# Patient Record
Sex: MALE | Race: WHITE | NOT HISPANIC OR LATINO
[De-identification: names, ages, dates, MRNs, and addresses within clinical notes are randomized per-mention and may not be internally consistent; named-entity substitution may affect disease eponyms.]

---

## 2022-05-28 ENCOUNTER — TRANSCRIPTION ENCOUNTER (OUTPATIENT)
Age: 57
End: 2022-05-28

## 2022-05-29 ENCOUNTER — INPATIENT (INPATIENT)
Facility: HOSPITAL | Age: 57
LOS: 9 days | Discharge: NURSING, REHAB OR LONG-TERM CARE | DRG: 493 | End: 2022-06-08
Attending: SPECIALIST | Admitting: SPECIALIST
Payer: COMMERCIAL

## 2022-05-29 ENCOUNTER — TRANSCRIPTION ENCOUNTER (OUTPATIENT)
Age: 57
End: 2022-05-29

## 2022-05-29 VITALS
DIASTOLIC BLOOD PRESSURE: 92 MMHG | SYSTOLIC BLOOD PRESSURE: 155 MMHG | OXYGEN SATURATION: 97 % | RESPIRATION RATE: 24 BRPM | HEART RATE: 85 BPM

## 2022-05-29 DIAGNOSIS — T14.8XXA OTHER INJURY OF UNSPECIFIED BODY REGION, INITIAL ENCOUNTER: ICD-10-CM

## 2022-05-29 DIAGNOSIS — N13.5 CROSSING VESSEL AND STRICTURE OF URETER WITHOUT HYDRONEPHROSIS: Chronic | ICD-10-CM

## 2022-05-29 DIAGNOSIS — Z98.89 OTHER SPECIFIED POSTPROCEDURAL STATES: Chronic | ICD-10-CM

## 2022-05-29 DIAGNOSIS — H57.8 OTHER SPECIFIED DISORDERS OF EYE AND ADNEXA: Chronic | ICD-10-CM

## 2022-05-29 LAB
ALBUMIN SERPL ELPH-MCNC: 4.2 G/DL — SIGNIFICANT CHANGE UP (ref 3.3–5)
ALP SERPL-CCNC: 44 U/L — SIGNIFICANT CHANGE UP (ref 40–120)
ALT FLD-CCNC: 19 U/L — SIGNIFICANT CHANGE UP (ref 10–45)
ANION GAP SERPL CALC-SCNC: 17 MMOL/L — SIGNIFICANT CHANGE UP (ref 5–17)
APTT BLD: 21.4 SEC — LOW (ref 27.5–35.5)
AST SERPL-CCNC: 26 U/L — SIGNIFICANT CHANGE UP (ref 10–40)
BASE EXCESS BLDV CALC-SCNC: -2 MMOL/L — SIGNIFICANT CHANGE UP (ref -2–2)
BASOPHILS # BLD AUTO: 0.07 K/UL — SIGNIFICANT CHANGE UP (ref 0–0.2)
BASOPHILS NFR BLD AUTO: 0.5 % — SIGNIFICANT CHANGE UP (ref 0–2)
BILIRUB SERPL-MCNC: 0.5 MG/DL — SIGNIFICANT CHANGE UP (ref 0.2–1.2)
BLD GP AB SCN SERPL QL: NEGATIVE — SIGNIFICANT CHANGE UP
BUN SERPL-MCNC: 19 MG/DL — SIGNIFICANT CHANGE UP (ref 7–23)
CA-I SERPL-SCNC: 1.2 MMOL/L — SIGNIFICANT CHANGE UP (ref 1.15–1.33)
CALCIUM SERPL-MCNC: 8.9 MG/DL — SIGNIFICANT CHANGE UP (ref 8.4–10.5)
CHLORIDE BLDV-SCNC: 99 MMOL/L — SIGNIFICANT CHANGE UP (ref 96–108)
CHLORIDE SERPL-SCNC: 101 MMOL/L — SIGNIFICANT CHANGE UP (ref 96–108)
CO2 BLDV-SCNC: 24 MMOL/L — SIGNIFICANT CHANGE UP (ref 22–26)
CO2 SERPL-SCNC: 20 MMOL/L — LOW (ref 22–31)
CREAT SERPL-MCNC: 1.01 MG/DL — SIGNIFICANT CHANGE UP (ref 0.5–1.3)
EGFR: 86 ML/MIN/1.73M2 — SIGNIFICANT CHANGE UP
EOSINOPHIL # BLD AUTO: 0.15 K/UL — SIGNIFICANT CHANGE UP (ref 0–0.5)
EOSINOPHIL NFR BLD AUTO: 1.1 % — SIGNIFICANT CHANGE UP (ref 0–6)
GAS PNL BLDV: 132 MMOL/L — LOW (ref 136–145)
GAS PNL BLDV: SIGNIFICANT CHANGE UP
GAS PNL BLDV: SIGNIFICANT CHANGE UP
GLUCOSE BLDV-MCNC: 166 MG/DL — HIGH (ref 70–99)
GLUCOSE SERPL-MCNC: 164 MG/DL — HIGH (ref 70–99)
HCO3 BLDV-SCNC: 23 MMOL/L — SIGNIFICANT CHANGE UP (ref 22–29)
HCT VFR BLD CALC: 42.8 % — SIGNIFICANT CHANGE UP (ref 39–50)
HCT VFR BLDA CALC: 46 % — SIGNIFICANT CHANGE UP (ref 39–51)
HGB BLD CALC-MCNC: 15.2 G/DL — SIGNIFICANT CHANGE UP (ref 12.6–17.4)
HGB BLD-MCNC: 15 G/DL — SIGNIFICANT CHANGE UP (ref 13–17)
IMM GRANULOCYTES NFR BLD AUTO: 0.4 % — SIGNIFICANT CHANGE UP (ref 0–1.5)
INR BLD: 1.05 RATIO — SIGNIFICANT CHANGE UP (ref 0.88–1.16)
LACTATE BLDV-MCNC: 2.3 MMOL/L — HIGH (ref 0.7–2)
LIDOCAIN IGE QN: 48 U/L — SIGNIFICANT CHANGE UP (ref 7–60)
LYMPHOCYTES # BLD AUTO: 2.96 K/UL — SIGNIFICANT CHANGE UP (ref 1–3.3)
LYMPHOCYTES # BLD AUTO: 21.7 % — SIGNIFICANT CHANGE UP (ref 13–44)
MCHC RBC-ENTMCNC: 32.2 PG — SIGNIFICANT CHANGE UP (ref 27–34)
MCHC RBC-ENTMCNC: 35 GM/DL — SIGNIFICANT CHANGE UP (ref 32–36)
MCV RBC AUTO: 91.8 FL — SIGNIFICANT CHANGE UP (ref 80–100)
MONOCYTES # BLD AUTO: 0.86 K/UL — SIGNIFICANT CHANGE UP (ref 0–0.9)
MONOCYTES NFR BLD AUTO: 6.3 % — SIGNIFICANT CHANGE UP (ref 2–14)
NEUTROPHILS # BLD AUTO: 9.52 K/UL — HIGH (ref 1.8–7.4)
NEUTROPHILS NFR BLD AUTO: 70 % — SIGNIFICANT CHANGE UP (ref 43–77)
NRBC # BLD: 0 /100 WBCS — SIGNIFICANT CHANGE UP (ref 0–0)
PCO2 BLDV: 40 MMHG — LOW (ref 42–55)
PH BLDV: 7.37 — SIGNIFICANT CHANGE UP (ref 7.32–7.43)
PLATELET # BLD AUTO: 294 K/UL — SIGNIFICANT CHANGE UP (ref 150–400)
PO2 BLDV: 51 MMHG — HIGH (ref 25–45)
POTASSIUM BLDV-SCNC: 4.3 MMOL/L — SIGNIFICANT CHANGE UP (ref 3.5–5.1)
POTASSIUM SERPL-MCNC: 4.3 MMOL/L — SIGNIFICANT CHANGE UP (ref 3.5–5.3)
POTASSIUM SERPL-SCNC: 4.3 MMOL/L — SIGNIFICANT CHANGE UP (ref 3.5–5.3)
PROT SERPL-MCNC: 6.6 G/DL — SIGNIFICANT CHANGE UP (ref 6–8.3)
PROTHROM AB SERPL-ACNC: 12.2 SEC — SIGNIFICANT CHANGE UP (ref 10.5–13.4)
RBC # BLD: 4.66 M/UL — SIGNIFICANT CHANGE UP (ref 4.2–5.8)
RBC # FLD: 12.7 % — SIGNIFICANT CHANGE UP (ref 10.3–14.5)
RH IG SCN BLD-IMP: POSITIVE — SIGNIFICANT CHANGE UP
SAO2 % BLDV: 82.4 % — SIGNIFICANT CHANGE UP (ref 67–88)
SARS-COV-2 RNA SPEC QL NAA+PROBE: SIGNIFICANT CHANGE UP
SODIUM SERPL-SCNC: 138 MMOL/L — SIGNIFICANT CHANGE UP (ref 135–145)
WBC # BLD: 13.61 K/UL — HIGH (ref 3.8–10.5)
WBC # FLD AUTO: 13.61 K/UL — HIGH (ref 3.8–10.5)

## 2022-05-29 PROCEDURE — 73590 X-RAY EXAM OF LOWER LEG: CPT | Mod: 26,LT

## 2022-05-29 PROCEDURE — 99285 EMERGENCY DEPT VISIT HI MDM: CPT

## 2022-05-29 PROCEDURE — 73590 X-RAY EXAM OF LOWER LEG: CPT | Mod: 26,LT,77

## 2022-05-29 PROCEDURE — 11012 DEB SKIN BONE AT FX SITE: CPT | Mod: LT

## 2022-05-29 PROCEDURE — 73700 CT LOWER EXTREMITY W/O DYE: CPT | Mod: 26,RT

## 2022-05-29 PROCEDURE — 72170 X-RAY EXAM OF PELVIS: CPT | Mod: 26

## 2022-05-29 PROCEDURE — 76377 3D RENDER W/INTRP POSTPROCES: CPT | Mod: 26

## 2022-05-29 PROCEDURE — 20692 APPL MLTPLN UNI EXT FIXJ SYS: CPT | Mod: LT

## 2022-05-29 PROCEDURE — 73552 X-RAY EXAM OF FEMUR 2/>: CPT | Mod: 26,RT,76

## 2022-05-29 PROCEDURE — 12035 INTMD RPR S/A/T/EXT 12.6-20: CPT | Mod: 59,RT

## 2022-05-29 DEVICE — IMPLANTABLE DEVICE: Type: IMPLANTABLE DEVICE | Site: LEFT | Status: FUNCTIONAL

## 2022-05-29 RX ORDER — TETANUS TOXOID, REDUCED DIPHTHERIA TOXOID AND ACELLULAR PERTUSSIS VACCINE, ADSORBED 5; 2.5; 8; 8; 2.5 [IU]/.5ML; [IU]/.5ML; UG/.5ML; UG/.5ML; UG/.5ML
0.5 SUSPENSION INTRAMUSCULAR ONCE
Refills: 0 | Status: COMPLETED | OUTPATIENT
Start: 2022-05-29 | End: 2022-05-29

## 2022-05-29 RX ORDER — CEFAZOLIN SODIUM 1 G
2000 VIAL (EA) INJECTION EVERY 8 HOURS
Refills: 0 | Status: COMPLETED | OUTPATIENT
Start: 2022-05-29 | End: 2022-05-31

## 2022-05-29 RX ORDER — FENTANYL CITRATE 50 UG/ML
50 INJECTION INTRAVENOUS ONCE
Refills: 0 | Status: DISCONTINUED | OUTPATIENT
Start: 2022-05-29 | End: 2022-05-29

## 2022-05-29 RX ORDER — FINASTERIDE 5 MG/1
5 TABLET, FILM COATED ORAL DAILY
Refills: 0 | Status: DISCONTINUED | OUTPATIENT
Start: 2022-05-29 | End: 2022-06-04

## 2022-05-29 RX ORDER — HYDROMORPHONE HYDROCHLORIDE 2 MG/ML
0.5 INJECTION INTRAMUSCULAR; INTRAVENOUS; SUBCUTANEOUS
Refills: 0 | Status: DISCONTINUED | OUTPATIENT
Start: 2022-05-29 | End: 2022-05-29

## 2022-05-29 RX ORDER — POLYETHYLENE GLYCOL 3350 17 G/17G
17 POWDER, FOR SOLUTION ORAL DAILY
Refills: 0 | Status: DISCONTINUED | OUTPATIENT
Start: 2022-05-29 | End: 2022-06-04

## 2022-05-29 RX ORDER — CEFAZOLIN SODIUM 1 G
2000 VIAL (EA) INJECTION ONCE
Refills: 0 | Status: COMPLETED | OUTPATIENT
Start: 2022-05-29 | End: 2022-05-29

## 2022-05-29 RX ORDER — ONDANSETRON 8 MG/1
4 TABLET, FILM COATED ORAL ONCE
Refills: 0 | Status: DISCONTINUED | OUTPATIENT
Start: 2022-05-29 | End: 2022-05-29

## 2022-05-29 RX ORDER — SODIUM CHLORIDE 9 MG/ML
1000 INJECTION INTRAMUSCULAR; INTRAVENOUS; SUBCUTANEOUS
Refills: 0 | Status: COMPLETED | OUTPATIENT
Start: 2022-05-29 | End: 2022-05-30

## 2022-05-29 RX ORDER — MORPHINE SULFATE 50 MG/1
2 CAPSULE, EXTENDED RELEASE ORAL EVERY 4 HOURS
Refills: 0 | Status: DISCONTINUED | OUTPATIENT
Start: 2022-05-29 | End: 2022-05-31

## 2022-05-29 RX ORDER — FERROUS SULFATE 325(65) MG
325 TABLET ORAL ONCE
Refills: 0 | Status: DISCONTINUED | OUTPATIENT
Start: 2022-05-29 | End: 2022-06-08

## 2022-05-29 RX ORDER — OXYCODONE HYDROCHLORIDE 5 MG/1
5 TABLET ORAL EVERY 4 HOURS
Refills: 0 | Status: DISCONTINUED | OUTPATIENT
Start: 2022-05-29 | End: 2022-06-04

## 2022-05-29 RX ORDER — ACETAMINOPHEN 500 MG
975 TABLET ORAL EVERY 8 HOURS
Refills: 0 | Status: DISCONTINUED | OUTPATIENT
Start: 2022-05-29 | End: 2022-06-04

## 2022-05-29 RX ORDER — ONDANSETRON 8 MG/1
4 TABLET, FILM COATED ORAL EVERY 6 HOURS
Refills: 0 | Status: DISCONTINUED | OUTPATIENT
Start: 2022-05-29 | End: 2022-06-04

## 2022-05-29 RX ORDER — SODIUM CHLORIDE 9 MG/ML
250 INJECTION INTRAMUSCULAR; INTRAVENOUS; SUBCUTANEOUS ONCE
Refills: 0 | Status: COMPLETED | OUTPATIENT
Start: 2022-05-29 | End: 2022-05-29

## 2022-05-29 RX ORDER — LANOLIN ALCOHOL/MO/W.PET/CERES
3 CREAM (GRAM) TOPICAL AT BEDTIME
Refills: 0 | Status: DISCONTINUED | OUTPATIENT
Start: 2022-05-29 | End: 2022-06-04

## 2022-05-29 RX ORDER — HEPARIN SODIUM 5000 [USP'U]/ML
5000 INJECTION INTRAVENOUS; SUBCUTANEOUS EVERY 8 HOURS
Refills: 0 | Status: COMPLETED | OUTPATIENT
Start: 2022-05-29 | End: 2022-05-29

## 2022-05-29 RX ORDER — OXYCODONE HYDROCHLORIDE 5 MG/1
10 TABLET ORAL EVERY 4 HOURS
Refills: 0 | Status: DISCONTINUED | OUTPATIENT
Start: 2022-05-29 | End: 2022-06-04

## 2022-05-29 RX ORDER — SENNA PLUS 8.6 MG/1
2 TABLET ORAL AT BEDTIME
Refills: 0 | Status: DISCONTINUED | OUTPATIENT
Start: 2022-05-29 | End: 2022-06-04

## 2022-05-29 RX ORDER — MAGNESIUM HYDROXIDE 400 MG/1
30 TABLET, CHEWABLE ORAL DAILY
Refills: 0 | Status: DISCONTINUED | OUTPATIENT
Start: 2022-05-29 | End: 2022-06-04

## 2022-05-29 RX ADMIN — FENTANYL CITRATE 50 MICROGRAM(S): 50 INJECTION INTRAVENOUS at 11:57

## 2022-05-29 RX ADMIN — HEPARIN SODIUM 5000 UNIT(S): 5000 INJECTION INTRAVENOUS; SUBCUTANEOUS at 21:22

## 2022-05-29 RX ADMIN — Medication 975 MILLIGRAM(S): at 22:15

## 2022-05-29 RX ADMIN — SODIUM CHLORIDE 100 MILLILITER(S): 9 INJECTION INTRAMUSCULAR; INTRAVENOUS; SUBCUTANEOUS at 15:04

## 2022-05-29 RX ADMIN — Medication 100 MILLIGRAM(S): at 20:57

## 2022-05-29 RX ADMIN — FENTANYL CITRATE 50 MICROGRAM(S): 50 INJECTION INTRAVENOUS at 12:15

## 2022-05-29 RX ADMIN — FENTANYL CITRATE 50 MICROGRAM(S): 50 INJECTION INTRAVENOUS at 11:38

## 2022-05-29 RX ADMIN — Medication 2000 MILLIGRAM(S): at 11:28

## 2022-05-29 RX ADMIN — SODIUM CHLORIDE 250 MILLILITER(S): 9 INJECTION INTRAMUSCULAR; INTRAVENOUS; SUBCUTANEOUS at 11:09

## 2022-05-29 RX ADMIN — SENNA PLUS 2 TABLET(S): 8.6 TABLET ORAL at 21:22

## 2022-05-29 RX ADMIN — OXYCODONE HYDROCHLORIDE 5 MILLIGRAM(S): 5 TABLET ORAL at 21:55

## 2022-05-29 RX ADMIN — TETANUS TOXOID, REDUCED DIPHTHERIA TOXOID AND ACELLULAR PERTUSSIS VACCINE, ADSORBED 0.5 MILLILITER(S): 5; 2.5; 8; 8; 2.5 SUSPENSION INTRAMUSCULAR at 11:39

## 2022-05-29 RX ADMIN — OXYCODONE HYDROCHLORIDE 5 MILLIGRAM(S): 5 TABLET ORAL at 21:22

## 2022-05-29 RX ADMIN — SODIUM CHLORIDE 250 MILLILITER(S): 9 INJECTION INTRAMUSCULAR; INTRAVENOUS; SUBCUTANEOUS at 11:28

## 2022-05-29 RX ADMIN — Medication 975 MILLIGRAM(S): at 21:22

## 2022-05-29 RX ADMIN — FENTANYL CITRATE 50 MICROGRAM(S): 50 INJECTION INTRAVENOUS at 11:06

## 2022-05-29 RX ADMIN — Medication 100 MILLIGRAM(S): at 11:10

## 2022-05-29 RX ADMIN — FENTANYL CITRATE 50 MICROGRAM(S): 50 INJECTION INTRAVENOUS at 11:11
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
## 2022-05-29 NOTE — ED PROVIDER NOTE - NSICDXPASTSURGICALHX_GEN_ALL_CORE_FT
PAST SURGICAL HISTORY:  Cyst of eye Excision    S/P laparoscopic procedure s/p pyeloplasty- left, suprapubic catheter insertion & left stent insertion march 2014    UPJ (ureteropelvic junction) obstruction s/p nephrostomy, feb 2014

## 2022-05-29 NOTE — H&P ADULT - NSHPPHYSICALEXAM_GEN_ALL_CORE
PE   Left LE:  wound over the anterior proximal tibia with associated soft tissue swelling. g  Compartments soft; + TTP about tibia. No TTP to hip/ankle/foot   ROM limited 2/2 pain   Motor intact GS/TA/FHL/EHL  SILT L2-S1  DP/PT pulses 2+  Well perfused    Right LE:   small wound over the anterior proximal tibia with associated soft tissue swelling.  Compartments soft; minimal TTP about tibia. No TTP to hip/ankle/foot   PROM of knee with mild pain, stable knee  Motor intact GS/TA/FHL/EHL  SILT L2-S1  DP/PT pulses 2+  Well perfused    Imaging:  XR demonstrating displaced left proximal 1/3 tibia fracture.  Remainder of imaging is pending at this time PE   Gen: Mild distress from pain, alert and oriented  Resp: No distress, breathing well on RA  Left LE:  wound over the anterior proximal tibia with associated soft tissue swelling  Compartments soft; + TTP about tibia. No TTP to hip/ankle/foot   ROM limited 2/2 pain   Motor intact GS/TA/FHL/EHL  SILT L2-S1  DP/PT pulses 2+  Well perfused    Right LE:   small 3 cm wound over the anterior proximal tibia with associated soft tissue swelling and appearance of some gross contamination near wound  Compartments soft; minimal TTP about tibia. No TTP to hip/ankle/foot   PROM of knee with mild pain, stable knee  Motor intact GS/TA/FHL/EHL  SILT L2-S1  DP/PT pulses 2+  Well perfused    Secondary: No bony tenderness or pain with ROM in BL LE. No bony tenderness of clavicle or chest wall. Pelvis nontender and stable. No midline tenderness in cervical, thoracic, or lumbar spine.
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
## 2022-05-29 NOTE — ED ADULT TRIAGE NOTE - SOURCE OF INFORMATION
Patient initially escalated due to no response, however patient entered vitals prior to phone call. Vital signs and symptoms did not trigger a second escalation; therefore, no follow up call is needed at this time.    Reason for Disposition   Health Information question, no triage required and triager able to answer question    Additional Information   Negative: [1] Caller is not with the adult (patient) AND [2] reporting urgent symptoms   Negative: Lab result questions   Negative: Medication questions   Negative: Caller can't be reached by phone   Negative: Caller has already spoken to PCP or another triager   Negative: RN needs further essential information from caller in order to complete triage   Negative: Requesting regular office appointment   Negative: [1] Caller requesting NON-URGENT health information AND [2] PCP's office is the best resource    Protocols used: INFORMATION ONLY CALL - NO TRIAGE-A-       EMS
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
## 2022-05-29 NOTE — ED PROVIDER NOTE - NSICDXPASTMEDICALHX_GEN_ALL_CORE_FT
PAST MEDICAL HISTORY:  BPH (benign prostatic hyperplasia)     UPJ (ureteropelvic junction) obstruction

## 2022-05-29 NOTE — ED PROVIDER NOTE - PROGRESS NOTE DETAILS
Abbey Currie MD Attending Physician- spoke with xray. prelim xrays done at bedside but I cannot see in PACS. xray tech will attempt to send to PACS to view. ortho wanted CT then patient to go to xray Abbey Currie MD Attending Physician- spoke with xray. prelim xrays done at bedside (viewed by ortho) but I cannot see in PACS. xray tech will attempt to send to PACS to view. ortho wanted CT then patient to go to xray Abbey Currie MD Attending Physician- ortho to take to OR

## 2022-05-29 NOTE — H&P ADULT - NSHPLABSRESULTS_GEN_ALL_CORE
Imaging:  XR demonstrating displaced left proximal 1/3 tibia fracture.    CT re-demonstrates fracture of L proximal tibia and fibula. Shows no intra-articular air in bilateral knees

## 2022-05-29 NOTE — ED PROVIDER NOTE - PHYSICAL EXAMINATION
General: Well appearing, awake, alert, oriented. Resting in bed.  HEENT: PERRLA EOMI. No trauma/bruising noted to head or face. No tenderness to palpation to bony structures of face. No facial bones step-off palpated. No lozada sign, no raccoon eyes noted. No hemotympanum noted.   CV: Regular rate and rhythm, S1/S2, no murmurs/rubs/gallops noted on exam. No tenderness to palpation to chest wall. No crepitus, no bony step-off noted on exam of chest wall.   Lungs: Clear to ascultation bilaterally, no wheezes/crackles/rales noted on exam. Equal chest wall excursion noted.   Abdomen: Soft, non tender, non distended, no guarding or rebound. No CVA tenderness to palpation.   MSK: Intact ROM of upper and lower extremities bilaterally. No tenderness to palpation to extremities. Intact ROM of neck. No gross deformities noted to extremities. No C-spine or spinal bony tenderness to palpation. Pelvis stable. Neck supple.   Neuro: Awake, A+O x4, moving all extremities spontaneously. CN 2-12 grossly intact. No nystagmus noted. Strength and sensation grossly intact to all extremities. Ambulatory w/o assist, normal gait. Speech fluent, no slurred speech. Negative romberg. No pronator drift. Finger to nose normal. Rectal tone grossly intact.   Extremities: No swelling or edema noted to extremities. No calf tenderness to palpation.   Skin: No rash or bruising noted on exam. General: Well appearing, awake, alert, oriented. Resting in bed.  HEENT: PERRLA EOMI. No trauma/bruising noted to head or face. No tenderness to palpation to bony structures of face. No facial bones step-off palpated. No lozada sign, no raccoon eyes noted. No hemotympanum noted.   CV: Regular rate and rhythm, S1/S2, no murmurs/rubs/gallops noted on exam. No tenderness to palpation to chest wall. No crepitus, no bony step-off noted on exam of chest wall.   Lungs: Clear to ascultation bilaterally, no wheezes/crackles/rales noted on exam. Equal chest wall excursion noted.   Abdomen: Soft, non tender, non distended, no guarding or rebound. No CVA tenderness to palpation.   MSK: Intact ROM of upper and lower extremities bilaterally.. Intact ROM of neck.. No C-spine or spinal bony tenderness to palpation. Pelvis stable. Neck supple.   Neuro: Awake, A+O x4, moving all extremities spontaneously. CN 2-12 grossly intact. No nystagmus noted. Strength and sensation grossly intact to all extremities. Ambulatory w/o assist, normal gait. Speech fluent, no slurred speech. Negative romberg. No pronator drift. Finger to nose normal. Rectal tone grossly intact.   Skin: No rash or bruising noted on exam. General: Well appearing, awake, alert, oriented. Resting in bed.  HEENT: PERRLA EOMI. No trauma/bruising noted to head or face. No tenderness to palpation to bony structures of face. No facial bones step-off palpated. No lozada sign, no raccoon eyes noted. No hemotympanum noted.   CV: Regular rate and rhythm, S1/S2, no murmurs/rubs/gallops noted on exam. No tenderness to palpation to chest wall. No crepitus, no bony step-off noted on exam of chest wall.   Lungs: Clear to ascultation bilaterally, no wheezes/crackles/rales noted on exam. Equal chest wall excursion noted.   Abdomen: Soft, non tender, non distended, no guarding or rebound. No CVA tenderness to palpation.   MSK: open fx b/l over tibial plateau, abrasions to R knee, pulses intact b/l at level of fem/pop/DP/PT  Neuro: Awake, A+O x4, moving all extremities spontaneously. CN 2-12 grossly intact. No nystagmus noted. Strength and sensation grossly intact to all extremities. Ambulatory w/o assist, normal gait. Speech fluent, no slurred speech. Negative romberg. No pronator drift. Finger to nose normal. Rectal tone grossly intact.

## 2022-05-29 NOTE — ED PROVIDER NOTE - NS ED ROS FT
CONST: no fevers, no chills, no lightheadedness  HEENT: no vision change, no sore throat  CV: no chest pain, no palpitations  RESP: no cough, no shortness of breath  ABD: no abdominal pain, no nausea/vomiting, no diarrhea  : no dysuria, no hematuria  ENDO: no frequent urination, no unusual thirst  MSK: + b/l LE fractures, pain, no neck pain   NEURO: no headache, no focal weakness, no loss of sensation  SKIN:  no rash

## 2022-05-29 NOTE — ED PROVIDER NOTE - CHIEF COMPLAINT
LAST REFILL CALL OFFICE TO SCHEDULE A FGASTING APPOINTMENT WITH  THIS MONTH  
The patient is a 57y Male complaining of

## 2022-05-30 DIAGNOSIS — R52 PAIN, UNSPECIFIED: ICD-10-CM

## 2022-05-30 DIAGNOSIS — N40.0 BENIGN PROSTATIC HYPERPLASIA WITHOUT LOWER URINARY TRACT SYMPTOMS: ICD-10-CM

## 2022-05-30 DIAGNOSIS — S82.202A UNSPECIFIED FRACTURE OF SHAFT OF LEFT TIBIA, INITIAL ENCOUNTER FOR CLOSED FRACTURE: ICD-10-CM

## 2022-05-30 LAB
ANION GAP SERPL CALC-SCNC: 8 MMOL/L — SIGNIFICANT CHANGE UP (ref 5–17)
APTT BLD: 26.2 SEC — LOW (ref 27.5–35.5)
BUN SERPL-MCNC: 18 MG/DL — SIGNIFICANT CHANGE UP (ref 7–23)
CALCIUM SERPL-MCNC: 8.3 MG/DL — LOW (ref 8.4–10.5)
CHLORIDE SERPL-SCNC: 105 MMOL/L — SIGNIFICANT CHANGE UP (ref 96–108)
CO2 SERPL-SCNC: 24 MMOL/L — SIGNIFICANT CHANGE UP (ref 22–31)
CREAT SERPL-MCNC: 0.94 MG/DL — SIGNIFICANT CHANGE UP (ref 0.5–1.3)
EGFR: 94 ML/MIN/1.73M2 — SIGNIFICANT CHANGE UP
GLUCOSE SERPL-MCNC: 122 MG/DL — HIGH (ref 70–99)
HCT VFR BLD CALC: 33.4 % — LOW (ref 39–50)
HGB BLD-MCNC: 11.4 G/DL — LOW (ref 13–17)
INR BLD: 1.11 RATIO — SIGNIFICANT CHANGE UP (ref 0.88–1.16)
MCHC RBC-ENTMCNC: 31.7 PG — SIGNIFICANT CHANGE UP (ref 27–34)
MCHC RBC-ENTMCNC: 34.1 GM/DL — SIGNIFICANT CHANGE UP (ref 32–36)
MCV RBC AUTO: 92.8 FL — SIGNIFICANT CHANGE UP (ref 80–100)
NRBC # BLD: 0 /100 WBCS — SIGNIFICANT CHANGE UP (ref 0–0)
PLATELET # BLD AUTO: 242 K/UL — SIGNIFICANT CHANGE UP (ref 150–400)
POTASSIUM SERPL-MCNC: 4.2 MMOL/L — SIGNIFICANT CHANGE UP (ref 3.5–5.3)
POTASSIUM SERPL-SCNC: 4.2 MMOL/L — SIGNIFICANT CHANGE UP (ref 3.5–5.3)
PROTHROM AB SERPL-ACNC: 12.9 SEC — SIGNIFICANT CHANGE UP (ref 10.5–13.4)
RBC # BLD: 3.6 M/UL — LOW (ref 4.2–5.8)
RBC # FLD: 13 % — SIGNIFICANT CHANGE UP (ref 10.3–14.5)
SODIUM SERPL-SCNC: 137 MMOL/L — SIGNIFICANT CHANGE UP (ref 135–145)
WBC # BLD: 8.34 K/UL — SIGNIFICANT CHANGE UP (ref 3.8–10.5)
WBC # FLD AUTO: 8.34 K/UL — SIGNIFICANT CHANGE UP (ref 3.8–10.5)

## 2022-05-30 PROCEDURE — 99232 SBSQ HOSP IP/OBS MODERATE 35: CPT

## 2022-05-30 PROCEDURE — 71045 X-RAY EXAM CHEST 1 VIEW: CPT | Mod: 26

## 2022-05-30 PROCEDURE — 93010 ELECTROCARDIOGRAM REPORT: CPT

## 2022-05-30 RX ORDER — ENOXAPARIN SODIUM 100 MG/ML
40 INJECTION SUBCUTANEOUS EVERY 24 HOURS
Refills: 0 | Status: DISCONTINUED | OUTPATIENT
Start: 2022-05-30 | End: 2022-06-03

## 2022-05-30 RX ORDER — SODIUM CHLORIDE 9 MG/ML
500 INJECTION, SOLUTION INTRAVENOUS ONCE
Refills: 0 | Status: COMPLETED | OUTPATIENT
Start: 2022-05-30 | End: 2022-05-30

## 2022-05-30 RX ADMIN — Medication 100 MILLIGRAM(S): at 21:45

## 2022-05-30 RX ADMIN — Medication 100 MILLIGRAM(S): at 04:38

## 2022-05-30 RX ADMIN — OXYCODONE HYDROCHLORIDE 10 MILLIGRAM(S): 5 TABLET ORAL at 21:33

## 2022-05-30 RX ADMIN — Medication 975 MILLIGRAM(S): at 21:33

## 2022-05-30 RX ADMIN — OXYCODONE HYDROCHLORIDE 10 MILLIGRAM(S): 5 TABLET ORAL at 13:25

## 2022-05-30 RX ADMIN — OXYCODONE HYDROCHLORIDE 10 MILLIGRAM(S): 5 TABLET ORAL at 01:45

## 2022-05-30 RX ADMIN — ENOXAPARIN SODIUM 40 MILLIGRAM(S): 100 INJECTION SUBCUTANEOUS at 15:53

## 2022-05-30 RX ADMIN — Medication 100 MILLIGRAM(S): at 11:08

## 2022-05-30 RX ADMIN — OXYCODONE HYDROCHLORIDE 10 MILLIGRAM(S): 5 TABLET ORAL at 22:05

## 2022-05-30 RX ADMIN — OXYCODONE HYDROCHLORIDE 10 MILLIGRAM(S): 5 TABLET ORAL at 08:11

## 2022-05-30 RX ADMIN — OXYCODONE HYDROCHLORIDE 10 MILLIGRAM(S): 5 TABLET ORAL at 01:14

## 2022-05-30 RX ADMIN — Medication 975 MILLIGRAM(S): at 13:54

## 2022-05-30 RX ADMIN — OXYCODONE HYDROCHLORIDE 10 MILLIGRAM(S): 5 TABLET ORAL at 18:16

## 2022-05-30 RX ADMIN — Medication 975 MILLIGRAM(S): at 22:05

## 2022-05-30 RX ADMIN — Medication 975 MILLIGRAM(S): at 05:35

## 2022-05-30 RX ADMIN — SODIUM CHLORIDE 100 MILLILITER(S): 9 INJECTION INTRAMUSCULAR; INTRAVENOUS; SUBCUTANEOUS at 04:37

## 2022-05-30 RX ADMIN — OXYCODONE HYDROCHLORIDE 10 MILLIGRAM(S): 5 TABLET ORAL at 12:27

## 2022-05-30 RX ADMIN — OXYCODONE HYDROCHLORIDE 10 MILLIGRAM(S): 5 TABLET ORAL at 17:25

## 2022-05-30 RX ADMIN — SODIUM CHLORIDE 500 MILLILITER(S): 9 INJECTION, SOLUTION INTRAVENOUS at 05:34

## 2022-05-30 RX ADMIN — FINASTERIDE 5 MILLIGRAM(S): 5 TABLET, FILM COATED ORAL at 11:08

## 2022-05-30 RX ADMIN — OXYCODONE HYDROCHLORIDE 10 MILLIGRAM(S): 5 TABLET ORAL at 08:56

## 2022-05-30 RX ADMIN — Medication 975 MILLIGRAM(S): at 14:46

## 2022-05-30 RX ADMIN — Medication 975 MILLIGRAM(S): at 06:30
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
## 2022-05-30 NOTE — PHYSICAL THERAPY INITIAL EVALUATION ADULT - ACTIVE RANGE OF MOTION EXAMINATION, REHAB EVAL
left LE in exfix/bilateral upper extremity Active ROM was WFL (within functional limits)/bilateral  lower extremity Active ROM was WFL (within functional limits)

## 2022-05-30 NOTE — CHART NOTE - NSCHARTNOTEFT_GEN_A_CORE
Case discussed with Dr. Low. Plan is to pursue definitive management with removal of ex fix and ORIF 6/4/22. Diet order resumed.
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
## 2022-05-30 NOTE — OCCUPATIONAL THERAPY INITIAL EVALUATION ADULT - TRANSFER TRAINING, PT EVAL
Non family member GOAL: Patient will be independent with functional transfers with use of rolling walker  in 4 weeks

## 2022-05-30 NOTE — CONSULT NOTE ADULT - PROBLEM SELECTOR RECOMMENDATION 9
Patient currently in exfix  scheduled for further surgery 6/2  PO as tolerated  continue wound care  DVT and GI prophylaxis
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
## 2022-05-30 NOTE — OCCUPATIONAL THERAPY INITIAL EVALUATION ADULT - DIAGNOSIS, OT EVAL
Pt presents with pain, decreased ROM, strength, endurance, balance, and coordination, all impacting ability to perform ADLs and functional mobility.

## 2022-05-30 NOTE — OCCUPATIONAL THERAPY INITIAL EVALUATION ADULT - RANGE OF MOTION EXAMINATION, LOWER EXTREMITY
except L ankle/knee- not tested in ex-fix/bilateral LE Active ROM was WFL  (within functional limits)
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
## 2022-05-31 LAB
ANION GAP SERPL CALC-SCNC: 11 MMOL/L — SIGNIFICANT CHANGE UP (ref 5–17)
BUN SERPL-MCNC: 16 MG/DL — SIGNIFICANT CHANGE UP (ref 7–23)
CALCIUM SERPL-MCNC: 8.5 MG/DL — SIGNIFICANT CHANGE UP (ref 8.4–10.5)
CHLORIDE SERPL-SCNC: 105 MMOL/L — SIGNIFICANT CHANGE UP (ref 96–108)
CO2 SERPL-SCNC: 24 MMOL/L — SIGNIFICANT CHANGE UP (ref 22–31)
CREAT SERPL-MCNC: 0.98 MG/DL — SIGNIFICANT CHANGE UP (ref 0.5–1.3)
EGFR: 89 ML/MIN/1.73M2 — SIGNIFICANT CHANGE UP
GLUCOSE SERPL-MCNC: 93 MG/DL — SIGNIFICANT CHANGE UP (ref 70–99)
HCT VFR BLD CALC: 32.4 % — LOW (ref 39–50)
HGB BLD-MCNC: 10.9 G/DL — LOW (ref 13–17)
MCHC RBC-ENTMCNC: 32.1 PG — SIGNIFICANT CHANGE UP (ref 27–34)
MCHC RBC-ENTMCNC: 33.6 GM/DL — SIGNIFICANT CHANGE UP (ref 32–36)
MCV RBC AUTO: 95.3 FL — SIGNIFICANT CHANGE UP (ref 80–100)
NRBC # BLD: 0 /100 WBCS — SIGNIFICANT CHANGE UP (ref 0–0)
PLATELET # BLD AUTO: 234 K/UL — SIGNIFICANT CHANGE UP (ref 150–400)
POTASSIUM SERPL-MCNC: 4.1 MMOL/L — SIGNIFICANT CHANGE UP (ref 3.5–5.3)
POTASSIUM SERPL-SCNC: 4.1 MMOL/L — SIGNIFICANT CHANGE UP (ref 3.5–5.3)
RBC # BLD: 3.4 M/UL — LOW (ref 4.2–5.8)
RBC # FLD: 13.3 % — SIGNIFICANT CHANGE UP (ref 10.3–14.5)
SODIUM SERPL-SCNC: 140 MMOL/L — SIGNIFICANT CHANGE UP (ref 135–145)
WBC # BLD: 7.64 K/UL — SIGNIFICANT CHANGE UP (ref 3.8–10.5)
WBC # FLD AUTO: 7.64 K/UL — SIGNIFICANT CHANGE UP (ref 3.8–10.5)

## 2022-05-31 RX ORDER — DIAZEPAM 5 MG
5 TABLET ORAL ONCE
Refills: 0 | Status: DISCONTINUED | OUTPATIENT
Start: 2022-05-31 | End: 2022-05-31

## 2022-05-31 RX ORDER — ZOLPIDEM TARTRATE 10 MG/1
5 TABLET ORAL ONCE
Refills: 0 | Status: DISCONTINUED | OUTPATIENT
Start: 2022-05-31 | End: 2022-05-31

## 2022-05-31 RX ORDER — MORPHINE SULFATE 50 MG/1
2 CAPSULE, EXTENDED RELEASE ORAL EVERY 4 HOURS
Refills: 0 | Status: DISCONTINUED | OUTPATIENT
Start: 2022-05-31 | End: 2022-06-04

## 2022-05-31 RX ADMIN — MORPHINE SULFATE 2 MILLIGRAM(S): 50 CAPSULE, EXTENDED RELEASE ORAL at 11:50

## 2022-05-31 RX ADMIN — OXYCODONE HYDROCHLORIDE 10 MILLIGRAM(S): 5 TABLET ORAL at 17:59

## 2022-05-31 RX ADMIN — FINASTERIDE 5 MILLIGRAM(S): 5 TABLET, FILM COATED ORAL at 14:27

## 2022-05-31 RX ADMIN — MORPHINE SULFATE 2 MILLIGRAM(S): 50 CAPSULE, EXTENDED RELEASE ORAL at 00:55

## 2022-05-31 RX ADMIN — ENOXAPARIN SODIUM 40 MILLIGRAM(S): 100 INJECTION SUBCUTANEOUS at 18:00

## 2022-05-31 RX ADMIN — Medication 5 MILLIGRAM(S): at 12:07

## 2022-05-31 RX ADMIN — Medication 975 MILLIGRAM(S): at 14:25

## 2022-05-31 RX ADMIN — OXYCODONE HYDROCHLORIDE 10 MILLIGRAM(S): 5 TABLET ORAL at 14:25

## 2022-05-31 RX ADMIN — OXYCODONE HYDROCHLORIDE 10 MILLIGRAM(S): 5 TABLET ORAL at 18:55

## 2022-05-31 RX ADMIN — OXYCODONE HYDROCHLORIDE 10 MILLIGRAM(S): 5 TABLET ORAL at 13:26

## 2022-05-31 RX ADMIN — Medication 975 MILLIGRAM(S): at 22:03

## 2022-05-31 RX ADMIN — OXYCODONE HYDROCHLORIDE 10 MILLIGRAM(S): 5 TABLET ORAL at 05:35

## 2022-05-31 RX ADMIN — Medication 975 MILLIGRAM(S): at 13:26

## 2022-05-31 RX ADMIN — POLYETHYLENE GLYCOL 3350 17 GRAM(S): 17 POWDER, FOR SOLUTION ORAL at 14:26

## 2022-05-31 RX ADMIN — Medication 100 MILLIGRAM(S): at 05:36

## 2022-05-31 RX ADMIN — Medication 975 MILLIGRAM(S): at 06:05

## 2022-05-31 RX ADMIN — OXYCODONE HYDROCHLORIDE 10 MILLIGRAM(S): 5 TABLET ORAL at 22:03

## 2022-05-31 RX ADMIN — Medication 975 MILLIGRAM(S): at 22:30

## 2022-05-31 RX ADMIN — MORPHINE SULFATE 2 MILLIGRAM(S): 50 CAPSULE, EXTENDED RELEASE ORAL at 00:40

## 2022-05-31 RX ADMIN — MORPHINE SULFATE 2 MILLIGRAM(S): 50 CAPSULE, EXTENDED RELEASE ORAL at 12:20

## 2022-05-31 RX ADMIN — Medication 975 MILLIGRAM(S): at 05:36

## 2022-05-31 RX ADMIN — OXYCODONE HYDROCHLORIDE 10 MILLIGRAM(S): 5 TABLET ORAL at 22:30

## 2022-05-31 RX ADMIN — OXYCODONE HYDROCHLORIDE 10 MILLIGRAM(S): 5 TABLET ORAL at 06:05

## 2022-06-01 RX ADMIN — OXYCODONE HYDROCHLORIDE 10 MILLIGRAM(S): 5 TABLET ORAL at 13:59

## 2022-06-01 RX ADMIN — Medication 975 MILLIGRAM(S): at 14:30

## 2022-06-01 RX ADMIN — Medication 975 MILLIGRAM(S): at 06:02

## 2022-06-01 RX ADMIN — Medication 975 MILLIGRAM(S): at 13:23

## 2022-06-01 RX ADMIN — Medication 975 MILLIGRAM(S): at 06:00

## 2022-06-01 RX ADMIN — OXYCODONE HYDROCHLORIDE 10 MILLIGRAM(S): 5 TABLET ORAL at 22:45

## 2022-06-01 RX ADMIN — Medication 975 MILLIGRAM(S): at 21:45

## 2022-06-01 RX ADMIN — Medication 975 MILLIGRAM(S): at 22:45

## 2022-06-01 RX ADMIN — ENOXAPARIN SODIUM 40 MILLIGRAM(S): 100 INJECTION SUBCUTANEOUS at 15:04

## 2022-06-01 RX ADMIN — OXYCODONE HYDROCHLORIDE 10 MILLIGRAM(S): 5 TABLET ORAL at 12:49

## 2022-06-01 RX ADMIN — OXYCODONE HYDROCHLORIDE 10 MILLIGRAM(S): 5 TABLET ORAL at 21:45

## 2022-06-02 RX ADMIN — Medication 975 MILLIGRAM(S): at 04:16

## 2022-06-02 RX ADMIN — OXYCODONE HYDROCHLORIDE 10 MILLIGRAM(S): 5 TABLET ORAL at 21:55

## 2022-06-02 RX ADMIN — OXYCODONE HYDROCHLORIDE 10 MILLIGRAM(S): 5 TABLET ORAL at 14:15

## 2022-06-02 RX ADMIN — Medication 975 MILLIGRAM(S): at 14:08

## 2022-06-02 RX ADMIN — SENNA PLUS 2 TABLET(S): 8.6 TABLET ORAL at 21:57

## 2022-06-02 RX ADMIN — OXYCODONE HYDROCHLORIDE 10 MILLIGRAM(S): 5 TABLET ORAL at 05:17

## 2022-06-02 RX ADMIN — Medication 975 MILLIGRAM(S): at 21:55

## 2022-06-02 RX ADMIN — FINASTERIDE 5 MILLIGRAM(S): 5 TABLET, FILM COATED ORAL at 13:35

## 2022-06-02 RX ADMIN — Medication 975 MILLIGRAM(S): at 22:30

## 2022-06-02 RX ADMIN — Medication 975 MILLIGRAM(S): at 05:17

## 2022-06-02 RX ADMIN — OXYCODONE HYDROCHLORIDE 10 MILLIGRAM(S): 5 TABLET ORAL at 09:42

## 2022-06-02 RX ADMIN — POLYETHYLENE GLYCOL 3350 17 GRAM(S): 17 POWDER, FOR SOLUTION ORAL at 13:35

## 2022-06-02 RX ADMIN — OXYCODONE HYDROCHLORIDE 10 MILLIGRAM(S): 5 TABLET ORAL at 09:13

## 2022-06-02 RX ADMIN — OXYCODONE HYDROCHLORIDE 10 MILLIGRAM(S): 5 TABLET ORAL at 22:30

## 2022-06-02 RX ADMIN — ENOXAPARIN SODIUM 40 MILLIGRAM(S): 100 INJECTION SUBCUTANEOUS at 17:14

## 2022-06-02 RX ADMIN — OXYCODONE HYDROCHLORIDE 10 MILLIGRAM(S): 5 TABLET ORAL at 13:34

## 2022-06-02 RX ADMIN — OXYCODONE HYDROCHLORIDE 10 MILLIGRAM(S): 5 TABLET ORAL at 18:12

## 2022-06-02 RX ADMIN — OXYCODONE HYDROCHLORIDE 10 MILLIGRAM(S): 5 TABLET ORAL at 04:17

## 2022-06-02 RX ADMIN — OXYCODONE HYDROCHLORIDE 10 MILLIGRAM(S): 5 TABLET ORAL at 17:35

## 2022-06-02 NOTE — PROGRESS NOTE ADULT - NS ATTEND AMEND GEN_ALL_CORE FT
The edema is improving slowly.  The left ankle and foot dorsiflexion is significantly weaker than the right ankle and foot.

## 2022-06-03 ENCOUNTER — TRANSCRIPTION ENCOUNTER (OUTPATIENT)
Age: 57
End: 2022-06-03

## 2022-06-03 LAB — SARS-COV-2 RNA SPEC QL NAA+PROBE: SIGNIFICANT CHANGE UP

## 2022-06-03 RX ADMIN — ENOXAPARIN SODIUM 40 MILLIGRAM(S): 100 INJECTION SUBCUTANEOUS at 15:07

## 2022-06-03 RX ADMIN — SENNA PLUS 2 TABLET(S): 8.6 TABLET ORAL at 21:48

## 2022-06-03 RX ADMIN — Medication 975 MILLIGRAM(S): at 22:16

## 2022-06-03 RX ADMIN — Medication 975 MILLIGRAM(S): at 15:06

## 2022-06-03 RX ADMIN — Medication 975 MILLIGRAM(S): at 05:56

## 2022-06-03 RX ADMIN — Medication 975 MILLIGRAM(S): at 21:48

## 2022-06-03 RX ADMIN — OXYCODONE HYDROCHLORIDE 10 MILLIGRAM(S): 5 TABLET ORAL at 19:45

## 2022-06-03 RX ADMIN — OXYCODONE HYDROCHLORIDE 10 MILLIGRAM(S): 5 TABLET ORAL at 15:30

## 2022-06-03 RX ADMIN — OXYCODONE HYDROCHLORIDE 10 MILLIGRAM(S): 5 TABLET ORAL at 16:00

## 2022-06-03 RX ADMIN — Medication 975 MILLIGRAM(S): at 15:35

## 2022-06-03 RX ADMIN — Medication 975 MILLIGRAM(S): at 06:31

## 2022-06-03 RX ADMIN — OXYCODONE HYDROCHLORIDE 10 MILLIGRAM(S): 5 TABLET ORAL at 12:00

## 2022-06-03 RX ADMIN — FINASTERIDE 5 MILLIGRAM(S): 5 TABLET, FILM COATED ORAL at 11:31

## 2022-06-03 RX ADMIN — OXYCODONE HYDROCHLORIDE 10 MILLIGRAM(S): 5 TABLET ORAL at 20:52

## 2022-06-03 RX ADMIN — OXYCODONE HYDROCHLORIDE 10 MILLIGRAM(S): 5 TABLET ORAL at 04:45

## 2022-06-03 RX ADMIN — OXYCODONE HYDROCHLORIDE 10 MILLIGRAM(S): 5 TABLET ORAL at 11:30

## 2022-06-03 RX ADMIN — OXYCODONE HYDROCHLORIDE 10 MILLIGRAM(S): 5 TABLET ORAL at 04:12

## 2022-06-03 NOTE — PROGRESS NOTE ADULT - ATTENDING COMMENTS
The peroneal nerve function is not normal. He is unable to dorsiflex his left ankle and toes.  Awaiting improvement in edema before proceeding with ORIF.
seen and examined 05-30-22 @ 1130    afeb  AVSS  left knee spanning ex-fix in place    open left tibial plateau fracture  -5/29/2022 - debridement and repair of wound / external fixation    right leg wound  -5/29/2022 - debridement and repair of wound      transfer to ortho for further care
The patient has weakness in the peroneal nerve distribution
The patient was examined on 6/3/2022 at 5pm.His family was at bedside.  Left leg edema improving .No blisters.No sign of infection.  Left ankle :No active dorsiflexion. EHL 2/5.Sensation present in first webspace.

## 2022-06-04 LAB
ANION GAP SERPL CALC-SCNC: 11 MMOL/L — SIGNIFICANT CHANGE UP (ref 5–17)
ANION GAP SERPL CALC-SCNC: 9 MMOL/L — SIGNIFICANT CHANGE UP (ref 5–17)
APTT BLD: 30.5 SEC — SIGNIFICANT CHANGE UP (ref 27.5–35.5)
BLD GP AB SCN SERPL QL: NEGATIVE — SIGNIFICANT CHANGE UP
BUN SERPL-MCNC: 22 MG/DL — SIGNIFICANT CHANGE UP (ref 7–23)
BUN SERPL-MCNC: 23 MG/DL — SIGNIFICANT CHANGE UP (ref 7–23)
CALCIUM SERPL-MCNC: 8.9 MG/DL — SIGNIFICANT CHANGE UP (ref 8.4–10.5)
CALCIUM SERPL-MCNC: 9.5 MG/DL — SIGNIFICANT CHANGE UP (ref 8.4–10.5)
CHLORIDE SERPL-SCNC: 100 MMOL/L — SIGNIFICANT CHANGE UP (ref 96–108)
CHLORIDE SERPL-SCNC: 99 MMOL/L — SIGNIFICANT CHANGE UP (ref 96–108)
CO2 SERPL-SCNC: 27 MMOL/L — SIGNIFICANT CHANGE UP (ref 22–31)
CO2 SERPL-SCNC: 29 MMOL/L — SIGNIFICANT CHANGE UP (ref 22–31)
CREAT SERPL-MCNC: 0.92 MG/DL — SIGNIFICANT CHANGE UP (ref 0.5–1.3)
CREAT SERPL-MCNC: 1.07 MG/DL — SIGNIFICANT CHANGE UP (ref 0.5–1.3)
EGFR: 80 ML/MIN/1.73M2 — SIGNIFICANT CHANGE UP
EGFR: 96 ML/MIN/1.73M2 — SIGNIFICANT CHANGE UP
GLUCOSE SERPL-MCNC: 101 MG/DL — HIGH (ref 70–99)
GLUCOSE SERPL-MCNC: 179 MG/DL — HIGH (ref 70–99)
HCT VFR BLD CALC: 29.1 % — LOW (ref 39–50)
HCT VFR BLD CALC: 31.3 % — LOW (ref 39–50)
HGB BLD-MCNC: 10.3 G/DL — LOW (ref 13–17)
HGB BLD-MCNC: 9.9 G/DL — LOW (ref 13–17)
INR BLD: 1.09 RATIO — SIGNIFICANT CHANGE UP (ref 0.88–1.16)
MCHC RBC-ENTMCNC: 31 PG — SIGNIFICANT CHANGE UP (ref 27–34)
MCHC RBC-ENTMCNC: 31.9 PG — SIGNIFICANT CHANGE UP (ref 27–34)
MCHC RBC-ENTMCNC: 32.9 GM/DL — SIGNIFICANT CHANGE UP (ref 32–36)
MCHC RBC-ENTMCNC: 34 GM/DL — SIGNIFICANT CHANGE UP (ref 32–36)
MCV RBC AUTO: 93.9 FL — SIGNIFICANT CHANGE UP (ref 80–100)
MCV RBC AUTO: 94.3 FL — SIGNIFICANT CHANGE UP (ref 80–100)
NRBC # BLD: 0 /100 WBCS — SIGNIFICANT CHANGE UP (ref 0–0)
NRBC # BLD: 0 /100 WBCS — SIGNIFICANT CHANGE UP (ref 0–0)
PLATELET # BLD AUTO: 327 K/UL — SIGNIFICANT CHANGE UP (ref 150–400)
PLATELET # BLD AUTO: 361 K/UL — SIGNIFICANT CHANGE UP (ref 150–400)
POTASSIUM SERPL-MCNC: 4.3 MMOL/L — SIGNIFICANT CHANGE UP (ref 3.5–5.3)
POTASSIUM SERPL-MCNC: 4.5 MMOL/L — SIGNIFICANT CHANGE UP (ref 3.5–5.3)
POTASSIUM SERPL-SCNC: 4.3 MMOL/L — SIGNIFICANT CHANGE UP (ref 3.5–5.3)
POTASSIUM SERPL-SCNC: 4.5 MMOL/L — SIGNIFICANT CHANGE UP (ref 3.5–5.3)
PROTHROM AB SERPL-ACNC: 12.7 SEC — SIGNIFICANT CHANGE UP (ref 10.5–13.4)
RBC # BLD: 3.1 M/UL — LOW (ref 4.2–5.8)
RBC # BLD: 3.32 M/UL — LOW (ref 4.2–5.8)
RBC # FLD: 12.6 % — SIGNIFICANT CHANGE UP (ref 10.3–14.5)
RBC # FLD: 12.8 % — SIGNIFICANT CHANGE UP (ref 10.3–14.5)
RH IG SCN BLD-IMP: POSITIVE — SIGNIFICANT CHANGE UP
SODIUM SERPL-SCNC: 137 MMOL/L — SIGNIFICANT CHANGE UP (ref 135–145)
SODIUM SERPL-SCNC: 138 MMOL/L — SIGNIFICANT CHANGE UP (ref 135–145)
WBC # BLD: 10.3 K/UL — SIGNIFICANT CHANGE UP (ref 3.8–10.5)
WBC # BLD: 6.19 K/UL — SIGNIFICANT CHANGE UP (ref 3.8–10.5)
WBC # FLD AUTO: 10.3 K/UL — SIGNIFICANT CHANGE UP (ref 3.8–10.5)
WBC # FLD AUTO: 6.19 K/UL — SIGNIFICANT CHANGE UP (ref 3.8–10.5)

## 2022-06-04 PROCEDURE — 27536 TREAT KNEE FRACTURE: CPT | Mod: 58,LT

## 2022-06-04 PROCEDURE — 20694 RMVL EXT FIXJ SYS UNDER ANES: CPT | Mod: 58,LT

## 2022-06-04 PROCEDURE — 73590 X-RAY EXAM OF LOWER LEG: CPT | Mod: 26,LT

## 2022-06-04 PROCEDURE — 11011 DEBRIDE SKIN MUSC AT FX SITE: CPT | Mod: 58,LT

## 2022-06-04 DEVICE — IMPLANTABLE DEVICE: Type: IMPLANTABLE DEVICE | Site: LEFT | Status: FUNCTIONAL

## 2022-06-04 DEVICE — SCREW CORT S-T 3.5X55MM: Type: IMPLANTABLE DEVICE | Site: LEFT | Status: FUNCTIONAL

## 2022-06-04 DEVICE — SCREW CORT S-T 4.5X32MM: Type: IMPLANTABLE DEVICE | Site: LEFT | Status: FUNCTIONAL

## 2022-06-04 DEVICE — SCR CORT S-T 4.5X30MM: Type: IMPLANTABLE DEVICE | Site: LEFT | Status: FUNCTIONAL

## 2022-06-04 DEVICE — SCREW CORT S-T 4.5X28MM: Type: IMPLANTABLE DEVICE | Site: LEFT | Status: FUNCTIONAL

## 2022-06-04 DEVICE — SCREW CORT S-T 3.5X28MM: Type: IMPLANTABLE DEVICE | Site: LEFT | Status: FUNCTIONAL

## 2022-06-04 DEVICE — GWIRE DRILL TIP 2.5X200MM: Type: IMPLANTABLE DEVICE | Site: LEFT | Status: FUNCTIONAL

## 2022-06-04 DEVICE — SCREW CORT S-T 3.5X60MM: Type: IMPLANTABLE DEVICE | Site: LEFT | Status: FUNCTIONAL

## 2022-06-04 DEVICE — SCREW CORT S-T 3.5X24MM: Type: IMPLANTABLE DEVICE | Site: LEFT | Status: FUNCTIONAL

## 2022-06-04 DEVICE — SCREW CORT S-T 3.5X50MM: Type: IMPLANTABLE DEVICE | Site: LEFT | Status: FUNCTIONAL

## 2022-06-04 DEVICE — SCREW CORT S-T 3.5X32MM: Type: IMPLANTABLE DEVICE | Site: LEFT | Status: FUNCTIONAL

## 2022-06-04 DEVICE — SCREW LOKG SLF-TPNG W/ STARDRIVE RECESS 3.5X24MM: Type: IMPLANTABLE DEVICE | Site: LEFT | Status: FUNCTIONAL

## 2022-06-04 RX ORDER — OXYCODONE HYDROCHLORIDE 5 MG/1
5 TABLET ORAL
Refills: 0 | Status: DISCONTINUED | OUTPATIENT
Start: 2022-06-04 | End: 2022-06-08

## 2022-06-04 RX ORDER — HYDROMORPHONE HYDROCHLORIDE 2 MG/ML
1 INJECTION INTRAMUSCULAR; INTRAVENOUS; SUBCUTANEOUS EVERY 4 HOURS
Refills: 0 | Status: DISCONTINUED | OUTPATIENT
Start: 2022-06-04 | End: 2022-06-07

## 2022-06-04 RX ORDER — CEFAZOLIN SODIUM 1 G
2000 VIAL (EA) INJECTION EVERY 8 HOURS
Refills: 0 | Status: COMPLETED | OUTPATIENT
Start: 2022-06-04 | End: 2022-06-04

## 2022-06-04 RX ORDER — ACETAMINOPHEN 500 MG
975 TABLET ORAL EVERY 8 HOURS
Refills: 0 | Status: DISCONTINUED | OUTPATIENT
Start: 2022-06-04 | End: 2022-06-08

## 2022-06-04 RX ORDER — APIXABAN 2.5 MG/1
2.5 TABLET, FILM COATED ORAL EVERY 12 HOURS
Refills: 0 | Status: DISCONTINUED | OUTPATIENT
Start: 2022-06-04 | End: 2022-06-05

## 2022-06-04 RX ORDER — TRAMADOL HYDROCHLORIDE 50 MG/1
50 TABLET ORAL EVERY 6 HOURS
Refills: 0 | Status: DISCONTINUED | OUTPATIENT
Start: 2022-06-04 | End: 2022-06-08

## 2022-06-04 RX ORDER — ACETAMINOPHEN 500 MG
1000 TABLET ORAL ONCE
Refills: 0 | Status: COMPLETED | OUTPATIENT
Start: 2022-06-04 | End: 2022-06-04

## 2022-06-04 RX ORDER — ONDANSETRON 8 MG/1
4 TABLET, FILM COATED ORAL ONCE
Refills: 0 | Status: COMPLETED | OUTPATIENT
Start: 2022-06-04 | End: 2022-06-04

## 2022-06-04 RX ORDER — HYDROMORPHONE HYDROCHLORIDE 2 MG/ML
0.5 INJECTION INTRAMUSCULAR; INTRAVENOUS; SUBCUTANEOUS
Refills: 0 | Status: DISCONTINUED | OUTPATIENT
Start: 2022-06-04 | End: 2022-06-04

## 2022-06-04 RX ORDER — SENNA PLUS 8.6 MG/1
2 TABLET ORAL AT BEDTIME
Refills: 0 | Status: DISCONTINUED | OUTPATIENT
Start: 2022-06-04 | End: 2022-06-08

## 2022-06-04 RX ORDER — MAGNESIUM HYDROXIDE 400 MG/1
30 TABLET, CHEWABLE ORAL DAILY
Refills: 0 | Status: DISCONTINUED | OUTPATIENT
Start: 2022-06-04 | End: 2022-06-08

## 2022-06-04 RX ORDER — OXYCODONE HYDROCHLORIDE 5 MG/1
10 TABLET ORAL
Refills: 0 | Status: DISCONTINUED | OUTPATIENT
Start: 2022-06-04 | End: 2022-06-08

## 2022-06-04 RX ORDER — KETOROLAC TROMETHAMINE 30 MG/ML
30 SYRINGE (ML) INJECTION EVERY 6 HOURS
Refills: 0 | Status: DISCONTINUED | OUTPATIENT
Start: 2022-06-04 | End: 2022-06-07

## 2022-06-04 RX ORDER — SODIUM CHLORIDE 9 MG/ML
1000 INJECTION INTRAMUSCULAR; INTRAVENOUS; SUBCUTANEOUS
Refills: 0 | Status: DISCONTINUED | OUTPATIENT
Start: 2022-06-04 | End: 2022-06-08

## 2022-06-04 RX ORDER — POLYETHYLENE GLYCOL 3350 17 G/17G
17 POWDER, FOR SOLUTION ORAL DAILY
Refills: 0 | Status: DISCONTINUED | OUTPATIENT
Start: 2022-06-04 | End: 2022-06-08

## 2022-06-04 RX ADMIN — OXYCODONE HYDROCHLORIDE 10 MILLIGRAM(S): 5 TABLET ORAL at 00:40

## 2022-06-04 RX ADMIN — Medication 30 MILLIGRAM(S): at 17:46

## 2022-06-04 RX ADMIN — SODIUM CHLORIDE 125 MILLILITER(S): 9 INJECTION INTRAMUSCULAR; INTRAVENOUS; SUBCUTANEOUS at 15:29

## 2022-06-04 RX ADMIN — HYDROMORPHONE HYDROCHLORIDE 0.5 MILLIGRAM(S): 2 INJECTION INTRAMUSCULAR; INTRAVENOUS; SUBCUTANEOUS at 12:45

## 2022-06-04 RX ADMIN — Medication 975 MILLIGRAM(S): at 06:50

## 2022-06-04 RX ADMIN — OXYCODONE HYDROCHLORIDE 10 MILLIGRAM(S): 5 TABLET ORAL at 18:16

## 2022-06-04 RX ADMIN — Medication 30 MILLIGRAM(S): at 23:49

## 2022-06-04 RX ADMIN — OXYCODONE HYDROCHLORIDE 10 MILLIGRAM(S): 5 TABLET ORAL at 04:40

## 2022-06-04 RX ADMIN — OXYCODONE HYDROCHLORIDE 10 MILLIGRAM(S): 5 TABLET ORAL at 04:12

## 2022-06-04 RX ADMIN — OXYCODONE HYDROCHLORIDE 10 MILLIGRAM(S): 5 TABLET ORAL at 00:06

## 2022-06-04 RX ADMIN — SENNA PLUS 2 TABLET(S): 8.6 TABLET ORAL at 21:32

## 2022-06-04 RX ADMIN — OXYCODONE HYDROCHLORIDE 10 MILLIGRAM(S): 5 TABLET ORAL at 13:15

## 2022-06-04 RX ADMIN — OXYCODONE HYDROCHLORIDE 10 MILLIGRAM(S): 5 TABLET ORAL at 22:31

## 2022-06-04 RX ADMIN — SODIUM CHLORIDE 100 MILLILITER(S): 9 INJECTION INTRAMUSCULAR; INTRAVENOUS; SUBCUTANEOUS at 12:16

## 2022-06-04 RX ADMIN — HYDROMORPHONE HYDROCHLORIDE 0.5 MILLIGRAM(S): 2 INJECTION INTRAMUSCULAR; INTRAVENOUS; SUBCUTANEOUS at 13:00

## 2022-06-04 RX ADMIN — HYDROMORPHONE HYDROCHLORIDE 0.5 MILLIGRAM(S): 2 INJECTION INTRAMUSCULAR; INTRAVENOUS; SUBCUTANEOUS at 12:30

## 2022-06-04 RX ADMIN — ONDANSETRON 4 MILLIGRAM(S): 8 TABLET, FILM COATED ORAL at 12:41

## 2022-06-04 RX ADMIN — POLYETHYLENE GLYCOL 3350 17 GRAM(S): 17 POWDER, FOR SOLUTION ORAL at 21:32

## 2022-06-04 RX ADMIN — HYDROMORPHONE HYDROCHLORIDE 1 MILLIGRAM(S): 2 INJECTION INTRAMUSCULAR; INTRAVENOUS; SUBCUTANEOUS at 16:27

## 2022-06-04 RX ADMIN — Medication 100 MILLIGRAM(S): at 23:49

## 2022-06-04 RX ADMIN — Medication 400 MILLIGRAM(S): at 14:00

## 2022-06-04 RX ADMIN — HYDROMORPHONE HYDROCHLORIDE 1 MILLIGRAM(S): 2 INJECTION INTRAMUSCULAR; INTRAVENOUS; SUBCUTANEOUS at 16:45

## 2022-06-04 RX ADMIN — Medication 100 MILLIGRAM(S): at 15:28

## 2022-06-04 RX ADMIN — Medication 975 MILLIGRAM(S): at 21:31

## 2022-06-04 RX ADMIN — Medication 975 MILLIGRAM(S): at 06:19

## 2022-06-04 RX ADMIN — HYDROMORPHONE HYDROCHLORIDE 0.5 MILLIGRAM(S): 2 INJECTION INTRAMUSCULAR; INTRAVENOUS; SUBCUTANEOUS at 12:15

## 2022-06-04 RX ADMIN — Medication 975 MILLIGRAM(S): at 22:31

## 2022-06-04 RX ADMIN — MORPHINE SULFATE 2 MILLIGRAM(S): 50 CAPSULE, EXTENDED RELEASE ORAL at 06:35

## 2022-06-04 RX ADMIN — MORPHINE SULFATE 2 MILLIGRAM(S): 50 CAPSULE, EXTENDED RELEASE ORAL at 06:20

## 2022-06-04 RX ADMIN — HYDROMORPHONE HYDROCHLORIDE 1 MILLIGRAM(S): 2 INJECTION INTRAMUSCULAR; INTRAVENOUS; SUBCUTANEOUS at 23:48

## 2022-06-04 RX ADMIN — OXYCODONE HYDROCHLORIDE 10 MILLIGRAM(S): 5 TABLET ORAL at 21:31

## 2022-06-04 RX ADMIN — HYDROMORPHONE HYDROCHLORIDE 0.5 MILLIGRAM(S): 2 INJECTION INTRAMUSCULAR; INTRAVENOUS; SUBCUTANEOUS at 13:15

## 2022-06-04 RX ADMIN — APIXABAN 2.5 MILLIGRAM(S): 2.5 TABLET, FILM COATED ORAL at 21:35

## 2022-06-04 RX ADMIN — Medication 30 MILLIGRAM(S): at 18:12

## 2022-06-04 NOTE — BRIEF OPERATIVE NOTE - OPERATION/FINDINGS
left tibia external fixation, I+D with primary closure  right tibia wound I+D with primary closure
left tibial plateau fracture

## 2022-06-04 NOTE — BRIEF OPERATIVE NOTE - NSICDXBRIEFPOSTOP_GEN_ALL_CORE_FT
POST-OP DIAGNOSIS:  Fracture, tibia 29-May-2022 14:36:58  Ezio Ford  
POST-OP DIAGNOSIS:  Tibial plateau fracture, left 04-Jun-2022 12:20:21  Alan Whitten

## 2022-06-04 NOTE — BRIEF OPERATIVE NOTE - NSICDXBRIEFPROCEDURE_GEN_ALL_CORE_FT
PROCEDURES:  Open treatment, fracture, tibia 04-Jun-2022 12:19:38  Alan Whitten  Remov ext immobilization 04-Jun-2022 12:20:04  Alan Whitten  
PROCEDURES:  External fixation of left tibial plateau 29-May-2022 14:36:31  Ezio Ford

## 2022-06-04 NOTE — CHART NOTE - NSCHARTNOTEFT_GEN_A_CORE
Patient seen in PACU with complaint of LLE pain.  No Chest Pain, SOB, N/V.    T(C): 36.6 (06-04-22 @ 12:00), Max: 37.4 (06-03-22 @ 23:37)  HR: 94 (06-04-22 @ 13:30) (57 - 101)  BP: 125/92 (06-04-22 @ 13:30) (125/92 - 164/88)  RR: 14 (06-04-22 @ 13:30) (14 - 18)  SpO2: 95% (06-04-22 @ 13:30) (94% - 98%)  Wt(kg): --    Exam:  Alert and Oriented, No Acute Distress  Laterality: LLE wrapped in ace, C/D/I in knee immobilizer  Calves soft, non-tender bilaterally  +PF/DF/EHL/FHL  SILT  + DP pulse    Xray:----                          9.9    10.30 )-----------( 361      ( 04 Jun 2022 13:10 )             29.1    06-04    137  |  99  |  23  ----------------------------<  179<H>  4.5   |  27  |  0.92    Ca    8.9      04 Jun 2022 13:10        A/P: 57yMale S/p L tibial plateau ORIF/ex fix removal . VSS. NAD  -PT/OT-WBAT LLE, OOB when tolerated  -IS encouraged  -DVT PPx with eliquis  -Followup AM labs  -Pain Control  -PACU to floor    Kassy Payton PA-C  Team Pager #[phone]

## 2022-06-04 NOTE — BRIEF OPERATIVE NOTE - NSICDXBRIEFPREOP_GEN_ALL_CORE_FT
PRE-OP DIAGNOSIS:  Tibial plateau fracture, left 04-Jun-2022 12:20:15  Alan Whitten  
PRE-OP DIAGNOSIS:  Fracture, tibia 29-May-2022 14:36:52  Eizo Ford

## 2022-06-05 DIAGNOSIS — Z29.9 ENCOUNTER FOR PROPHYLACTIC MEASURES, UNSPECIFIED: ICD-10-CM

## 2022-06-05 LAB
ANION GAP SERPL CALC-SCNC: 9 MMOL/L — SIGNIFICANT CHANGE UP (ref 5–17)
BUN SERPL-MCNC: 25 MG/DL — HIGH (ref 7–23)
CALCIUM SERPL-MCNC: 8.9 MG/DL — SIGNIFICANT CHANGE UP (ref 8.4–10.5)
CHLORIDE SERPL-SCNC: 102 MMOL/L — SIGNIFICANT CHANGE UP (ref 96–108)
CO2 SERPL-SCNC: 27 MMOL/L — SIGNIFICANT CHANGE UP (ref 22–31)
CREAT SERPL-MCNC: 0.97 MG/DL — SIGNIFICANT CHANGE UP (ref 0.5–1.3)
EGFR: 90 ML/MIN/1.73M2 — SIGNIFICANT CHANGE UP
GLUCOSE SERPL-MCNC: 120 MG/DL — HIGH (ref 70–99)
HCT VFR BLD CALC: 25.7 % — LOW (ref 39–50)
HCT VFR BLD CALC: 26.5 % — LOW (ref 39–50)
HGB BLD-MCNC: 8.5 G/DL — LOW (ref 13–17)
HGB BLD-MCNC: 8.5 G/DL — LOW (ref 13–17)
MCHC RBC-ENTMCNC: 31 PG — SIGNIFICANT CHANGE UP (ref 27–34)
MCHC RBC-ENTMCNC: 31.8 PG — SIGNIFICANT CHANGE UP (ref 27–34)
MCHC RBC-ENTMCNC: 32.1 GM/DL — SIGNIFICANT CHANGE UP (ref 32–36)
MCHC RBC-ENTMCNC: 33.1 GM/DL — SIGNIFICANT CHANGE UP (ref 32–36)
MCV RBC AUTO: 96.3 FL — SIGNIFICANT CHANGE UP (ref 80–100)
MCV RBC AUTO: 96.7 FL — SIGNIFICANT CHANGE UP (ref 80–100)
NRBC # BLD: 0 /100 WBCS — SIGNIFICANT CHANGE UP (ref 0–0)
NRBC # BLD: 0 /100 WBCS — SIGNIFICANT CHANGE UP (ref 0–0)
PLATELET # BLD AUTO: 346 K/UL — SIGNIFICANT CHANGE UP (ref 150–400)
PLATELET # BLD AUTO: 374 K/UL — SIGNIFICANT CHANGE UP (ref 150–400)
POTASSIUM SERPL-MCNC: 4.1 MMOL/L — SIGNIFICANT CHANGE UP (ref 3.5–5.3)
POTASSIUM SERPL-SCNC: 4.1 MMOL/L — SIGNIFICANT CHANGE UP (ref 3.5–5.3)
RBC # BLD: 2.67 M/UL — LOW (ref 4.2–5.8)
RBC # BLD: 2.74 M/UL — LOW (ref 4.2–5.8)
RBC # FLD: 12.9 % — SIGNIFICANT CHANGE UP (ref 10.3–14.5)
RBC # FLD: 12.9 % — SIGNIFICANT CHANGE UP (ref 10.3–14.5)
SODIUM SERPL-SCNC: 138 MMOL/L — SIGNIFICANT CHANGE UP (ref 135–145)
WBC # BLD: 9.04 K/UL — SIGNIFICANT CHANGE UP (ref 3.8–10.5)
WBC # BLD: 9.25 K/UL — SIGNIFICANT CHANGE UP (ref 3.8–10.5)
WBC # FLD AUTO: 9.04 K/UL — SIGNIFICANT CHANGE UP (ref 3.8–10.5)
WBC # FLD AUTO: 9.25 K/UL — SIGNIFICANT CHANGE UP (ref 3.8–10.5)

## 2022-06-05 PROCEDURE — 93971 EXTREMITY STUDY: CPT | Mod: 26,RT

## 2022-06-05 RX ORDER — APIXABAN 2.5 MG/1
10 TABLET, FILM COATED ORAL EVERY 12 HOURS
Refills: 0 | Status: DISCONTINUED | OUTPATIENT
Start: 2022-06-05 | End: 2022-06-06

## 2022-06-05 RX ADMIN — APIXABAN 2.5 MILLIGRAM(S): 2.5 TABLET, FILM COATED ORAL at 09:33

## 2022-06-05 RX ADMIN — SENNA PLUS 2 TABLET(S): 8.6 TABLET ORAL at 21:20

## 2022-06-05 RX ADMIN — Medication 30 MILLIGRAM(S): at 17:35

## 2022-06-05 RX ADMIN — OXYCODONE HYDROCHLORIDE 10 MILLIGRAM(S): 5 TABLET ORAL at 04:18

## 2022-06-05 RX ADMIN — Medication 30 MILLIGRAM(S): at 13:00

## 2022-06-05 RX ADMIN — OXYCODONE HYDROCHLORIDE 10 MILLIGRAM(S): 5 TABLET ORAL at 05:18

## 2022-06-05 RX ADMIN — OXYCODONE HYDROCHLORIDE 10 MILLIGRAM(S): 5 TABLET ORAL at 21:20

## 2022-06-05 RX ADMIN — HYDROMORPHONE HYDROCHLORIDE 1 MILLIGRAM(S): 2 INJECTION INTRAMUSCULAR; INTRAVENOUS; SUBCUTANEOUS at 11:30

## 2022-06-05 RX ADMIN — OXYCODONE HYDROCHLORIDE 10 MILLIGRAM(S): 5 TABLET ORAL at 13:51

## 2022-06-05 RX ADMIN — HYDROMORPHONE HYDROCHLORIDE 1 MILLIGRAM(S): 2 INJECTION INTRAMUSCULAR; INTRAVENOUS; SUBCUTANEOUS at 00:30

## 2022-06-05 RX ADMIN — Medication 30 MILLIGRAM(S): at 17:05

## 2022-06-05 RX ADMIN — OXYCODONE HYDROCHLORIDE 10 MILLIGRAM(S): 5 TABLET ORAL at 21:50

## 2022-06-05 RX ADMIN — Medication 975 MILLIGRAM(S): at 21:50

## 2022-06-05 RX ADMIN — Medication 975 MILLIGRAM(S): at 04:18

## 2022-06-05 RX ADMIN — OXYCODONE HYDROCHLORIDE 10 MILLIGRAM(S): 5 TABLET ORAL at 12:51

## 2022-06-05 RX ADMIN — Medication 30 MILLIGRAM(S): at 04:18

## 2022-06-05 RX ADMIN — Medication 975 MILLIGRAM(S): at 15:59

## 2022-06-05 RX ADMIN — HYDROMORPHONE HYDROCHLORIDE 1 MILLIGRAM(S): 2 INJECTION INTRAMUSCULAR; INTRAVENOUS; SUBCUTANEOUS at 11:05

## 2022-06-05 RX ADMIN — Medication 975 MILLIGRAM(S): at 21:20

## 2022-06-05 RX ADMIN — Medication 975 MILLIGRAM(S): at 14:59

## 2022-06-05 RX ADMIN — OXYCODONE HYDROCHLORIDE 10 MILLIGRAM(S): 5 TABLET ORAL at 09:33

## 2022-06-05 RX ADMIN — MAGNESIUM HYDROXIDE 30 MILLILITER(S): 400 TABLET, CHEWABLE ORAL at 21:20

## 2022-06-05 RX ADMIN — APIXABAN 10 MILLIGRAM(S): 2.5 TABLET, FILM COATED ORAL at 17:04

## 2022-06-05 RX ADMIN — Medication 30 MILLIGRAM(S): at 12:33

## 2022-06-05 RX ADMIN — Medication 30 MILLIGRAM(S): at 05:18

## 2022-06-05 RX ADMIN — Medication 30 MILLIGRAM(S): at 23:21

## 2022-06-05 RX ADMIN — Medication 975 MILLIGRAM(S): at 05:18

## 2022-06-05 RX ADMIN — POLYETHYLENE GLYCOL 3350 17 GRAM(S): 17 POWDER, FOR SOLUTION ORAL at 12:33

## 2022-06-05 RX ADMIN — Medication 30 MILLIGRAM(S): at 00:30

## 2022-06-05 RX ADMIN — OXYCODONE HYDROCHLORIDE 10 MILLIGRAM(S): 5 TABLET ORAL at 10:33

## 2022-06-05 NOTE — PHYSICAL THERAPY INITIAL EVALUATION ADULT - RANGE OF MOTION EXAMINATION, REHAB EVAL
WFL PROM except LLE knee/ankle limited 2/2 surgery/bilateral upper extremity ROM was WFL (within functional limits)/bilateral lower extremity ROM was WFL (within functional limits)
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
## 2022-06-05 NOTE — PHYSICAL THERAPY INITIAL EVALUATION ADULT - PLANNED THERAPY INTERVENTIONS, PT EVAL
bed mobility training/gait training/transfer training
balance training/bed mobility training/gait training/transfer training

## 2022-06-05 NOTE — CONSULT NOTE ADULT - REASON FOR ADMISSION
B/L LE open fractures
bilateral tibia injuries  Left open tibia  right shin wound
bilateral tibia injuries  Left open tibia  right shin wound

## 2022-06-05 NOTE — PHYSICAL THERAPY INITIAL EVALUATION ADULT - ADDITIONAL COMMENTS
pt lives in private house with family, no stairs to enter, flight of stairs to bedroom. pt independent with mobility and did not use assistive device
pt lives in private house with family, no stairs to enter, flight of stairs to bedroom. pt independent with mobility and did not use assistive device

## 2022-06-05 NOTE — PHYSICAL THERAPY INITIAL EVALUATION ADULT - IMPAIRED TRANSFERS: SIT/STAND, REHAB EVAL
impaired balance/decreased flexibility/pain/decreased strength
impaired balance/pain/decreased ROM/decreased strength

## 2022-06-05 NOTE — PHYSICAL THERAPY INITIAL EVALUATION ADULT - CRITERIA FOR SKILLED THERAPEUTIC INTERVENTIONS
impairments found
impairments found/therapy frequency/predicted duration of therapy intervention/anticipated equipment needs at discharge/anticipated discharge recommendation

## 2022-06-05 NOTE — PHYSICAL THERAPY INITIAL EVALUATION ADULT - DID THE PATIENT HAVE SURGERY?
s/p L LE tibia ex fix, R LE I&D with closure/yes
S/p removal external immobilization and open treatment tibia fracture/yes

## 2022-06-05 NOTE — PHYSICAL THERAPY INITIAL EVALUATION ADULT - GENERAL OBSERVATIONS, REHAB EVAL
pt recd supine in bed (+)IV (+)left Ex fix; family at bedside, resident at bedside
Received semisupine +IVL, +splint to LLE +knee immobilizer
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
## 2022-06-05 NOTE — PHYSICAL THERAPY INITIAL EVALUATION ADULT - TRANSFER TRAINING, PT EVAL
GOAL: Pt will perform all transfers independently with appropriate AD in 2 weeks.
GOAL: Pt will transfer sit-stand with appropriate assistive device independently by 2weeks
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
## 2022-06-05 NOTE — PHYSICAL THERAPY INITIAL EVALUATION ADULT - BED MOBILITY TRAINING, PT EVAL
GOAL: Pt will perform all bed mobility independently to prevent skin breakdown in 2 weeks.
GOAL: pt will perform bed mob independently by 2 weeks

## 2022-06-05 NOTE — PHYSICAL THERAPY INITIAL EVALUATION ADULT - GAIT TRAINING, PT EVAL
GOAL: pt will amb 200ft with appropriate assistive device independently by 2 weeks
GOAL: Pt will amb 150' independently with RW in 2 weeks.

## 2022-06-06 PROCEDURE — 99221 1ST HOSP IP/OBS SF/LOW 40: CPT

## 2022-06-06 RX ORDER — APIXABAN 2.5 MG/1
10 TABLET, FILM COATED ORAL EVERY 12 HOURS
Refills: 0 | Status: DISCONTINUED | OUTPATIENT
Start: 2022-06-05 | End: 2022-06-08

## 2022-06-06 RX ADMIN — Medication 30 MILLIGRAM(S): at 06:11

## 2022-06-06 RX ADMIN — Medication 30 MILLIGRAM(S): at 23:35

## 2022-06-06 RX ADMIN — OXYCODONE HYDROCHLORIDE 10 MILLIGRAM(S): 5 TABLET ORAL at 20:01

## 2022-06-06 RX ADMIN — OXYCODONE HYDROCHLORIDE 10 MILLIGRAM(S): 5 TABLET ORAL at 00:53

## 2022-06-06 RX ADMIN — Medication 975 MILLIGRAM(S): at 21:58

## 2022-06-06 RX ADMIN — Medication 30 MILLIGRAM(S): at 12:10

## 2022-06-06 RX ADMIN — OXYCODONE HYDROCHLORIDE 10 MILLIGRAM(S): 5 TABLET ORAL at 01:23

## 2022-06-06 RX ADMIN — Medication 30 MILLIGRAM(S): at 11:39

## 2022-06-06 RX ADMIN — APIXABAN 10 MILLIGRAM(S): 2.5 TABLET, FILM COATED ORAL at 17:36

## 2022-06-06 RX ADMIN — OXYCODONE HYDROCHLORIDE 10 MILLIGRAM(S): 5 TABLET ORAL at 04:10

## 2022-06-06 RX ADMIN — APIXABAN 10 MILLIGRAM(S): 2.5 TABLET, FILM COATED ORAL at 05:41

## 2022-06-06 RX ADMIN — SENNA PLUS 2 TABLET(S): 8.6 TABLET ORAL at 21:58

## 2022-06-06 RX ADMIN — OXYCODONE HYDROCHLORIDE 10 MILLIGRAM(S): 5 TABLET ORAL at 08:25

## 2022-06-06 RX ADMIN — Medication 975 MILLIGRAM(S): at 06:11

## 2022-06-06 RX ADMIN — OXYCODONE HYDROCHLORIDE 10 MILLIGRAM(S): 5 TABLET ORAL at 07:24

## 2022-06-06 RX ADMIN — POLYETHYLENE GLYCOL 3350 17 GRAM(S): 17 POWDER, FOR SOLUTION ORAL at 11:39

## 2022-06-06 RX ADMIN — OXYCODONE HYDROCHLORIDE 10 MILLIGRAM(S): 5 TABLET ORAL at 20:31

## 2022-06-06 RX ADMIN — OXYCODONE HYDROCHLORIDE 10 MILLIGRAM(S): 5 TABLET ORAL at 15:28

## 2022-06-06 RX ADMIN — Medication 30 MILLIGRAM(S): at 18:10

## 2022-06-06 RX ADMIN — Medication 975 MILLIGRAM(S): at 05:41

## 2022-06-06 RX ADMIN — OXYCODONE HYDROCHLORIDE 10 MILLIGRAM(S): 5 TABLET ORAL at 12:30

## 2022-06-06 RX ADMIN — OXYCODONE HYDROCHLORIDE 10 MILLIGRAM(S): 5 TABLET ORAL at 04:11

## 2022-06-06 RX ADMIN — Medication 975 MILLIGRAM(S): at 15:15

## 2022-06-06 RX ADMIN — Medication 975 MILLIGRAM(S): at 14:17

## 2022-06-06 RX ADMIN — Medication 30 MILLIGRAM(S): at 17:36

## 2022-06-06 RX ADMIN — Medication 30 MILLIGRAM(S): at 00:21

## 2022-06-06 RX ADMIN — OXYCODONE HYDROCHLORIDE 10 MILLIGRAM(S): 5 TABLET ORAL at 11:34

## 2022-06-06 RX ADMIN — OXYCODONE HYDROCHLORIDE 10 MILLIGRAM(S): 5 TABLET ORAL at 23:58

## 2022-06-06 RX ADMIN — Medication 30 MILLIGRAM(S): at 05:42

## 2022-06-07 ENCOUNTER — TRANSCRIPTION ENCOUNTER (OUTPATIENT)
Age: 57
End: 2022-06-07

## 2022-06-07 LAB
ANION GAP SERPL CALC-SCNC: 11 MMOL/L — SIGNIFICANT CHANGE UP (ref 5–17)
BUN SERPL-MCNC: 19 MG/DL — SIGNIFICANT CHANGE UP (ref 7–23)
CALCIUM SERPL-MCNC: 9.5 MG/DL — SIGNIFICANT CHANGE UP (ref 8.4–10.5)
CHLORIDE SERPL-SCNC: 99 MMOL/L — SIGNIFICANT CHANGE UP (ref 96–108)
CO2 SERPL-SCNC: 27 MMOL/L — SIGNIFICANT CHANGE UP (ref 22–31)
CREAT SERPL-MCNC: 0.96 MG/DL — SIGNIFICANT CHANGE UP (ref 0.5–1.3)
EGFR: 92 ML/MIN/1.73M2 — SIGNIFICANT CHANGE UP
GLUCOSE SERPL-MCNC: 178 MG/DL — HIGH (ref 70–99)
HCT VFR BLD CALC: 27.5 % — LOW (ref 39–50)
HGB BLD-MCNC: 9.1 G/DL — LOW (ref 13–17)
MCHC RBC-ENTMCNC: 31.6 PG — SIGNIFICANT CHANGE UP (ref 27–34)
MCHC RBC-ENTMCNC: 33.1 GM/DL — SIGNIFICANT CHANGE UP (ref 32–36)
MCV RBC AUTO: 95.5 FL — SIGNIFICANT CHANGE UP (ref 80–100)
NRBC # BLD: 0 /100 WBCS — SIGNIFICANT CHANGE UP (ref 0–0)
PLATELET # BLD AUTO: 463 K/UL — HIGH (ref 150–400)
POTASSIUM SERPL-MCNC: 4.2 MMOL/L — SIGNIFICANT CHANGE UP (ref 3.5–5.3)
POTASSIUM SERPL-SCNC: 4.2 MMOL/L — SIGNIFICANT CHANGE UP (ref 3.5–5.3)
RBC # BLD: 2.88 M/UL — LOW (ref 4.2–5.8)
RBC # FLD: 12.7 % — SIGNIFICANT CHANGE UP (ref 10.3–14.5)
SARS-COV-2 RNA SPEC QL NAA+PROBE: SIGNIFICANT CHANGE UP
SODIUM SERPL-SCNC: 137 MMOL/L — SIGNIFICANT CHANGE UP (ref 135–145)
WBC # BLD: 8.08 K/UL — SIGNIFICANT CHANGE UP (ref 3.8–10.5)
WBC # FLD AUTO: 8.08 K/UL — SIGNIFICANT CHANGE UP (ref 3.8–10.5)

## 2022-06-07 RX ORDER — ACETAMINOPHEN 500 MG
3 TABLET ORAL
Qty: 0 | Refills: 0 | DISCHARGE
Start: 2022-06-07

## 2022-06-07 RX ORDER — APIXABAN 2.5 MG/1
2 TABLET, FILM COATED ORAL
Qty: 0 | Refills: 0 | DISCHARGE
Start: 2022-06-07

## 2022-06-07 RX ORDER — OXYCODONE HYDROCHLORIDE 5 MG/1
1 TABLET ORAL
Qty: 0 | Refills: 0 | DISCHARGE
Start: 2022-06-07

## 2022-06-07 RX ORDER — TRAMADOL HYDROCHLORIDE 50 MG/1
1 TABLET ORAL
Qty: 0 | Refills: 0 | DISCHARGE
Start: 2022-06-07

## 2022-06-07 RX ORDER — POLYETHYLENE GLYCOL 3350 17 G/17G
17 POWDER, FOR SOLUTION ORAL
Qty: 0 | Refills: 0 | DISCHARGE
Start: 2022-06-07

## 2022-06-07 RX ORDER — SENNA PLUS 8.6 MG/1
2 TABLET ORAL
Qty: 0 | Refills: 0 | DISCHARGE
Start: 2022-06-07

## 2022-06-07 RX ADMIN — OXYCODONE HYDROCHLORIDE 10 MILLIGRAM(S): 5 TABLET ORAL at 21:35

## 2022-06-07 RX ADMIN — OXYCODONE HYDROCHLORIDE 10 MILLIGRAM(S): 5 TABLET ORAL at 13:08

## 2022-06-07 RX ADMIN — APIXABAN 10 MILLIGRAM(S): 2.5 TABLET, FILM COATED ORAL at 17:04

## 2022-06-07 RX ADMIN — HYDROMORPHONE HYDROCHLORIDE 1 MILLIGRAM(S): 2 INJECTION INTRAMUSCULAR; INTRAVENOUS; SUBCUTANEOUS at 14:30

## 2022-06-07 RX ADMIN — Medication 30 MILLIGRAM(S): at 05:43

## 2022-06-07 RX ADMIN — Medication 975 MILLIGRAM(S): at 21:00

## 2022-06-07 RX ADMIN — Medication 975 MILLIGRAM(S): at 16:31

## 2022-06-07 RX ADMIN — APIXABAN 10 MILLIGRAM(S): 2.5 TABLET, FILM COATED ORAL at 05:43

## 2022-06-07 RX ADMIN — OXYCODONE HYDROCHLORIDE 10 MILLIGRAM(S): 5 TABLET ORAL at 00:28

## 2022-06-07 RX ADMIN — SENNA PLUS 2 TABLET(S): 8.6 TABLET ORAL at 21:02

## 2022-06-07 RX ADMIN — Medication 975 MILLIGRAM(S): at 06:13

## 2022-06-07 RX ADMIN — OXYCODONE HYDROCHLORIDE 10 MILLIGRAM(S): 5 TABLET ORAL at 18:17

## 2022-06-07 RX ADMIN — HYDROMORPHONE HYDROCHLORIDE 1 MILLIGRAM(S): 2 INJECTION INTRAMUSCULAR; INTRAVENOUS; SUBCUTANEOUS at 15:08

## 2022-06-07 RX ADMIN — OXYCODONE HYDROCHLORIDE 10 MILLIGRAM(S): 5 TABLET ORAL at 04:11

## 2022-06-07 RX ADMIN — Medication 30 MILLIGRAM(S): at 06:13

## 2022-06-07 RX ADMIN — Medication 30 MILLIGRAM(S): at 00:28

## 2022-06-07 RX ADMIN — Medication 975 MILLIGRAM(S): at 20:29

## 2022-06-07 RX ADMIN — Medication 975 MILLIGRAM(S): at 15:06

## 2022-06-07 RX ADMIN — OXYCODONE HYDROCHLORIDE 10 MILLIGRAM(S): 5 TABLET ORAL at 03:41

## 2022-06-07 RX ADMIN — OXYCODONE HYDROCHLORIDE 10 MILLIGRAM(S): 5 TABLET ORAL at 21:01

## 2022-06-07 RX ADMIN — OXYCODONE HYDROCHLORIDE 10 MILLIGRAM(S): 5 TABLET ORAL at 12:17

## 2022-06-07 RX ADMIN — Medication 975 MILLIGRAM(S): at 05:43

## 2022-06-07 RX ADMIN — OXYCODONE HYDROCHLORIDE 10 MILLIGRAM(S): 5 TABLET ORAL at 19:06

## 2022-06-07 RX ADMIN — POLYETHYLENE GLYCOL 3350 17 GRAM(S): 17 POWDER, FOR SOLUTION ORAL at 12:18
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
## 2022-06-07 NOTE — DISCHARGE NOTE NURSING/CASE MANAGEMENT/SOCIAL WORK - NSDCPEFALRISK_GEN_ALL_CORE
For information on Fall & Injury Prevention, visit: [email or web address] OR  [email or web address] OR  https://www.cdc.gov/steadi/patient.html
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
## 2022-06-07 NOTE — DISCHARGE NOTE PROVIDER - NSDCMRMEDTOKEN_GEN_ALL_CORE_FT
AFO Brace: AFO Brace  To be worn at all times  ICD10: R26.2   Dx: Inability to ambulate  Cipro 500 mg oral tablet: 1 tab(s) orally every 12 hours  docusate sodium 100 mg oral capsule: 1 cap(s) orally 3 times a day  ferrous sulfate: 325 milligram(s) orally 3 times a day (with meals)  finasteride 5 mg oral tablet: 1 tab(s) orally once a day  folic acid 1 mg oral tablet: 1 tab(s) orally once a day  Percocet 5/325 oral tablet: 1 tab(s) orally every 4 hours, As needed, Mild Pain  tamsulosin 0.4 mg oral capsule: 1 cap(s) orally once a day   acetaminophen 325 mg oral tablet: Take 3 tabs oral every 8 hours x 5 days, then as needed for mild pain, temp &gt;100.4F   AFO Brace: AFO Brace  To be worn at all times  ICD10: R26.2   Dx: Inability to ambulate  apixaban 5 mg oral tablet: 2 tab(s) orally every 12 hours until 6/11/22, then on 6/12 take 1 tab PO q 12 hours.  docusate sodium 100 mg oral capsule: 1 cap(s) orally 3 times a day  ferrous sulfate: 325 milligram(s) orally 3 times a day (with meals)  finasteride 5 mg oral tablet: 1 tab(s) orally once a day  folic acid 1 mg oral tablet: 1 tab(s) orally once a day  oxyCODONE 10 mg oral tablet: 1 tab(s) orally every 3 hours, As needed, Severe Pain (7 - 10)  oxyCODONE 5 mg oral tablet: 1 tab(s) orally every 3 hours, As needed, Moderate Pain (4 - 6)  polyethylene glycol 3350 oral powder for reconstitution: 17 gram(s) orally once a day  senna oral tablet: 2 tab(s) orally once a day (at bedtime)  tamsulosin 0.4 mg oral capsule: 1 cap(s) orally once a day  traMADol 50 mg oral tablet: 1 tab(s) orally every 6 hours, As needed, Mild Pain (1 - 3)   acetaminophen 325 mg oral tablet: Take 3 tabs oral every 8 hours x 5 days, then as needed for mild pain, temp &gt;100.4F   AFO Brace: AFO Brace  To be worn at all times  ICD10: R26.2   Dx: Inability to ambulate  apixaban 5 mg oral tablet: 2 tab(s) orally every 12 hours until 6/11/22, then on 6/12 take 1 tab PO q 12 hours.  docusate sodium 100 mg oral capsule: 1 cap(s) orally 3 times a day  ferrous sulfate: 325 milligram(s) orally 3 times a day (with meals)  finasteride 5 mg oral tablet: 1 tab(s) orally once a day  folic acid 1 mg oral tablet: 1 tab(s) orally once a day  morphine 15 mg/8 to 12 hr oral tablet, extended release: 1 tab(s) orally every 8 hours  oxyCODONE 10 mg oral tablet: 1 tab(s) orally every 3 hours, As needed, Severe Pain (7 - 10)  oxyCODONE 5 mg oral tablet: 1 tab(s) orally every 3 hours, As needed, Moderate Pain (4 - 6)  pantoprazole 40 mg oral delayed release tablet: 1 tab(s) orally once a day (before a meal)  polyethylene glycol 3350 oral powder for reconstitution: 17 gram(s) orally once a day  senna oral tablet: 2 tab(s) orally once a day (at bedtime)  tamsulosin 0.4 mg oral capsule: 1 cap(s) orally once a day  traMADol 50 mg oral tablet: 1 tab(s) orally every 6 hours, As needed, Mild Pain (1 - 3)

## 2022-06-07 NOTE — DISCHARGE NOTE NURSING/CASE MANAGEMENT/SOCIAL WORK - PATIENT PORTAL LINK FT
You can access the FollowMyHealth Patient Portal offered by [facility] by registering at the following website: [email or web address]. By joining [employer]’s FollowMyHealth portal, you will also be able to view your health information using other applications (apps) compatible with our system.

## 2022-06-07 NOTE — DISCHARGE NOTE PROVIDER - NSDCCPTREATMENT_GEN_ALL_CORE_FT
PRINCIPAL PROCEDURE  Procedure: Open treatment, fracture, tibia  Findings and Treatment:       SECONDARY PROCEDURE  Procedure: Remov ext immobilization  Findings and Treatment:     Procedure: External fixation of left tibial plateau  Findings and Treatment:

## 2022-06-07 NOTE — DISCHARGE NOTE PROVIDER - DISCHARGE DATE
Pt c/o b/l dental pain, w jaw pain. X few months, off and on, worse last few days. Taking motrin/tyl, mild relief only. Worse at night, grinds teeth per pt, jaw pain also. No swelling. No fever. No diff swallowing. No fever, no nausea. Has dental appt in one month. No rash or other complaints. History reviewed. No pertinent past medical history. History reviewed. No pertinent surgical history. History reviewed. No pertinent family history. Social History     Socioeconomic History    Marital status: SINGLE     Spouse name: Not on file    Number of children: Not on file    Years of education: Not on file    Highest education level: Not on file   Occupational History    Not on file   Social Needs    Financial resource strain: Not on file    Food insecurity:     Worry: Not on file     Inability: Not on file    Transportation needs:     Medical: Not on file     Non-medical: Not on file   Tobacco Use    Smoking status: Never Smoker    Smokeless tobacco: Never Used   Substance and Sexual Activity    Alcohol use: Never     Frequency: Never    Drug use: Never    Sexual activity: Not on file   Lifestyle    Physical activity:     Days per week: Not on file     Minutes per session: Not on file    Stress: Not on file   Relationships    Social connections:     Talks on phone: Not on file     Gets together: Not on file     Attends Protestant service: Not on file     Active member of club or organization: Not on file     Attends meetings of clubs or organizations: Not on file     Relationship status: Not on file    Intimate partner violence:     Fear of current or ex partner: Not on file     Emotionally abused: Not on file     Physically abused: Not on file     Forced sexual activity: Not on file   Other Topics Concern    Not on file   Social History Narrative    Not on file         ALLERGIES: Iodinated contrast media    Review of Systems   Constitutional: Negative for fever. Respiratory: Negative for cough and shortness of breath. Gastrointestinal: Negative for nausea. Neurological: Negative for weakness. All other systems reviewed and are negative. Vitals:    01/29/20 0349   BP: 139/83   Pulse: 61   Resp: 17   Temp: 98.6 °F (37 °C)   SpO2: 99%   Weight: 89.8 kg (198 lb)   Height: 5' 10\" (1.778 m)            Physical Exam  Vitals signs and nursing note reviewed. Constitutional:       Appearance: He is well-developed. HENT:      Head: Normocephalic and atraumatic. Comments: + b/l tmj ttp, no swelling, from  + mult area of dental ttp, dolores around tooth 2,15,17.  w mild gingival swelling sup to tooth 15. No induration/fluctuance. Eyes:      Conjunctiva/sclera: Conjunctivae normal.   Neck:      Musculoskeletal: Normal range of motion. Pulmonary:      Effort: Pulmonary effort is normal.      Breath sounds: No wheezing. Musculoskeletal:         General: No tenderness. Skin:     General: Skin is warm and dry. Capillary Refill: Capillary refill takes less than 2 seconds. Findings: No rash. Neurological:      Mental Status: He is alert and oriented to person, place, and time. MDM       Procedures    Vitals:  Patient Vitals for the past 12 hrs:   Temp Pulse Resp BP SpO2   01/29/20 0349 98.6 °F (37 °C) 61 17 139/83 99 %         Medications ordered:   Medications   traMADol (ULTRAM) tablet 50 mg (50 mg Oral Given 1/29/20 0438)   penicillin v potassium (VEETID) tablet 500 mg (500 mg Oral Given 1/29/20 0438)         Lab findings:  No results found for this or any previous visit (from the past 12 hour(s)). X-Ray, CT or other radiology findings or impressions:  No orders to display       Progress notes, Consult notes or additional Procedure notes:   C/w mild dental carries w tmj syndrome. No emc. Stable for dc and close f/u. Det ret inst given. No s/o abscess/bacteremia/sepsis. Diagnosis:   1. Dental caries    2.  TMJ syndrome Disposition: home    Follow-up Information     Follow up With Specialties Details Why [address]  Schedule an appointment as soon as possible for a visit in 2 days  [phone]  [facility] Emergency Medicine Go to As needed, If symptoms worsen [address] [place] [id number]  [address] [place] [address] Schedule an appointment as soon as possible for a visit in 2 days  [address] [place] [address]  [phone]           Discharge Medication List as of 1/29/2020  4:28 AM      START taking these medications    Details   traMADol (ULTRAM) 50 mg tablet Take 1 Tab by mouth every six (6) hours as needed for Pain for up to 7 days. Max Daily Amount: 200 mg., Print, Disp-15 Tab, R-0      ibuprofen (MOTRIN) 600 mg tablet Take 1 Tab by mouth every six (6) hours as needed for Pain., Print, Disp-18 Tab, R-0      penicillin v potassium (VEETID) 500 mg tablet Take 1 Tab by mouth three (3) times daily for 7 days. , Print, Disp-21 Tab, R-0         CONTINUE these medications which have NOT CHANGED    Details   dextromethorphan-guaiFENesin (ROBITUSSIN-DM)  mg/5 mL syrup Take 10 mL by mouth every six (6) hours as needed for Cough. , Print, Disp-240 mL, R-0 07-Jun-2022 08-Jun-2022

## 2022-06-07 NOTE — H&P ADULT - NSHPLABSRESULTS_GEN_ALL_CORE
RECENT LABS                        9.1    8.08  )-----------( 463      ( 07 Jun 2022 10:43 )             27.5     06-07    137  |  99  |  19  ----------------------------<  178<H>  4.2   |  27  |  0.96    Ca    9.5      07 Jun 2022 10:43      IMAGING    < from: CT Tibia/Fibia No Cont, Left (05.29.22 @ 12:35) >    IMPRESSION:    LEFT TIBIA AND FIBULA:    Comminuted displaced fracture of the proximal tibial metaphysis and diaphysis. Numerous foci of air are seen within the surrounding soft tissues and within the tibial fracture consistent with an open component   to the fracture. Subjacent to the wound there are numerous radiopacities which may be related to pulverized bone fragments or be secondary to small foreign bodies. There is no evidence of articular extension to the tibial plateau or into the distal diaphysis. Fracture involves the tibial tubercle and the patellar tendon origin. No foci of intra-articular air are seen within the knee joint.    Additional displaced fracture of proximal fibular diaphysis. Ill-defined hematoma within the posterior medial subcutaneous fat extending from the knee joint line to the level the proximal tibia. Ill-defined hematoma is also seen over the medial lateral patellar retinaculum.    Small essentially nondisplaced cortical avulsion along the inferolateral margin of the patella.    RIGHT TIBIA AND FIBULA:    No evidence of acute fracture or dislocation. Well-corticated ossicle along the anteromedial aspect of the tibial plateau likely related to the sequela of remote injury. Prepatellar soft tissue edema and edema along   the medial retinaculum. Findings may be related to underlying soft tissue contusion and retinacular sprain.    Soft tissue wound along the anteromedial soft tissues at the level of the tibial metaphysis with foci of air tracking into the adjacent subcutaneous fat. Punctate foci of radiopacity are seen subjacent to the wound consistent with small foreign bodies. Additional scattered dermal   radiopacities are seen circumferentially about the lower leg likely related to debris.    Findings suggestive of ganglion cysts along the posterior margin of the popliteus tendon posterior lateral aspect of the knee and at the level of the extensor digitorum brevis muscle at the midfoot.    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    Again seen is severe left-sided hydronephrosis which is mildly improved but remains severe. There is hyperdense material within the dilated collecting system but overall the amount of hemorrhage has decreased when   compared with the previous study. The mid and distal left ureter is dilated. Again noted are 2 left ureteral stents.    There is a Zeng catheter with balloon noted within the prostatic urethra. Nurse Wilber Grande is aware.     The bladder wall is thickened. There is a small amount of air within the wall the bladder superiorly. This is may be related to previously placed suprapubic catheter. Please correlate.    < end of copied text >    < from: VA Duplex Lower Ext Vein Scan, Right (06.05.22 @ 10:33) >    IMPRESSION:  Acute deep venous thrombosis: below the knee at the level of the RIGHT posterior tibial vein.    < end of copied text >

## 2022-06-07 NOTE — DISCHARGE NOTE PROVIDER - CARE PROVIDER_API CALL
Thompson Low)  Orthopaedic Surgery  [address] [facility] [address]  [place] [address]  Phone: [phone]  Fax: [phone]  Follow Up Time:
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
## 2022-06-08 ENCOUNTER — INPATIENT (INPATIENT)
Facility: HOSPITAL | Age: 57
LOS: 9 days | Discharge: HOME HEALTH | DRG: 949 | End: 2022-06-18
Attending: INTERNAL MEDICINE | Admitting: INTERNAL MEDICINE
Payer: COMMERCIAL

## 2022-06-08 VITALS
OXYGEN SATURATION: 98 % | HEIGHT: 71 IN | SYSTOLIC BLOOD PRESSURE: 138 MMHG | DIASTOLIC BLOOD PRESSURE: 84 MMHG | TEMPERATURE: 98 F | HEART RATE: 95 BPM | RESPIRATION RATE: 16 BRPM | WEIGHT: 196.21 LBS

## 2022-06-08 VITALS
TEMPERATURE: 98 F | HEART RATE: 92 BPM | OXYGEN SATURATION: 95 % | SYSTOLIC BLOOD PRESSURE: 128 MMHG | DIASTOLIC BLOOD PRESSURE: 82 MMHG | RESPIRATION RATE: 18 BRPM

## 2022-06-08 DIAGNOSIS — S82.145A NONDISPLACED BICONDYLAR FRACTURE OF LEFT TIBIA, INITIAL ENCOUNTER FOR CLOSED FRACTURE: ICD-10-CM

## 2022-06-08 DIAGNOSIS — N13.5 CROSSING VESSEL AND STRICTURE OF URETER WITHOUT HYDRONEPHROSIS: Chronic | ICD-10-CM

## 2022-06-08 DIAGNOSIS — H57.8 OTHER SPECIFIED DISORDERS OF EYE AND ADNEXA: Chronic | ICD-10-CM

## 2022-06-08 DIAGNOSIS — Z98.89 OTHER SPECIFIED POSTPROCEDURAL STATES: Chronic | ICD-10-CM

## 2022-06-08 DIAGNOSIS — S82.141A DISPLACED BICONDYLAR FRACTURE OF RIGHT TIBIA, INITIAL ENCOUNTER FOR CLOSED FRACTURE: ICD-10-CM

## 2022-06-08 PROCEDURE — 99223 1ST HOSP IP/OBS HIGH 75: CPT

## 2022-06-08 RX ORDER — OXYCODONE HYDROCHLORIDE 5 MG/1
10 TABLET ORAL EVERY 6 HOURS
Refills: 0 | Status: DISCONTINUED | OUTPATIENT
Start: 2022-06-08 | End: 2022-06-14

## 2022-06-08 RX ORDER — INFLUENZA VIRUS VACCINE 15; 15; 15; 15 UG/.5ML; UG/.5ML; UG/.5ML; UG/.5ML
0.5 SUSPENSION INTRAMUSCULAR ONCE
Refills: 0 | Status: COMPLETED | OUTPATIENT
Start: 2022-06-08 | End: 2022-06-08

## 2022-06-08 RX ORDER — MORPHINE SULFATE 50 MG/1
15 CAPSULE, EXTENDED RELEASE ORAL EVERY 8 HOURS
Refills: 0 | Status: DISCONTINUED | OUTPATIENT
Start: 2022-06-08 | End: 2022-06-08

## 2022-06-08 RX ORDER — ACETAMINOPHEN 500 MG
975 TABLET ORAL EVERY 8 HOURS
Refills: 0 | Status: DISCONTINUED | OUTPATIENT
Start: 2022-06-08 | End: 2022-06-09

## 2022-06-08 RX ORDER — APIXABAN 2.5 MG/1
10 TABLET, FILM COATED ORAL EVERY 12 HOURS
Refills: 0 | Status: DISCONTINUED | OUTPATIENT
Start: 2022-06-08 | End: 2022-06-08

## 2022-06-08 RX ORDER — PANTOPRAZOLE SODIUM 20 MG/1
40 TABLET, DELAYED RELEASE ORAL
Refills: 0 | Status: DISCONTINUED | OUTPATIENT
Start: 2022-06-08 | End: 2022-06-18

## 2022-06-08 RX ORDER — PANTOPRAZOLE SODIUM 20 MG/1
40 TABLET, DELAYED RELEASE ORAL
Refills: 0 | Status: DISCONTINUED | OUTPATIENT
Start: 2022-06-08 | End: 2022-06-08

## 2022-06-08 RX ORDER — MORPHINE SULFATE 50 MG/1
15 CAPSULE, EXTENDED RELEASE ORAL EVERY 8 HOURS
Refills: 0 | Status: DISCONTINUED | OUTPATIENT
Start: 2022-06-08 | End: 2022-06-15

## 2022-06-08 RX ORDER — MORPHINE SULFATE 50 MG/1
15 CAPSULE, EXTENDED RELEASE ORAL ONCE
Refills: 0 | Status: DISCONTINUED | OUTPATIENT
Start: 2022-06-08 | End: 2022-06-08

## 2022-06-08 RX ORDER — OXYCODONE HYDROCHLORIDE 5 MG/1
5 TABLET ORAL EVERY 6 HOURS
Refills: 0 | Status: DISCONTINUED | OUTPATIENT
Start: 2022-06-08 | End: 2022-06-14

## 2022-06-08 RX ORDER — MORPHINE SULFATE 50 MG/1
1 CAPSULE, EXTENDED RELEASE ORAL
Qty: 0 | Refills: 0 | DISCHARGE
Start: 2022-06-08

## 2022-06-08 RX ORDER — SENNA PLUS 8.6 MG/1
2 TABLET ORAL AT BEDTIME
Refills: 0 | Status: DISCONTINUED | OUTPATIENT
Start: 2022-06-08 | End: 2022-06-18

## 2022-06-08 RX ORDER — PANTOPRAZOLE SODIUM 20 MG/1
1 TABLET, DELAYED RELEASE ORAL
Qty: 0 | Refills: 0 | DISCHARGE
Start: 2022-06-08

## 2022-06-08 RX ORDER — POLYETHYLENE GLYCOL 3350 17 G/17G
17 POWDER, FOR SOLUTION ORAL DAILY
Refills: 0 | Status: DISCONTINUED | OUTPATIENT
Start: 2022-06-08 | End: 2022-06-18

## 2022-06-08 RX ORDER — APIXABAN 2.5 MG/1
10 TABLET, FILM COATED ORAL EVERY 12 HOURS
Refills: 0 | Status: DISCONTINUED | OUTPATIENT
Start: 2022-06-08 | End: 2022-06-11

## 2022-06-08 RX ADMIN — Medication 975 MILLIGRAM(S): at 23:09

## 2022-06-08 RX ADMIN — OXYCODONE HYDROCHLORIDE 10 MILLIGRAM(S): 5 TABLET ORAL at 13:31

## 2022-06-08 RX ADMIN — OXYCODONE HYDROCHLORIDE 10 MILLIGRAM(S): 5 TABLET ORAL at 21:23

## 2022-06-08 RX ADMIN — OXYCODONE HYDROCHLORIDE 10 MILLIGRAM(S): 5 TABLET ORAL at 01:01

## 2022-06-08 RX ADMIN — OXYCODONE HYDROCHLORIDE 10 MILLIGRAM(S): 5 TABLET ORAL at 08:18

## 2022-06-08 RX ADMIN — OXYCODONE HYDROCHLORIDE 10 MILLIGRAM(S): 5 TABLET ORAL at 11:14

## 2022-06-08 RX ADMIN — OXYCODONE HYDROCHLORIDE 10 MILLIGRAM(S): 5 TABLET ORAL at 08:55

## 2022-06-08 RX ADMIN — Medication 975 MILLIGRAM(S): at 14:35

## 2022-06-08 RX ADMIN — Medication 975 MILLIGRAM(S): at 15:00

## 2022-06-08 RX ADMIN — SENNA PLUS 2 TABLET(S): 8.6 TABLET ORAL at 22:22

## 2022-06-08 RX ADMIN — Medication 975 MILLIGRAM(S): at 05:29

## 2022-06-08 RX ADMIN — MORPHINE SULFATE 15 MILLIGRAM(S): 50 CAPSULE, EXTENDED RELEASE ORAL at 23:10

## 2022-06-08 RX ADMIN — APIXABAN 10 MILLIGRAM(S): 2.5 TABLET, FILM COATED ORAL at 17:32

## 2022-06-08 RX ADMIN — MORPHINE SULFATE 15 MILLIGRAM(S): 50 CAPSULE, EXTENDED RELEASE ORAL at 22:22

## 2022-06-08 RX ADMIN — OXYCODONE HYDROCHLORIDE 10 MILLIGRAM(S): 5 TABLET ORAL at 03:22

## 2022-06-08 RX ADMIN — Medication 975 MILLIGRAM(S): at 22:22

## 2022-06-08 RX ADMIN — OXYCODONE HYDROCHLORIDE 10 MILLIGRAM(S): 5 TABLET ORAL at 00:31

## 2022-06-08 RX ADMIN — MORPHINE SULFATE 15 MILLIGRAM(S): 50 CAPSULE, EXTENDED RELEASE ORAL at 15:00

## 2022-06-08 RX ADMIN — OXYCODONE HYDROCHLORIDE 10 MILLIGRAM(S): 5 TABLET ORAL at 14:30

## 2022-06-08 RX ADMIN — MORPHINE SULFATE 15 MILLIGRAM(S): 50 CAPSULE, EXTENDED RELEASE ORAL at 10:23

## 2022-06-08 RX ADMIN — OXYCODONE HYDROCHLORIDE 10 MILLIGRAM(S): 5 TABLET ORAL at 22:23

## 2022-06-08 RX ADMIN — APIXABAN 10 MILLIGRAM(S): 2.5 TABLET, FILM COATED ORAL at 05:30

## 2022-06-08 RX ADMIN — MORPHINE SULFATE 15 MILLIGRAM(S): 50 CAPSULE, EXTENDED RELEASE ORAL at 14:57

## 2022-06-08 NOTE — PROGRESS NOTE ADULT - PROBLEM SELECTOR PLAN 3
continue to follow for signs of urinary retention  flomax as needed.

## 2022-06-08 NOTE — PROGRESS NOTE ADULT - REASON FOR ADMISSION
bilateral tibia injuries  Left open tibia  right shin wound
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
## 2022-06-08 NOTE — H&P ADULT - NSHPPHYSICALEXAM_GEN_ALL_CORE
Constitutional - NAD, Comfortable  HEENT - NCAT, EOMI  Neck - Supple, No limited ROM  Chest - good chest expansion, good respiratory effort, CTAB   Cardio - warm and well perfused, RRR, no murmur  Abdomen -  Soft, NTND  Extremities - No peripheral edema, No calf tenderness   Neurologic Exam:                    Cognitive -             Orientation: Awake, Alert, AAO to self, place, date, year, situation            Attention:  Days of week, recall 3 objects without cuing            Memory: Recent/ remote memory intact            Thought: process, content appropriate     Speech - Fluent, Comprehensible, No dysarthria, No aphasia      Cranial Nerves - No facial asymmetry, Tongue midline, EOMI, Shoulder shrug intact     Motor -                      LEFT    UE - ShAB 5/5, EF 5/5, EE 5/5, WE 5/5,  WNL                    RIGHT UE - ShAB 5/5, EF 5/5, EE 5/5, WE 5/5,  WNL                    LEFT    LE - HF 5/5, KE 5/5, DF 5/5, PF 5/5                    RIGHT LE - HF 5/5, KE 5/5, DF 5/5, PF 5/5        Sensory - Intact to LT bilateral     Reflexes - DTR _ / 4 , neg Peñaloza's b/l, neg Babinski's b/l     Coordination - FTN / HTS intact     OculoVestibular -  No nystagmus  Psychiatric - Mood stable, Affect WNL  Skin on admission:

## 2022-06-08 NOTE — PROGRESS NOTE ADULT - PROBLEM SELECTOR PLAN 4
Patient found to have a right tibial DVT  currently on AC post op
Patient found to have a right tibial DVT  currently on AC post op
Patient found to have a right tibial DVT  currently on AC post op  could continue to monitor with serial dopplers
Patient found to have a right tibial DVT  currently on AC post op

## 2022-06-08 NOTE — PROGRESS NOTE ADULT - PROBLEM SELECTOR PLAN 1
Patient tolerated surgery well  PO as tolerated  continue wound care  DVT and GI prophylaxis.
Patient tolerated surgery well  PO as tolerated  continue wound care  DVT and GI prophylaxis.
Dispensed AFO and surgical shoe.  Pt to use as directed by .  Please call [facility] with any questions, [phone].
Patient currently in exfix  Pin care as per ortho  scheduled for further surgery 6/2  No contraindication to scheduled procedure  PO as tolerated  continue wound care  DVT and GI prophylaxis.
Patient currently in exfix  Pin care as per ortho  scheduled for further surgery 6/4  No contraindication to scheduled procedure  PO as tolerated  continue wound care  DVT and GI prophylaxis.
Patient tolerated surgery well  PO as tolerated  continue wound care  DVT and GI prophylaxis.
Dispensed AFO and surgical shoe.  Pt to use as directed by .  Please call [facility] with any questions, [phone].
Patient currently in exfix  Pin care as per ortho  scheduled for further surgery 6/2  PO as tolerated  continue wound care  DVT and GI prophylaxis.
Patient tolerated surgery well  PO as tolerated  continue wound care  DVT and GI prophylaxis.
Patient tolerated surgery well  PO as tolerated  continue wound care  DVT and GI prophylaxis.
Patient currently in exfix  keep leg elevated and iced  scheduled for further surgery 6/4  No contraindication to scheduled procedure  NPO after MN  continue wound care  DVT and GI prophylaxis.

## 2022-06-08 NOTE — PROGRESS NOTE ADULT - PROVIDER SPECIALTY LIST ADULT
Orthopedics
Internal Medicine
Orthopedics
Orthopedics
Surgery
Internal Medicine
Orthopedics
Internal Medicine
Orthopedics
Internal Medicine
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
## 2022-06-08 NOTE — PROGRESS NOTE ADULT - PROBLEM SELECTOR PROBLEM 1
Left tibial fracture

## 2022-06-08 NOTE — PROGRESS NOTE ADULT - TIME BILLING
Discussed treatment plan with patient at bedside.
Discussed treatment plan with patient at bedside.
Scheduled for DC to rehab  continue current meds  PO as tolerated  activity as tolerated  follow up with ortho  Patient aware of plan
Discussed treatment plan with patient at bedside.
Scheduled for DC to rehab  continue current meds  PO as tolerated  activity as tolerated  follow up with ortho  Patient aware of plan
Discussed treatment plan with patient at bedside.
Discussed treatment plan with patient at bedside.
Discussed treatment plan with patient and wife at bedside.
Discussed treatment plan with patient at bedside.
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
## 2022-06-08 NOTE — PROGRESS NOTE ADULT - ASSESSMENT
A/P: 57y Male s/p L tibial plateau ORIF.  VSS. NAD.    PT/OT- NWB LLE, no knee flexion  DVT PPx with eliquis  Followup AM labs  Pain Control    Kassy Payton PA-C  Team Pager: [phone]
56 yo male presents with an open left tibial fracture and a wound to the RLE. Patient currently in exfix and is scheduled for further surgery 6/4
58 yo male presents with an open left tibial fracture and a wound to the RLE. Patient currently in exfix and is scheduled for further surgery later in the week 
A/p: 57y Male sp Open Tibial Plateau Fx External Fixation on 5/31.      PT/OT-NWB LLE  Continue to Ice and elevate per routine to decrease LLE swelling  F/U pre op labs  NPO/IVF a MN  IS  DVT PPx: Lovenox 40mg SQ Daily   Pain Control  Plan for external fixator removal and L Knee ORIF for 6/4/22 with Dr. Low.
A/p: 57y Male sp Open Tibial Plateau Fx External Fixation on 5/31.    PT/OT-NWB LLE  Continue to Ice and elevate per routine to decrease LLE swelling  F/U AM Labs   IS  DVT PPx: Lovenox 40mg SQ Daily   Pain Control  Plan for external fixator removal and L Knee ORIF for 6/4/22 with Dr. Low.    [place]opa[place]  [facility] [id number]  [facility]        [id number]  [facility]  [id number]/ [phone]  
Pt. requires AFO to LT
A/p: 57y Male POD#1 L Open Tibial Plateau Fx External Fixation.  VSS. NAD.    PT/OT-NWB LLE  Continue to Ice and elevate per routine to decrease LLE swelling  F/U AM Labs tomorrow  IS  DVT PPx: Lovenox 40mg SQ Daily   Pain Control  Continue Current Tx.  Plan for external fixator removal and L Knee ORIF for 6/4/22 with Dr. Low.    Kenneth Bacon PA-C  Orthopedic Surgery Team  Team Pager: [phone]
    A/P: 57y Male s/p L tibial plateau fx with ex fix placement/I&D    PT/OT- NWB LLE  Lovenox held  Pain Control  OR today with Dr. Low for definitive fixation    Kassy Payton PA-C  Team Pager: #[phone]  
58 yo male presents with an open left tibial fracture and a wound to the RLE. Patient is s/p Open reduction and internal fixation of left tibia
A/p: 57y Male sp Open Tibial Plateau Fx External Fixation on 5/31.    PT/OT-NWB LLE  Continue to Ice and elevate per routine to decrease LLE swelling  F/U AM Labs   IS  DVT PPx: Lovenox 40mg SQ Daily   Pain Control  Plan for external fixator removal and L Knee ORIF for 6/4/22 with Dr. Low.
    A/P: 57y Male s/p L tibial plateau ORIF.  VSS. NAD.    PT/OT- NWB LLE, no knee flexion  DVT PPx with eliquis  Followup AM labs  Pain Control      
56 yo male presents with an open left tibial fracture and a wound to the RLE. Patient is s/p Open reduction and internal fixation of left tibia
58 yo male presents with an open left tibial fracture and a wound to the RLE. Patient is s/p Open reduction and internal fixation of left tibia
A/P: 57y Male s/p L tibial plateau ORIF.  VSS. NAD.    PT/OT- NWB LLE, no knee flexion  Dopplers w/ R posterior tibial DVT on Therapeutic Eliquis  Pain Control  Dispo: [place]
58 yo male presents with an open left tibial fracture and a wound to the RLE. Patient currently in exfix and is scheduled for further surgery later in the week 
56 yo male presents with an open left tibial fracture and a wound to the RLE. Patient is s/p Open reduction and internal fixation of left tibia
56 yo male presents with an open left tibial fracture and a wound to the RLE. Patient currently in exfix and is scheduled for further surgery later in the week 
56 yo male presents with an open left tibial fracture and a wound to the RLE. Patient is s/p Open reduction and internal fixation of left tibia

## 2022-06-08 NOTE — H&P ADULT - NSHPSOCIALHISTORY_GEN_ALL_CORE
SOCIAL HISTORY  Smoking - Denied  EtOH - Denied   Drugs - Denied    FUNCTIONAL HISTORY  Pt lives in a house with his wife and daughter, no steps to enter, 1 flight of stairs, PTA     Works as     CURRENT FUNCTIONAL STATUS  - Bed Mobility: Derrick  - Transfers: Derrick  - Gait: Derrick 30' RW  - ADLs:
SOCIAL HISTORY  Smoking - Denied  EtOH - Denied   Drugs - Denied    FUNCTIONAL HISTORY  Pt lives in a house with his wife and daughter, no steps to enter, 1 flight of stairs, PTA     Works as     CURRENT FUNCTIONAL STATUS  - Bed Mobility: Derrick  - Transfers: Derrick  - Gait: Derrick 30' RW  - ADLs:
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
## 2022-06-08 NOTE — PROGRESS NOTE ADULT - PROBLEM SELECTOR PLAN 2
Pain meds as needed  follow for oversedation  GI regimen to prevent constipation.
Pain meds as needed  follow for oversedation  GI regimen to prevent constipation.
Pain meds as needed  follow for oversedation  GI regimen to prevent constipation.  Pain has been managed
Pain meds as needed  follow for oversedation  GI regimen to prevent constipation.
Pain meds as needed  follow for oversedation  GI regimen to prevent constipation.  Pain has been managed
Pain meds as needed  follow for oversedation  GI regimen to prevent constipation.  Pain has been managed
Pain meds as needed  follow for oversedation  GI regimen to prevent constipation.
Pain meds as needed  follow for oversedation  GI regimen to prevent constipation.  Pain has been managed
Pain meds as needed  follow for oversedation  May need longer acting pain relief   GI regimen to prevent constipation.

## 2022-06-08 NOTE — PATIENT PROFILE ADULT - FALL HARM RISK - HARM RISK INTERVENTIONS
Assistance with ambulation/Assistance OOB with selected safe patient handling equipment/Communicate Risk of Fall with Harm to all staff/Discuss with provider need for PT consult/Monitor gait and stability/Reinforce activity limits and safety measures with patient and family/Sit up slowly, dangle for a short time, stand at bedside before walking/Tailored Fall Risk Interventions/Use of alarms - bed, chair and/or voice tab/Visual Cue: Yellow wristband and red socks/Bed in lowest position, wheels locked, appropriate side rails in place/Call bell, personal items and telephone in reach/Instruct patient to call for assistance before getting out of bed or chair/Non-slip footwear when patient is out of bed/[place] to call system/Physically safe environment - no spills, clutter or unnecessary equipment/Purposeful Proactive Rounding/Room/bathroom lighting operational, light cord in reach

## 2022-06-08 NOTE — H&P ADULT - NSICDXPASTSURGICALHX_GEN_ALL_CORE_FT
PAST SURGICAL HISTORY:  Cyst of eye Excision    S/P laparoscopic procedure s/p pyeloplasty- left, suprapubic catheter insertion & left stent insertion march 2014    UPJ (ureteropelvic junction) obstruction s/p nephrostomy, feb 2014    
PAST SURGICAL HISTORY:  Cyst of eye Excision    S/P laparoscopic procedure s/p pyeloplasty- left, suprapubic catheter insertion & left stent insertion march 2014    UPJ (ureteropelvic junction) obstruction s/p nephrostomy, feb 2014
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
## 2022-06-08 NOTE — PROGRESS NOTE ADULT - PROBLEM SELECTOR PROBLEM 3
BPH without urinary obstruction

## 2022-06-08 NOTE — H&P ADULT - NSICDXPASTMEDICALHX_GEN_ALL_CORE_FT
PAST MEDICAL HISTORY:  BPH (benign prostatic hyperplasia)     UPJ (ureteropelvic junction) obstruction     
PAST MEDICAL HISTORY:  BPH (benign prostatic hyperplasia)     UPJ (ureteropelvic junction) obstruction

## 2022-06-09 LAB
ALBUMIN SERPL ELPH-MCNC: 2.5 G/DL — LOW (ref 3.3–5)
ALP SERPL-CCNC: 400 U/L — HIGH (ref 40–120)
ALT FLD-CCNC: 242 U/L — HIGH (ref 10–45)
ANION GAP SERPL CALC-SCNC: 9 MMOL/L — SIGNIFICANT CHANGE UP (ref 5–17)
AST SERPL-CCNC: 109 U/L — HIGH (ref 10–40)
BASOPHILS # BLD AUTO: 0.06 K/UL — SIGNIFICANT CHANGE UP (ref 0–0.2)
BASOPHILS NFR BLD AUTO: 0.7 % — SIGNIFICANT CHANGE UP (ref 0–2)
BILIRUB SERPL-MCNC: 0.6 MG/DL — SIGNIFICANT CHANGE UP (ref 0.2–1.2)
BUN SERPL-MCNC: 21 MG/DL — SIGNIFICANT CHANGE UP (ref 7–23)
CALCIUM SERPL-MCNC: 9.2 MG/DL — SIGNIFICANT CHANGE UP (ref 8.4–10.5)
CHLORIDE SERPL-SCNC: 101 MMOL/L — SIGNIFICANT CHANGE UP (ref 96–108)
CO2 SERPL-SCNC: 28 MMOL/L — SIGNIFICANT CHANGE UP (ref 22–31)
CREAT SERPL-MCNC: 0.93 MG/DL — SIGNIFICANT CHANGE UP (ref 0.5–1.3)
EGFR: 95 ML/MIN/1.73M2 — SIGNIFICANT CHANGE UP
EOSINOPHIL # BLD AUTO: 0.36 K/UL — SIGNIFICANT CHANGE UP (ref 0–0.5)
EOSINOPHIL NFR BLD AUTO: 3.9 % — SIGNIFICANT CHANGE UP (ref 0–6)
GLUCOSE SERPL-MCNC: 106 MG/DL — HIGH (ref 70–99)
HCT VFR BLD CALC: 26.3 % — LOW (ref 39–50)
HCV AB S/CO SERPL IA: 0.07 S/CO — SIGNIFICANT CHANGE UP (ref 0–0.99)
HCV AB SERPL-IMP: SIGNIFICANT CHANGE UP
HGB BLD-MCNC: 8.9 G/DL — LOW (ref 13–17)
IMM GRANULOCYTES NFR BLD AUTO: 1.4 % — SIGNIFICANT CHANGE UP (ref 0–1.5)
LYMPHOCYTES # BLD AUTO: 2.01 K/UL — SIGNIFICANT CHANGE UP (ref 1–3.3)
LYMPHOCYTES # BLD AUTO: 22 % — SIGNIFICANT CHANGE UP (ref 13–44)
MCHC RBC-ENTMCNC: 31.9 PG — SIGNIFICANT CHANGE UP (ref 27–34)
MCHC RBC-ENTMCNC: 33.8 GM/DL — SIGNIFICANT CHANGE UP (ref 32–36)
MCV RBC AUTO: 94.3 FL — SIGNIFICANT CHANGE UP (ref 80–100)
MONOCYTES # BLD AUTO: 1.06 K/UL — HIGH (ref 0–0.9)
MONOCYTES NFR BLD AUTO: 11.6 % — SIGNIFICANT CHANGE UP (ref 2–14)
NEUTROPHILS # BLD AUTO: 5.51 K/UL — SIGNIFICANT CHANGE UP (ref 1.8–7.4)
NEUTROPHILS NFR BLD AUTO: 60.4 % — SIGNIFICANT CHANGE UP (ref 43–77)
NRBC # BLD: 0 /100 WBCS — SIGNIFICANT CHANGE UP (ref 0–0)
PLATELET # BLD AUTO: 565 K/UL — HIGH (ref 150–400)
POTASSIUM SERPL-MCNC: 4.3 MMOL/L — SIGNIFICANT CHANGE UP (ref 3.5–5.3)
POTASSIUM SERPL-SCNC: 4.3 MMOL/L — SIGNIFICANT CHANGE UP (ref 3.5–5.3)
PROT SERPL-MCNC: 6.6 G/DL — SIGNIFICANT CHANGE UP (ref 6–8.3)
RBC # BLD: 2.79 M/UL — LOW (ref 4.2–5.8)
RBC # FLD: 12.8 % — SIGNIFICANT CHANGE UP (ref 10.3–14.5)
SARS-COV-2 RNA SPEC QL NAA+PROBE: SIGNIFICANT CHANGE UP
SODIUM SERPL-SCNC: 138 MMOL/L — SIGNIFICANT CHANGE UP (ref 135–145)
WBC # BLD: 9.13 K/UL — SIGNIFICANT CHANGE UP (ref 3.8–10.5)
WBC # FLD AUTO: 9.13 K/UL — SIGNIFICANT CHANGE UP (ref 3.8–10.5)

## 2022-06-09 PROCEDURE — 99255 IP/OBS CONSLTJ NEW/EST HI 80: CPT

## 2022-06-09 PROCEDURE — 99233 SBSQ HOSP IP/OBS HIGH 50: CPT

## 2022-06-09 RX ORDER — LACTULOSE 10 G/15ML
10 SOLUTION ORAL DAILY
Refills: 0 | Status: DISCONTINUED | OUTPATIENT
Start: 2022-06-09 | End: 2022-06-18

## 2022-06-09 RX ADMIN — Medication 975 MILLIGRAM(S): at 07:14

## 2022-06-09 RX ADMIN — OXYCODONE HYDROCHLORIDE 10 MILLIGRAM(S): 5 TABLET ORAL at 13:46

## 2022-06-09 RX ADMIN — MORPHINE SULFATE 15 MILLIGRAM(S): 50 CAPSULE, EXTENDED RELEASE ORAL at 13:50

## 2022-06-09 RX ADMIN — APIXABAN 10 MILLIGRAM(S): 2.5 TABLET, FILM COATED ORAL at 17:22

## 2022-06-09 RX ADMIN — OXYCODONE HYDROCHLORIDE 10 MILLIGRAM(S): 5 TABLET ORAL at 18:32

## 2022-06-09 RX ADMIN — MORPHINE SULFATE 15 MILLIGRAM(S): 50 CAPSULE, EXTENDED RELEASE ORAL at 13:53

## 2022-06-09 RX ADMIN — PANTOPRAZOLE SODIUM 40 MILLIGRAM(S): 20 TABLET, DELAYED RELEASE ORAL at 06:06

## 2022-06-09 RX ADMIN — MORPHINE SULFATE 15 MILLIGRAM(S): 50 CAPSULE, EXTENDED RELEASE ORAL at 07:14

## 2022-06-09 RX ADMIN — Medication 975 MILLIGRAM(S): at 06:07

## 2022-06-09 RX ADMIN — MORPHINE SULFATE 15 MILLIGRAM(S): 50 CAPSULE, EXTENDED RELEASE ORAL at 21:45

## 2022-06-09 RX ADMIN — SENNA PLUS 2 TABLET(S): 8.6 TABLET ORAL at 21:45

## 2022-06-09 RX ADMIN — MORPHINE SULFATE 15 MILLIGRAM(S): 50 CAPSULE, EXTENDED RELEASE ORAL at 06:06

## 2022-06-09 RX ADMIN — OXYCODONE HYDROCHLORIDE 10 MILLIGRAM(S): 5 TABLET ORAL at 12:21

## 2022-06-09 RX ADMIN — POLYETHYLENE GLYCOL 3350 17 GRAM(S): 17 POWDER, FOR SOLUTION ORAL at 11:44

## 2022-06-09 RX ADMIN — MORPHINE SULFATE 15 MILLIGRAM(S): 50 CAPSULE, EXTENDED RELEASE ORAL at 22:16

## 2022-06-09 RX ADMIN — APIXABAN 10 MILLIGRAM(S): 2.5 TABLET, FILM COATED ORAL at 06:06

## 2022-06-09 NOTE — CHART NOTE - NSCHARTNOTEFT_GEN_A_CORE
[facility] Inter[facility]plinary Plan of Care    REHABILITATION DIAGNOSIS:  Nondisplaced bicondylar fracture of left tibia, initial encounter for closed fracture    COMORBIDITIES/COMPLICATING CONDITIONS IMPACTING REHABILITATION:  HEALTH ISSUES - PROBLEM Dx:        PAST MEDICAL & SURGICAL HISTORY:  BPH (benign prostatic hyperplasia)      UPJ (ureteropelvic junction) obstruction      Cyst of eye  Excision      S/P laparoscopic procedure  s/p pyeloplasty- left, suprapubic catheter insertion &amp; left stent insertion march 2014      UPJ (ureteropelvic junction) obstruction  s/p nephrostomy, feb 2014          Based upon consideration of the patient's impairments, functional status, complicating conditions and any other contributing factors and after information garnered from the assessments of all therapy disciplines involved in treating the patient and other pertinent clinicians:    INTERDISCIPLINARY REHABILITATION INTERVENTIONS:    [ X  ] Transfer Training  [ X  ] Bed Mobility  [ X  ] Therapeutic Exercise  [ X ] Balance/Coordination Exercises  [ X ] Locomotion retraining  [ X  ] Stairs  [  X ] Functional Transfer Training  [   ] Bowel/Bladder program  [  x ] Pain Management  [   ] Skin/Wound Care  [   ] Visual/Perceptual Training  [   ] Therapeutic Recreation Activities  [   ] Neuromuscular Re-education  [ X  ] Activities of Daily Living  [   ] Speech Exercise  [   ] Swallowing Exercises  [   ] Vital Stim  [   ] Dietary Supplements  [   ] Calorie Count  [   ] Cognitive Exercises  [   ] Congnitive/Linguistic Treatment  [   ] Behavior Program  [   ] Neuropsych Therapy  [ X  ] Patient/Family Counseling  [ X ] Family Training  [ X  ] Community Re-entry  [   ] Orthotic Evaluation  [   ] Prosthetic Eval/Training    MEDICAL PROGNOSIS:  Good     REHAB POTENTIAL:  Good   EXPECTED DAILY THERAPY:         PT: 1.5 hr       OT: 1.5 hr       ST: n/a       P&O: Left knee immobilizer, cam boot    EXPECTED INTENSITY OF PROGRAM:  3 hrs / Day    EXPECTED FREQUENCY OF PROGRAM: 5 Days/ Week    ESTIMATED LOS:  [  ] 5-7 Days  [  ] 7-10 Days  [  ] 10- 14 Days  [ x ] 14- 18 Days  [  ] 18- 21 Days    ESTIMATED DISPOSITION:  [  ] Home   [ x] Home with Outpatient Therapies  [  ] Home with Home Therapies  [  ] Assisted Living  [  ] Nursing Home  [  ] Long Term Acute Care    INTERDISCIPLINARY FUNCTIONAL OUTCOMES/GOALS:         Gait/Mobility: mod assistance with gait device       Transfers: min a       ADLs: min a       Functional Transfers: min a       Medication Management: independent        Communication: independent       Cognitive: independent       Dysphagia: n/a        Bladder n/a        Bowel: n/a     Functional Independent Measures: 6  7 = Independent  6 = Modified Independent  5 = Supervision  4 = Minimal Assist/ Contact Guard  3 = Moderate Assistance  2 = Maximum Assistance  1 = Total Assistance  0 = Unable to assess

## 2022-06-09 NOTE — PROGRESS NOTE ADULT - SUBJECTIVE AND OBJECTIVE BOX
SUBJECTIVE:  Pt seen and examined at bedside.  No acute overnight events.    Labs- unremarkable    ROS: Denies headache, dizziness, chest pain, dyspnea, abdominal pain, dysuria, diarrhea  Last BM 6/7    Vital Signs Last 24 Hrs  T(C): 36.8 (09 Jun 2022 08:13), Max: 37.2 (08 Jun 2022 21:04)  T(F): 98.3 (09 Jun 2022 08:13), Max: 98.9 (08 Jun 2022 21:04)  HR: 90 (09 Jun 2022 08:13) (84 - 95)  BP: 138/84 (09 Jun 2022 08:13) (128/82 - 160/88)  RR: 14 (09 Jun 2022 08:13) (14 - 18)  SpO2: 95% (09 Jun 2022 08:13) (95% - 98%)    PHYSICAL EXAM:  Constitutional - NAD, Comfortable  HEENT - NCAT, EOMI  Neck - Supple, No limited ROM  Chest - good chest expansion, good respiratory effort, CTAB   Cardio - warm and well perfused, RRR, no murmur  Abdomen -  Soft, NTND  Ext-RLE--Suture on left upper shin, LLE ace wrap and knee immobilizer in situ    AAO x 3  MMT 5/5 except LLE limited by wt bearing status and pain  Able to wiggle toes  Sensation intact    RECENT LABS  06-09    138  |  101  |  21  ----------------------------<  106<H>  4.3   |  28  |  0.93    06-07    137  |  99  |  19  ----------------------------<  178<H>  4.2   |  27  |  0.96    Ca    9.2      09 Jun 2022 06:38  Ca    9.5      07 Jun 2022 10:43    TPro  6.6  /  Alb  2.5<L>  /  TBili  0.6  /  DBili  x   /  AST  109<H>  /  ALT  242<H>  /  AlkPhos  400<H>  06-09  =                        8.9    9.13  )-----------( 565      ( 09 Jun 2022 06:38 )             26.3                         9.1    8.08  )-----------( 463      ( 07 Jun 2022 10:43 )             27.5     CAPILLARY BLOOD GLUCOSE    RECENT IMAGING  VA Duplex Lower Ext Vein Scan, Right (06.05.22 @ 10:33) >  IMPRESSION:  Acute deep venous thrombosis: below the knee at the level of the RIGHT   posterior tibial vein.      MEDICATIONS  (STANDING):  acetaminophen     Tablet .. 975 milliGRAM(s) Oral every 8 hours  apixaban 10 milliGRAM(s) Oral every 12 hours  influenza   Vaccine 0.5 milliLiter(s) IntraMuscular once  morphine ER Tablet 15 milliGRAM(s) Oral every 8 hours  pantoprazole    Tablet 40 milliGRAM(s) Oral before breakfast  polyethylene glycol 3350 17 Gram(s) Oral daily  senna 2 Tablet(s) Oral at bedtime    MEDICATIONS  (PRN):  bisacodyl Suppository 10 milliGRAM(s) Rectal daily PRN If no bowel movement  oxyCODONE    IR 5 milliGRAM(s) Oral every 6 hours PRN Moderate Pain (4 - 6)  oxyCODONE    IR 10 milliGRAM(s) Oral every 6 hours PRN Severe Pain (7 - 10)
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
## 2022-06-09 NOTE — DIETITIAN INITIAL EVALUATION ADULT - PERTINENT LABORATORY DATA
06-09    138  |  101  |  21  ----------------------------<  106<H>  4.3   |  28  |  0.93    Ca    9.2      09 Jun 2022 06:38    TPro  6.6  /  Alb  2.5<L>  /  TBili  0.6  /  DBili  x   /  AST  109<H>  /  ALT  242<H>  /  AlkPhos  400<H>  06-09

## 2022-06-09 NOTE — DIETITIAN INITIAL EVALUATION ADULT - OTHER INFO
Initial Nutrition Assessment   57yr Old Male   Denies Food Allergy/Intolerance  Tolerates Diet Well - No Chewing/Swallowing Complications (Per Patient)  Surgical Incision on Right Shin & No Pressure Ulcers (as Per Nursing Flow Sheets)  No Edema Noted (as Per Nursing Flow Sheets)  No Recent Nausea/Vomiting/Diarrhea/Constipation (as Per Patient)
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
## 2022-06-09 NOTE — DIETITIAN INITIAL EVALUATION ADULT - PERTINENT MEDS FT
MEDICATIONS  (STANDING):  apixaban 10 milliGRAM(s) Oral every 12 hours  influenza   Vaccine 0.5 milliLiter(s) IntraMuscular once  lactulose Syrup 10 Gram(s) Oral daily  morphine ER Tablet 15 milliGRAM(s) Oral every 8 hours  pantoprazole    Tablet 40 milliGRAM(s) Oral before breakfast  polyethylene glycol 3350 17 Gram(s) Oral daily  senna 2 Tablet(s) Oral at bedtime    MEDICATIONS  (PRN):  bisacodyl Suppository 10 milliGRAM(s) Rectal daily PRN If no bowel movement  oxyCODONE    IR 5 milliGRAM(s) Oral every 6 hours PRN Moderate Pain (4 - 6)  oxyCODONE    IR 10 milliGRAM(s) Oral every 6 hours PRN Severe Pain (7 - 10)

## 2022-06-09 NOTE — DIETITIAN INITIAL EVALUATION ADULT - NSFNSNUTRCHEWSWALLOWFT_GEN_A_CORE
Tolerates Diet Consistency Well  Tolerates Fluid Consistency Well  No Chewing/Swallowing Difficulties  (Per Patient)
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
## 2022-06-09 NOTE — DIETITIAN INITIAL EVALUATION ADULT - NS FNS DIET ORDER
on Regular Diet (IDDSI Level 7) w/ Thin Liquids (IDDSI Level 0)   Education Provided on Proper Nutrition

## 2022-06-09 NOTE — DIETITIAN INITIAL EVALUATION ADULT - ORAL INTAKE PTA/DIET HISTORY
Patient Does Not Follow Diet @Home But Has Been Limiting Calorie/Carb Intake Recently  Consumes 3 Meals a Day   Usually Cooks For Self/Family Cooks  Does Take Vitamin/Supplements @Home (Multivitamin, Vitamin C, B Complex, Zinc)

## 2022-06-10 DIAGNOSIS — S82.252B DISPLACED COMMINUTED FRACTURE OF SHAFT OF LEFT TIBIA, INITIAL ENCOUNTER FOR OPEN FRACTURE TYPE I OR II: ICD-10-CM

## 2022-06-10 LAB
ALBUMIN SERPL ELPH-MCNC: 2.6 G/DL — LOW (ref 3.3–5)
ALP SERPL-CCNC: 466 U/L — HIGH (ref 40–120)
ALT FLD-CCNC: 207 U/L — HIGH (ref 10–45)
AST SERPL-CCNC: 96 U/L — HIGH (ref 10–40)
BILIRUB DIRECT SERPL-MCNC: 0.2 MG/DL — SIGNIFICANT CHANGE UP (ref 0–0.3)
BILIRUB INDIRECT FLD-MCNC: 0.4 MG/DL — SIGNIFICANT CHANGE UP (ref 0.2–1)
BILIRUB SERPL-MCNC: 0.6 MG/DL — SIGNIFICANT CHANGE UP (ref 0.2–1.2)
PROT SERPL-MCNC: 6.8 G/DL — SIGNIFICANT CHANGE UP (ref 6–8.3)

## 2022-06-10 PROCEDURE — 99232 SBSQ HOSP IP/OBS MODERATE 35: CPT

## 2022-06-10 RX ADMIN — MORPHINE SULFATE 15 MILLIGRAM(S): 50 CAPSULE, EXTENDED RELEASE ORAL at 22:01

## 2022-06-10 RX ADMIN — OXYCODONE HYDROCHLORIDE 10 MILLIGRAM(S): 5 TABLET ORAL at 01:50

## 2022-06-10 RX ADMIN — MORPHINE SULFATE 15 MILLIGRAM(S): 50 CAPSULE, EXTENDED RELEASE ORAL at 15:55

## 2022-06-10 RX ADMIN — MORPHINE SULFATE 15 MILLIGRAM(S): 50 CAPSULE, EXTENDED RELEASE ORAL at 21:27

## 2022-06-10 RX ADMIN — MORPHINE SULFATE 15 MILLIGRAM(S): 50 CAPSULE, EXTENDED RELEASE ORAL at 06:13

## 2022-06-10 RX ADMIN — APIXABAN 10 MILLIGRAM(S): 2.5 TABLET, FILM COATED ORAL at 17:28

## 2022-06-10 RX ADMIN — APIXABAN 10 MILLIGRAM(S): 2.5 TABLET, FILM COATED ORAL at 06:13

## 2022-06-10 RX ADMIN — OXYCODONE HYDROCHLORIDE 10 MILLIGRAM(S): 5 TABLET ORAL at 08:02

## 2022-06-10 RX ADMIN — POLYETHYLENE GLYCOL 3350 17 GRAM(S): 17 POWDER, FOR SOLUTION ORAL at 12:51

## 2022-06-10 RX ADMIN — OXYCODONE HYDROCHLORIDE 10 MILLIGRAM(S): 5 TABLET ORAL at 02:27

## 2022-06-10 RX ADMIN — SENNA PLUS 2 TABLET(S): 8.6 TABLET ORAL at 21:27

## 2022-06-10 RX ADMIN — OXYCODONE HYDROCHLORIDE 10 MILLIGRAM(S): 5 TABLET ORAL at 19:26

## 2022-06-10 RX ADMIN — PANTOPRAZOLE SODIUM 40 MILLIGRAM(S): 20 TABLET, DELAYED RELEASE ORAL at 06:13

## 2022-06-10 RX ADMIN — OXYCODONE HYDROCHLORIDE 10 MILLIGRAM(S): 5 TABLET ORAL at 09:00

## 2022-06-10 RX ADMIN — MORPHINE SULFATE 15 MILLIGRAM(S): 50 CAPSULE, EXTENDED RELEASE ORAL at 06:38

## 2022-06-10 RX ADMIN — MORPHINE SULFATE 15 MILLIGRAM(S): 50 CAPSULE, EXTENDED RELEASE ORAL at 14:55

## 2022-06-10 NOTE — CHART NOTE - NSCHARTNOTEFT_GEN_A_CORE
Reviewed chart. Approached Pt for his initial neuropsych eval. Introduced the role of neuropsych in the rehab team, explained the purpose and nature of the eval. Pt declined participating due to feeling very fatigue due to lack of sleep the previous night. Agreed to meet within the next business day.

## 2022-06-10 NOTE — PROGRESS NOTE ADULT - SUBJECTIVE AND OBJECTIVE BOX
Medicine Progress Note    Patient is a 57y old  Male who presents with a chief complaint of Nondisplaced bicondylar fracture of left tibia, initial encounter for closed fracture     (09 Jun 2022 11:16)      SUBJECTIVE / OVERNIGHT EVENTS:  seen and examined earlier robert m  Chart reviewed  No overnight events  Limb weakness improving with therapy  constipation  LLE pain. jut got med  No other complaints    ADDITIONAL REVIEW OF SYSTEMS:  no fever/chills/CP/sob/palpitation/dizziness/ abd pain/nausea/vomiting/diarrhea/headaches. all other ROS neg    MEDICATIONS  (STANDING):  apixaban 10 milliGRAM(s) Oral every 12 hours  lactulose Syrup 10 Gram(s) Oral daily  morphine ER Tablet 15 milliGRAM(s) Oral every 8 hours  pantoprazole    Tablet 40 milliGRAM(s) Oral before breakfast  polyethylene glycol 3350 17 Gram(s) Oral daily  senna 2 Tablet(s) Oral at bedtime    MEDICATIONS  (PRN):  bisacodyl Suppository 10 milliGRAM(s) Rectal daily PRN If no bowel movement  oxyCODONE    IR 5 milliGRAM(s) Oral every 6 hours PRN Moderate Pain (4 - 6)  oxyCODONE    IR 10 milliGRAM(s) Oral every 6 hours PRN Severe Pain (7 - 10)    CAPILLARY BLOOD GLUCOSE        I&O's Summary      PHYSICAL EXAM:  Vital Signs Last 24 Hrs  T(C): 37.1 (10 Boni 2022 08:06), Max: 37.1 (10 Boni 2022 08:06)  T(F): 98.7 (10 Boni 2022 08:06), Max: 98.7 (10 Boni 2022 08:06)  HR: 89 (10 Boni 2022 08:06) (89 - 99)  BP: 129/88 (10 Boni 2022 08:06) (120/80 - 129/88)  BP(mean): --  RR: 16 (10 Boni 2022 08:06) (14 - 16)  SpO2: 96% (10 Boni 2022 08:06) (96% - 97%)    GENERAL: Not in distress. Alert    HEENT: clear conjuctiva, MMM. no pallor or icterus  CARDIOVASCULAR: RRR S1, S2. No murmur/rubs/gallop  LUNGS: BLAE+, no rales, no wheezing, no rhonchi.    ABDOMEN: ND. Soft,  NT, no guarding / rebound / rigidity. BS normoactive  BACK: No spine tenderness.  EXTREMITIES: no edema. LLE in ace wrap. RLE suture intact  SKIN: warm and dry. no cellulitis on exposed skin  PSYCHIATRIC: Calm.  No agitation.    LABS:                        8.9    9.13  )-----------( 565      ( 09 Jun 2022 06:38 )             26.3     06-09    138  |  101  |  21  ----------------------------<  106<H>  4.3   |  28  |  0.93    Ca    9.2      09 Jun 2022 06:38    TPro  6.8  /  Alb  2.6<L>  /  TBili  0.6  /  DBili  0.2  /  AST  96<H>  /  ALT  207<H>  /  AlkPhos  466<H>  06-10      COVID-19 PCR: NotDetec (08 Jun 2022 21:00)  COVID-19 PCR: NotDetec (07 Jun 2022 10:35)  COVID-19 PCR: NotDetec (03 Jun 2022 07:32)  COVID-19 PCR: NotDetec (29 May 2022 10:41)      RADIOLOGY & ADDITIONAL TESTS:  Imaging from Last 24 Hours:    Electrocardiogram/QTc Interval:    COORDINATION OF CARE:  Care Discussed with Consultants/Other Providers:
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
## 2022-06-10 NOTE — PROGRESS NOTE ADULT - SUBJECTIVE AND OBJECTIVE BOX
SUBJECTIVE:  Pt seen and examined at bedside.  No acute overnight events.    Labs- unremarkable    ROS: Denies headache, dizziness, chest pain, dyspnea, abdominal pain, dysuria, diarrhea  Last BM 6/7    Vital Signs Last 24 Hrs  T(C): 36.8 (09 Jun 2022 08:13), Max: 37.2 (08 Jun 2022 21:04)  T(F): 98.3 (09 Jun 2022 08:13), Max: 98.9 (08 Jun 2022 21:04)  HR: 90 (09 Jun 2022 08:13) (84 - 95)  BP: 138/84 (09 Jun 2022 08:13) (128/82 - 160/88)  RR: 14 (09 Jun 2022 08:13) (14 - 18)  SpO2: 95% (09 Jun 2022 08:13) (95% - 98%)    PHYSICAL EXAM:  Constitutional - NAD, Comfortable  HEENT - NCAT, EOMI  Neck - Supple, No limited ROM  Chest - good chest expansion, good respiratory effort, CTAB   Cardio - warm and well perfused, RRR, no murmur  Abdomen -  Soft, NTND  Ext-RLE--Suture on left upper shin, LLE ace wrap and knee immobilizer in situ    AAO x 3  MMT 5/5 except LLE limited by wt bearing status and pain  Able to wiggle toes  Sensation intact    RECENT LABS  06-09    138  |  101  |  21  ----------------------------<  106<H>  4.3   |  28  |  0.93    06-07    137  |  99  |  19  ----------------------------<  178<H>  4.2   |  27  |  0.96    Ca    9.2      09 Jun 2022 06:38  Ca    9.5      07 Jun 2022 10:43    TPro  6.6  /  Alb  2.5<L>  /  TBili  0.6  /  DBili  x   /  AST  109<H>  /  ALT  242<H>  /  AlkPhos  400<H>  06-09  =                        8.9    9.13  )-----------( 565      ( 09 Jun 2022 06:38 )             26.3                         9.1    8.08  )-----------( 463      ( 07 Jun 2022 10:43 )             27.5     CAPILLARY BLOOD GLUCOSE    RECENT IMAGING  VA Duplex Lower Ext Vein Scan, Right (06.05.22 @ 10:33) >  IMPRESSION:  Acute deep venous thrombosis: below the knee at the level of the RIGHT   posterior tibial vein.      MEDICATIONS  (STANDING):  acetaminophen     Tablet .. 975 milliGRAM(s) Oral every 8 hours  apixaban 10 milliGRAM(s) Oral every 12 hours  influenza   Vaccine 0.5 milliLiter(s) IntraMuscular once  morphine ER Tablet 15 milliGRAM(s) Oral every 8 hours  pantoprazole    Tablet 40 milliGRAM(s) Oral before breakfast  polyethylene glycol 3350 17 Gram(s) Oral daily  senna 2 Tablet(s) Oral at bedtime    MEDICATIONS  (PRN):  bisacodyl Suppository 10 milliGRAM(s) Rectal daily PRN If no bowel movement  oxyCODONE    IR 5 milliGRAM(s) Oral every 6 hours PRN Moderate Pain (4 - 6)  oxyCODONE    IR 10 milliGRAM(s) Oral every 6 hours PRN Severe Pain (7 - 10)       SUBJECTIVE:  Pt seen and examined at bedside.  No interval med events  Last BM today  Pain controlled    ROS: Denies headache, dizziness, chest pain, dyspnea, abdominal pain, dysuria, diarrhea  Last BM 6/10    Engaging in therapy,    Vital Signs Last 24 Hrs  T(C): 37 (10 Boni 2022 20:20), Max: 37.1 (10 Boni 2022 08:06)  T(F): 98.6 (10 Boni 2022 20:20), Max: 98.7 (10 Boni 2022 08:06)  HR: 94 (10 Boni 2022 20:20) (89 - 94)  BP: 111/73 (10 Boni 2022 20:20) (111/73 - 129/88)  RR: 16 (10 Boni 2022 20:20) (16 - 16)  SpO2: 93% (10 Boni 2022 20:20) (93% - 96%)      PHYSICAL EXAM:  Constitutional - NAD, Comfortable  HEENT - NCAT, EOMI  Neck - Supple, No limited ROM  Chest - Clear  Cardio - warm and well perfused, RRR, no murmur  Abdomen -  Soft, non tender  Ext-RLE--Suture on left upper shin, LLE ace wrap and knee immobilizer in situ    AAO x 3  MMT 5/5 except LLE limited by wt bearing status and pain  Able to wiggle toes  Sensation intact    RECENT LABS  06-09    138  |  101  |  21  ----------------------------<  106<H>  4.3   |  28  |  0.93    06-07    137  |  99  |  19  ----------------------------<  178<H>  4.2   |  27  |  0.96    Ca    9.2      09 Jun 2022 06:38  Ca    9.5      07 Jun 2022 10:43    TPro  6.6  /  Alb  2.5<L>  /  TBili  0.6  /  DBili  x   /  AST  109<H>  /  ALT  242<H>  /  AlkPhos  400<H>  06-09  =                        8.9    9.13  )-----------( 565      ( 09 Jun 2022 06:38 )             26.3                         9.1    8.08  )-----------( 463      ( 07 Jun 2022 10:43 )             27.5     MEDICATIONS  (STANDING):  apixaban 10 milliGRAM(s) Oral every 12 hours  lactulose Syrup 10 Gram(s) Oral daily  morphine ER Tablet 15 milliGRAM(s) Oral every 8 hours  pantoprazole    Tablet 40 milliGRAM(s) Oral before breakfast  polyethylene glycol 3350 17 Gram(s) Oral daily  senna 2 Tablet(s) Oral at bedtime    MEDICATIONS  (PRN):  bisacodyl Suppository 10 milliGRAM(s) Rectal daily PRN If no bowel movement  oxyCODONE    IR 5 milliGRAM(s) Oral every 6 hours PRN Moderate Pain (4 - 6)  oxyCODONE    IR 10 milliGRAM(s) Oral every 6 hours PRN Severe Pain (7 - 10)
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
## 2022-06-11 DIAGNOSIS — N40.0 BENIGN PROSTATIC HYPERPLASIA WITHOUT LOWER URINARY TRACT SYMPTOMS: ICD-10-CM

## 2022-06-11 DIAGNOSIS — S82.202A UNSPECIFIED FRACTURE OF SHAFT OF LEFT TIBIA, INITIAL ENCOUNTER FOR CLOSED FRACTURE: ICD-10-CM

## 2022-06-11 DIAGNOSIS — I82.412 ACUTE EMBOLISM AND THROMBOSIS OF LEFT FEMORAL VEIN: ICD-10-CM

## 2022-06-11 DIAGNOSIS — R74.01 ELEVATION OF LEVELS OF LIVER TRANSAMINASE LEVELS: ICD-10-CM

## 2022-06-11 PROCEDURE — 99232 SBSQ HOSP IP/OBS MODERATE 35: CPT

## 2022-06-11 PROCEDURE — 76705 ECHO EXAM OF ABDOMEN: CPT | Mod: 26

## 2022-06-11 RX ORDER — APIXABAN 2.5 MG/1
10 TABLET, FILM COATED ORAL EVERY 12 HOURS
Refills: 0 | Status: COMPLETED | OUTPATIENT
Start: 2022-06-11 | End: 2022-06-11

## 2022-06-11 RX ORDER — APIXABAN 2.5 MG/1
5 TABLET, FILM COATED ORAL EVERY 12 HOURS
Refills: 0 | Status: DISCONTINUED | OUTPATIENT
Start: 2022-06-12 | End: 2022-06-18

## 2022-06-11 RX ADMIN — MORPHINE SULFATE 15 MILLIGRAM(S): 50 CAPSULE, EXTENDED RELEASE ORAL at 22:30

## 2022-06-11 RX ADMIN — OXYCODONE HYDROCHLORIDE 10 MILLIGRAM(S): 5 TABLET ORAL at 01:23

## 2022-06-11 RX ADMIN — MORPHINE SULFATE 15 MILLIGRAM(S): 50 CAPSULE, EXTENDED RELEASE ORAL at 21:58

## 2022-06-11 RX ADMIN — APIXABAN 10 MILLIGRAM(S): 2.5 TABLET, FILM COATED ORAL at 05:48

## 2022-06-11 RX ADMIN — OXYCODONE HYDROCHLORIDE 10 MILLIGRAM(S): 5 TABLET ORAL at 17:53

## 2022-06-11 RX ADMIN — OXYCODONE HYDROCHLORIDE 10 MILLIGRAM(S): 5 TABLET ORAL at 00:36

## 2022-06-11 RX ADMIN — OXYCODONE HYDROCHLORIDE 10 MILLIGRAM(S): 5 TABLET ORAL at 13:11

## 2022-06-11 RX ADMIN — OXYCODONE HYDROCHLORIDE 10 MILLIGRAM(S): 5 TABLET ORAL at 22:30

## 2022-06-11 RX ADMIN — MORPHINE SULFATE 15 MILLIGRAM(S): 50 CAPSULE, EXTENDED RELEASE ORAL at 16:19

## 2022-06-11 RX ADMIN — OXYCODONE HYDROCHLORIDE 10 MILLIGRAM(S): 5 TABLET ORAL at 07:23

## 2022-06-11 RX ADMIN — POLYETHYLENE GLYCOL 3350 17 GRAM(S): 17 POWDER, FOR SOLUTION ORAL at 12:13

## 2022-06-11 RX ADMIN — OXYCODONE HYDROCHLORIDE 10 MILLIGRAM(S): 5 TABLET ORAL at 06:51

## 2022-06-11 RX ADMIN — PANTOPRAZOLE SODIUM 40 MILLIGRAM(S): 20 TABLET, DELAYED RELEASE ORAL at 05:48

## 2022-06-11 RX ADMIN — APIXABAN 10 MILLIGRAM(S): 2.5 TABLET, FILM COATED ORAL at 18:08

## 2022-06-11 RX ADMIN — MORPHINE SULFATE 15 MILLIGRAM(S): 50 CAPSULE, EXTENDED RELEASE ORAL at 05:48

## 2022-06-11 RX ADMIN — OXYCODONE HYDROCHLORIDE 10 MILLIGRAM(S): 5 TABLET ORAL at 19:27

## 2022-06-11 RX ADMIN — MORPHINE SULFATE 15 MILLIGRAM(S): 50 CAPSULE, EXTENDED RELEASE ORAL at 06:54

## 2022-06-11 RX ADMIN — SENNA PLUS 2 TABLET(S): 8.6 TABLET ORAL at 21:58

## 2022-06-11 RX ADMIN — MORPHINE SULFATE 15 MILLIGRAM(S): 50 CAPSULE, EXTENDED RELEASE ORAL at 14:24

## 2022-06-11 NOTE — PROGRESS NOTE ADULT - SUBJECTIVE AND OBJECTIVE BOX
Patient is a 57y old  Male who presents with a chief complaint of Left tib/fib fracture (10 Boni 2022 18:36)      History, interim events and clinically pertinent issues reviewed; patient interviewed and examined.  His pain is controlled at present. Slept OK  +BM's voiding   REVIEW OF SYMPTOMS: patient denies HA's, CP, palpitations, shortness of breath or upper respiratory symptoms, nausea, vomiting, diarrhea, constipation, dysuria, bruising/bleeding and all other systems were reviewed as negative      ALLERGIES:  Allergy Status Unknown    MEDICATIONS  (STANDING):  apixaban 10 milliGRAM(s) Oral every 12 hours  lactulose Syrup 10 Gram(s) Oral daily  morphine ER Tablet 15 milliGRAM(s) Oral every 8 hours  pantoprazole    Tablet 40 milliGRAM(s) Oral before breakfast  polyethylene glycol 3350 17 Gram(s) Oral daily  senna 2 Tablet(s) Oral at bedtime    MEDICATIONS  (PRN):  bisacodyl Suppository 10 milliGRAM(s) Rectal daily PRN If no bowel movement  oxyCODONE    IR 5 milliGRAM(s) Oral every 6 hours PRN Moderate Pain (4 - 6)  oxyCODONE    IR 10 milliGRAM(s) Oral every 6 hours PRN Severe Pain (7 - 10)    Vital Signs Last 24 Hrs  T(F): 98.4 (11 Jun 2022 08:49), Max: 98.6 (10 Boni 2022 20:20)  HR: 87 (11 Jun 2022 08:49) (87 - 94)  BP: 11/74 (11 Jun 2022 08:49) (11/74 - 111/73)  RR: 17 (11 Jun 2022 08:49) (16 - 17)  SpO2: 97% (11 Jun 2022 08:49) (93% - 97%)  I&O's Summary    BMI (kg/m2): 27.4 (06-08-22 @ 12:30)          PHYSICAL EXAM:  General: NAD, A/O x 3  ENT: MMM  Neck: Supple, No JVD  Lungs: Clear to auscultation bilaterally  Cardio: RRR, S1/S2, No murmurs  Abdomen: Soft, Nontender, Nondistended; Bowel sounds present  Extremities: No calf tenderness, left LE knee immobilizer in place, toes sensate and mobile      LABS:                        8.9    9.13  )-----------( 565      ( 09 Jun 2022 06:38 )             26.3       06-09    138  |  101  |  21  ----------------------------<  106  4.3   |  28  |  0.93    Ca    9.2      09 Jun 2022 06:38    TPro  6.8  /  Alb  2.6  /  TBili  0.6  /  DBili  0.2  /  AST  96  /  ALT  207  /  AlkPhos  466  06-10     US Abdomen     ACC: [id number] EXAM:  US ABDOMEN LIMITED                          PROCEDURE DATE:  06/11/2022          INTERPRETATION:  CLINICAL INFORMATION: Abnormal liver function tests.    COMPARISON: Abdominal CT dated 05/27/2014 and renal ultrasound dated   05/26/2014.    TECHNIQUE: Sonography of the right upper quadrant.    FINDINGS:    Liver: Normal in size and echogenicity. No focal lesions are identified.  Bile ducts: Normal caliber. Common bile duct measures 5 mm.  Gallbladder: Within normal limits.  Pancreas: Visualized portions are within normal limits.  Right kidney: 12.6 cm. No hydronephrosis.  Ascites: None.  IVC: Visualized portions are within normal limits.    IMPRESSION:    Normal right upper quadrant abdominal ultrasound.        --- End of Report ---            TRAMAINE GUTIERREZ MD; Attending Radiologist  This document has been electronically signed. Jun 11 2022  9:16AM    < end of copied text >                             COVID-19 PCR: NotDetec (06-08-22 @ 21:00)  COVID-19 PCR: NotDetec (06-07-22 @ 10:35)  COVID-19 PCR: NotDetec (06-03-22 @ 07:32)  COVID-19 PCR: NotDetec (05-29-22 @ 10:41)
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
## 2022-06-12 PROCEDURE — 99232 SBSQ HOSP IP/OBS MODERATE 35: CPT

## 2022-06-12 RX ADMIN — POLYETHYLENE GLYCOL 3350 17 GRAM(S): 17 POWDER, FOR SOLUTION ORAL at 12:03

## 2022-06-12 RX ADMIN — OXYCODONE HYDROCHLORIDE 10 MILLIGRAM(S): 5 TABLET ORAL at 04:00

## 2022-06-12 RX ADMIN — OXYCODONE HYDROCHLORIDE 10 MILLIGRAM(S): 5 TABLET ORAL at 16:30

## 2022-06-12 RX ADMIN — PANTOPRAZOLE SODIUM 40 MILLIGRAM(S): 20 TABLET, DELAYED RELEASE ORAL at 06:08

## 2022-06-12 RX ADMIN — MORPHINE SULFATE 15 MILLIGRAM(S): 50 CAPSULE, EXTENDED RELEASE ORAL at 21:00

## 2022-06-12 RX ADMIN — OXYCODONE HYDROCHLORIDE 10 MILLIGRAM(S): 5 TABLET ORAL at 15:58

## 2022-06-12 RX ADMIN — OXYCODONE HYDROCHLORIDE 10 MILLIGRAM(S): 5 TABLET ORAL at 03:10

## 2022-06-12 RX ADMIN — APIXABAN 5 MILLIGRAM(S): 2.5 TABLET, FILM COATED ORAL at 06:09

## 2022-06-12 RX ADMIN — MORPHINE SULFATE 15 MILLIGRAM(S): 50 CAPSULE, EXTENDED RELEASE ORAL at 14:20

## 2022-06-12 RX ADMIN — APIXABAN 5 MILLIGRAM(S): 2.5 TABLET, FILM COATED ORAL at 17:22

## 2022-06-12 RX ADMIN — OXYCODONE HYDROCHLORIDE 10 MILLIGRAM(S): 5 TABLET ORAL at 14:01

## 2022-06-12 RX ADMIN — OXYCODONE HYDROCHLORIDE 10 MILLIGRAM(S): 5 TABLET ORAL at 23:35

## 2022-06-12 RX ADMIN — OXYCODONE HYDROCHLORIDE 10 MILLIGRAM(S): 5 TABLET ORAL at 22:51

## 2022-06-12 RX ADMIN — MORPHINE SULFATE 15 MILLIGRAM(S): 50 CAPSULE, EXTENDED RELEASE ORAL at 06:45

## 2022-06-12 RX ADMIN — MORPHINE SULFATE 15 MILLIGRAM(S): 50 CAPSULE, EXTENDED RELEASE ORAL at 13:08

## 2022-06-12 RX ADMIN — MORPHINE SULFATE 15 MILLIGRAM(S): 50 CAPSULE, EXTENDED RELEASE ORAL at 22:00

## 2022-06-12 RX ADMIN — OXYCODONE HYDROCHLORIDE 10 MILLIGRAM(S): 5 TABLET ORAL at 09:47

## 2022-06-12 RX ADMIN — MORPHINE SULFATE 15 MILLIGRAM(S): 50 CAPSULE, EXTENDED RELEASE ORAL at 06:09

## 2022-06-12 NOTE — PROGRESS NOTE ADULT - SUBJECTIVE AND OBJECTIVE BOX
Patient is a 57y old  Male who presents with a chief complaint of Left tib/fib fracture (11 Jun 2022 17:12)    Patient examined and interviewed. There were no acute medical events yesterday or overnight and patient is without complaints this morning.  Pain is controlled, did not sleep great but has no other new complaints    ALLERGIES:  Allergy Status Unknown    MEDICATIONS  (STANDING):  apixaban 5 milliGRAM(s) Oral every 12 hours  lactulose Syrup 10 Gram(s) Oral daily  morphine ER Tablet 15 milliGRAM(s) Oral every 8 hours  pantoprazole    Tablet 40 milliGRAM(s) Oral before breakfast  polyethylene glycol 3350 17 Gram(s) Oral daily  senna 2 Tablet(s) Oral at bedtime    MEDICATIONS  (PRN):  bisacodyl Suppository 10 milliGRAM(s) Rectal daily PRN If no bowel movement  oxyCODONE    IR 5 milliGRAM(s) Oral every 6 hours PRN Moderate Pain (4 - 6)  oxyCODONE    IR 10 milliGRAM(s) Oral every 6 hours PRN Severe Pain (7 - 10)    Vital Signs Last 24 Hrs  T(F): 98.5 (11 Jun 2022 19:29), Max: 98.5 (11 Jun 2022 19:29)  HR: 95 (11 Jun 2022 19:29) (95 - 95)  BP: 101/70 (11 Jun 2022 19:29) (101/70 - 101/70)  RR: 17 (11 Jun 2022 19:29) (17 - 17)  SpO2: 100% (11 Jun 2022 19:29) (100% - 100%)  I&O's Summary    11 Jun 2022 07:01  -  12 Jun 2022 07:00  --------------------------------------------------------  IN: 0 mL / OUT: 200 mL / NET: -200 mL      BMI (kg/m2): 27.4 (06-08-22 @ 12:30)      PHYSICAL EXAM:  General: NAD, A/O x 3  ENT: MMM  Neck: Supple, No JVD  Lungs: Clear to auscultation bilaterally  Cardio: RRR, S1/S2, No murmurs  Abdomen: Soft, Nontender, Nondistended; Bowel sounds present  Extremities: No calf tenderness, No pitting edema Left LE knee immobilizer; toes sensate and mobile      LABS: There are no new laboratory or radiologic studies resulted at the time this progress note was authored    TPro  6.8  /  Alb  2.6  /  TBili  0.6  /  DBili  0.2  /  AST  96  /  ALT  207  /  AlkPhos  466  06-10         COVID-19 PCR: NotDetec (06-08-22 @ 21:00)  COVID-19 PCR: NotDetec (06-07-22 @ 10:35)  COVID-19 PCR: NotDetec (06-03-22 @ 07:32)  COVID-19 PCR: NotDetec (05-29-22 @ 10:41)
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
## 2022-06-12 NOTE — PROGRESS NOTE ADULT - NSPROGADDITIONALINFOA_GEN_ALL_CORE
I have personally interviewed and examined this patient, reviewed pertinent clinical information, and performed the evaluation and management services provided at today's visit for inpatient medical follow up    I am available to discuss any issues related to the medical care of this patient on the unit, or by phone at [phone]
I have personally interviewed and examined this patient, reviewed pertinent clinical information, and performed the evaluation and management services provided at today's visit for inpatient medical follow up    I am available to discuss any issues related to the medical care of this patient on the unit, or by phone at [phone]

## 2022-06-13 LAB
ALBUMIN SERPL ELPH-MCNC: 2.7 G/DL — LOW (ref 3.3–5)
ALP SERPL-CCNC: 405 U/L — HIGH (ref 40–120)
ALT FLD-CCNC: 118 U/L — HIGH (ref 10–45)
ANION GAP SERPL CALC-SCNC: 7 MMOL/L — SIGNIFICANT CHANGE UP (ref 5–17)
AST SERPL-CCNC: 41 U/L — HIGH (ref 10–40)
BILIRUB SERPL-MCNC: 0.6 MG/DL — SIGNIFICANT CHANGE UP (ref 0.2–1.2)
BUN SERPL-MCNC: 33 MG/DL — HIGH (ref 7–23)
CALCIUM SERPL-MCNC: 9.4 MG/DL — SIGNIFICANT CHANGE UP (ref 8.4–10.5)
CHLORIDE SERPL-SCNC: 101 MMOL/L — SIGNIFICANT CHANGE UP (ref 96–108)
CO2 SERPL-SCNC: 29 MMOL/L — SIGNIFICANT CHANGE UP (ref 22–31)
CREAT SERPL-MCNC: 1.14 MG/DL — SIGNIFICANT CHANGE UP (ref 0.5–1.3)
EGFR: 75 ML/MIN/1.73M2 — SIGNIFICANT CHANGE UP
GLUCOSE SERPL-MCNC: 116 MG/DL — HIGH (ref 70–99)
HCT VFR BLD CALC: 26.9 % — LOW (ref 39–50)
HGB BLD-MCNC: 8.9 G/DL — LOW (ref 13–17)
MCHC RBC-ENTMCNC: 31.8 PG — SIGNIFICANT CHANGE UP (ref 27–34)
MCHC RBC-ENTMCNC: 33.1 GM/DL — SIGNIFICANT CHANGE UP (ref 32–36)
MCV RBC AUTO: 96.1 FL — SIGNIFICANT CHANGE UP (ref 80–100)
NRBC # BLD: 0 /100 WBCS — SIGNIFICANT CHANGE UP (ref 0–0)
PLATELET # BLD AUTO: 793 K/UL — HIGH (ref 150–400)
POTASSIUM SERPL-MCNC: 4.1 MMOL/L — SIGNIFICANT CHANGE UP (ref 3.5–5.3)
POTASSIUM SERPL-SCNC: 4.1 MMOL/L — SIGNIFICANT CHANGE UP (ref 3.5–5.3)
PROT SERPL-MCNC: 6.9 G/DL — SIGNIFICANT CHANGE UP (ref 6–8.3)
RBC # BLD: 2.8 M/UL — LOW (ref 4.2–5.8)
RBC # FLD: 12.8 % — SIGNIFICANT CHANGE UP (ref 10.3–14.5)
SODIUM SERPL-SCNC: 137 MMOL/L — SIGNIFICANT CHANGE UP (ref 135–145)
WBC # BLD: 8.21 K/UL — SIGNIFICANT CHANGE UP (ref 3.8–10.5)
WBC # FLD AUTO: 8.21 K/UL — SIGNIFICANT CHANGE UP (ref 3.8–10.5)

## 2022-06-13 PROCEDURE — 99233 SBSQ HOSP IP/OBS HIGH 50: CPT

## 2022-06-13 PROCEDURE — 99232 SBSQ HOSP IP/OBS MODERATE 35: CPT

## 2022-06-13 PROCEDURE — 96116 NUBHVL XM PHYS/QHP 1ST HR: CPT

## 2022-06-13 RX ADMIN — MORPHINE SULFATE 15 MILLIGRAM(S): 50 CAPSULE, EXTENDED RELEASE ORAL at 06:00

## 2022-06-13 RX ADMIN — OXYCODONE HYDROCHLORIDE 10 MILLIGRAM(S): 5 TABLET ORAL at 19:58

## 2022-06-13 RX ADMIN — MORPHINE SULFATE 15 MILLIGRAM(S): 50 CAPSULE, EXTENDED RELEASE ORAL at 22:00

## 2022-06-13 RX ADMIN — MORPHINE SULFATE 15 MILLIGRAM(S): 50 CAPSULE, EXTENDED RELEASE ORAL at 14:00

## 2022-06-13 RX ADMIN — MORPHINE SULFATE 15 MILLIGRAM(S): 50 CAPSULE, EXTENDED RELEASE ORAL at 23:00

## 2022-06-13 RX ADMIN — MORPHINE SULFATE 15 MILLIGRAM(S): 50 CAPSULE, EXTENDED RELEASE ORAL at 13:17

## 2022-06-13 RX ADMIN — OXYCODONE HYDROCHLORIDE 10 MILLIGRAM(S): 5 TABLET ORAL at 20:55

## 2022-06-13 RX ADMIN — MORPHINE SULFATE 15 MILLIGRAM(S): 50 CAPSULE, EXTENDED RELEASE ORAL at 05:01

## 2022-06-13 RX ADMIN — APIXABAN 5 MILLIGRAM(S): 2.5 TABLET, FILM COATED ORAL at 17:58

## 2022-06-13 RX ADMIN — PANTOPRAZOLE SODIUM 40 MILLIGRAM(S): 20 TABLET, DELAYED RELEASE ORAL at 05:01

## 2022-06-13 RX ADMIN — OXYCODONE HYDROCHLORIDE 10 MILLIGRAM(S): 5 TABLET ORAL at 00:00

## 2022-06-13 RX ADMIN — OXYCODONE HYDROCHLORIDE 10 MILLIGRAM(S): 5 TABLET ORAL at 08:26

## 2022-06-13 RX ADMIN — APIXABAN 5 MILLIGRAM(S): 2.5 TABLET, FILM COATED ORAL at 05:01
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
## 2022-06-13 NOTE — PROGRESS NOTE ADULT - SUBJECTIVE AND OBJECTIVE BOX
Medicine Progress Note    Patient is a 57y old  Male who presents with a chief complaint of Left tib/fib fracture (12 Jun 2022 13:02)      SUBJECTIVE / OVERNIGHT EVENTS:  seen and examined  Chart reviewed  No overnight events  Limb weakness improving with therapy  BM+  RLE pain controlled with meds    ADDITIONAL REVIEW OF SYSTEMS:  no fever/chills/CP/sob/palpitation/dizziness/ abd pain/nausea/vomiting/diarrhea/constipation/headaches. all other ROS neg    MEDICATIONS  (STANDING):  apixaban 5 milliGRAM(s) Oral every 12 hours  lactulose Syrup 10 Gram(s) Oral daily  morphine ER Tablet 15 milliGRAM(s) Oral every 8 hours  pantoprazole    Tablet 40 milliGRAM(s) Oral before breakfast  polyethylene glycol 3350 17 Gram(s) Oral daily  senna 2 Tablet(s) Oral at bedtime    MEDICATIONS  (PRN):  bisacodyl Suppository 10 milliGRAM(s) Rectal daily PRN If no bowel movement  oxyCODONE    IR 5 milliGRAM(s) Oral every 6 hours PRN Moderate Pain (4 - 6)  oxyCODONE    IR 10 milliGRAM(s) Oral every 6 hours PRN Severe Pain (7 - 10)    CAPILLARY BLOOD GLUCOSE        I&O's Summary    12 Jun 2022 07:01  -  13 Jun 2022 07:00  --------------------------------------------------------  IN: 0 mL / OUT: 1700 mL / NET: -1700 mL        PHYSICAL EXAM:  Vital Signs Last 24 Hrs  T(C): 37.2 (13 Jun 2022 08:19), Max: 37.2 (12 Jun 2022 20:55)  T(F): 99 (13 Jun 2022 08:19), Max: 99 (12 Jun 2022 20:55)  HR: 92 (13 Jun 2022 08:19) (92 - 95)  BP: 121/65 (13 Jun 2022 08:19) (112/73 - 121/65)  BP(mean): --  RR: 16 (13 Jun 2022 08:19) (16 - 16)  SpO2: 96% (13 Jun 2022 08:19) (95% - 96%)      GENERAL: Not in distress. Alert    HEENT: clear conjuctiva, MMM. no pallor or icterus  CARDIOVASCULAR: RRR S1, S2. No murmur/rubs/gallop  LUNGS: BLAE+, no rales, no wheezing, no rhonchi.    ABDOMEN: ND. Soft,  NT, no guarding / rebound / rigidity. BS normoactive  BACK: No spine tenderness.  EXTREMITIES: no edema. LLE in ace wrap and cast  SKIN: warm and dry. no cellulitis on exposed skin. healed scab RLE  PSYCHIATRIC: Calm.  No agitation.    LABS:                        8.9    8.21  )-----------( 793      ( 13 Jun 2022 06:00 )             26.9     06-13    137  |  101  |  33<H>  ----------------------------<  116<H>  4.1   |  29  |  1.14    Ca    9.4      13 Jun 2022 06:00    TPro  6.9  /  Alb  2.7<L>  /  TBili  0.6  /  DBili  x   /  AST  41<H>  /  ALT  118<H>  /  AlkPhos  405<H>  06-13              COVID-19 PCR: NotDetec (08 Jun 2022 21:00)  COVID-19 PCR: NotDetec (07 Jun 2022 10:35)  COVID-19 PCR: NotDetec (03 Jun 2022 07:32)  COVID-19 PCR: NotDetec (29 May 2022 10:41)      RADIOLOGY & ADDITIONAL TESTS:  Imaging from Last 24 Hours:    Electrocardiogram/QTc Interval:    COORDINATION OF CARE:  Care Discussed with Consultants/Other Providers:

## 2022-06-13 NOTE — PROGRESS NOTE ADULT - SUBJECTIVE AND OBJECTIVE BOX
SUBJECTIVE:  Pt seen and examined at bedside.  Rt leg suture removed, no issues  Some discomfort with Left knee immobilizer after a functional transfer yesterday  Heel boot on left heel peeling off  Last BM yesterday  Pain controlled on oxycodone IR only  Previously reported RUQ abd pain resolved, US unremarkable    ROS: Denies headache, dizziness, chest pain, dyspnea, abdominal pain, dysuria, diarrhea  Last BM today    Engaging in therapy, motivated  Labs--unremarkable    Liaison with other providers-consulted ortho  Await Ortho PA review of issue with immobilizer and heel boot    PHYSICAL EXAM:  Constitutional - NAD, Comfortable  HEENT - NAD  Neck - Supple, No limited ROM  Chest - Clear  Cardio - warm and well perfused, RRR, no murmur  Abdomen -  Soft, non tender  Ext-RLE--, LLE ace wrap and knee immobilizer in situ, heel peeling off from base    AAO x 3  MMT 5/5 except LLE limited by wt bearing status and pain  Able to wiggle toes  Sensation intact    RECENT LABS/IMAGING                        8.9    8.21  )-----------( 793      ( 13 Jun 2022 06:00 )             26.9     06-13    137  |  101  |  33<H>  ----------------------------<  116<H>  4.1   |  29  |  1.14    Ca    9.4      13 Jun 2022 06:00    6/11/22--US ABD for RUQ PAIN-  Normal right upper quadrant abdominal ultrasound.    TPro  6.9  /  Alb  2.7<L>  /  TBili  0.6  /  DBili  x   /  AST  41<H>  /  ALT  118<H>  /  AlkPhos  405<H>  06-13  MEDICATIONS  (STANDING):  apixaban 5 milliGRAM(s) Oral every 12 hours  lactulose Syrup 10 Gram(s) Oral daily  morphine ER Tablet 15 milliGRAM(s) Oral every 8 hours  pantoprazole    Tablet 40 milliGRAM(s) Oral before breakfast  polyethylene glycol 3350 17 Gram(s) Oral daily  senna 2 Tablet(s) Oral at bedtime    MEDICATIONS  (PRN):  bisacodyl Suppository 10 milliGRAM(s) Rectal daily PRN If no bowel movement  oxyCODONE    IR 5 milliGRAM(s) Oral every 6 hours PRN Moderate Pain (4 - 6)  oxyCODONE    IR 10 milliGRAM(s) Oral every 6 hours PRN Severe Pain (7 - 10)

## 2022-06-14 DIAGNOSIS — F51.05 INSOMNIA DUE TO OTHER MENTAL DISORDER: ICD-10-CM

## 2022-06-14 PROCEDURE — 99232 SBSQ HOSP IP/OBS MODERATE 35: CPT

## 2022-06-14 PROCEDURE — 99233 SBSQ HOSP IP/OBS HIGH 50: CPT

## 2022-06-14 PROCEDURE — 90792 PSYCH DIAG EVAL W/MED SRVCS: CPT

## 2022-06-14 RX ORDER — OXYCODONE HYDROCHLORIDE 5 MG/1
10 TABLET ORAL EVERY 6 HOURS
Refills: 0 | Status: DISCONTINUED | OUTPATIENT
Start: 2022-06-14 | End: 2022-06-18

## 2022-06-14 RX ORDER — OXYCODONE HYDROCHLORIDE 5 MG/1
5 TABLET ORAL EVERY 6 HOURS
Refills: 0 | Status: DISCONTINUED | OUTPATIENT
Start: 2022-06-14 | End: 2022-06-18

## 2022-06-14 RX ADMIN — APIXABAN 5 MILLIGRAM(S): 2.5 TABLET, FILM COATED ORAL at 17:21

## 2022-06-14 RX ADMIN — OXYCODONE HYDROCHLORIDE 10 MILLIGRAM(S): 5 TABLET ORAL at 04:49

## 2022-06-14 RX ADMIN — OXYCODONE HYDROCHLORIDE 10 MILLIGRAM(S): 5 TABLET ORAL at 10:22

## 2022-06-14 RX ADMIN — OXYCODONE HYDROCHLORIDE 10 MILLIGRAM(S): 5 TABLET ORAL at 23:06

## 2022-06-14 RX ADMIN — OXYCODONE HYDROCHLORIDE 10 MILLIGRAM(S): 5 TABLET ORAL at 17:21

## 2022-06-14 RX ADMIN — MORPHINE SULFATE 15 MILLIGRAM(S): 50 CAPSULE, EXTENDED RELEASE ORAL at 14:00

## 2022-06-14 RX ADMIN — SENNA PLUS 2 TABLET(S): 8.6 TABLET ORAL at 22:02

## 2022-06-14 RX ADMIN — MORPHINE SULFATE 15 MILLIGRAM(S): 50 CAPSULE, EXTENDED RELEASE ORAL at 06:09

## 2022-06-14 RX ADMIN — MORPHINE SULFATE 15 MILLIGRAM(S): 50 CAPSULE, EXTENDED RELEASE ORAL at 06:55

## 2022-06-14 RX ADMIN — MORPHINE SULFATE 15 MILLIGRAM(S): 50 CAPSULE, EXTENDED RELEASE ORAL at 13:00

## 2022-06-14 RX ADMIN — APIXABAN 5 MILLIGRAM(S): 2.5 TABLET, FILM COATED ORAL at 06:09

## 2022-06-14 RX ADMIN — POLYETHYLENE GLYCOL 3350 17 GRAM(S): 17 POWDER, FOR SOLUTION ORAL at 12:22

## 2022-06-14 RX ADMIN — OXYCODONE HYDROCHLORIDE 10 MILLIGRAM(S): 5 TABLET ORAL at 11:30

## 2022-06-14 RX ADMIN — MORPHINE SULFATE 15 MILLIGRAM(S): 50 CAPSULE, EXTENDED RELEASE ORAL at 22:45

## 2022-06-14 RX ADMIN — PANTOPRAZOLE SODIUM 40 MILLIGRAM(S): 20 TABLET, DELAYED RELEASE ORAL at 06:09

## 2022-06-14 RX ADMIN — OXYCODONE HYDROCHLORIDE 10 MILLIGRAM(S): 5 TABLET ORAL at 04:03

## 2022-06-14 RX ADMIN — MORPHINE SULFATE 15 MILLIGRAM(S): 50 CAPSULE, EXTENDED RELEASE ORAL at 22:00
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
## 2022-06-14 NOTE — BH CONSULTATION LIAISON ASSESSMENT NOTE - CURRENT MEDICATION
MEDICATIONS  (STANDING):  apixaban 5 milliGRAM(s) Oral every 12 hours  lactulose Syrup 10 Gram(s) Oral daily  morphine ER Tablet 15 milliGRAM(s) Oral every 8 hours  pantoprazole    Tablet 40 milliGRAM(s) Oral before breakfast  polyethylene glycol 3350 17 Gram(s) Oral daily  senna 2 Tablet(s) Oral at bedtime    MEDICATIONS  (PRN):  bisacodyl Suppository 10 milliGRAM(s) Rectal daily PRN If no bowel movement  oxyCODONE    IR 5 milliGRAM(s) Oral every 6 hours PRN Moderate Pain (4 - 6)  oxyCODONE    IR 10 milliGRAM(s) Oral every 6 hours PRN Severe Pain (7 - 10)

## 2022-06-14 NOTE — PROGRESS NOTE ADULT - ATTENDING COMMENTS
Seen with Dr Melo, rehab resident    Left Tib/Fibula fracture s/p ORIF  NWB LLE    Pain controlled  Engaging well in therapy  Awaiting response from ortho regarding the Left leg orthotic device     Continue PT/OT  Acute rehab still needed to address gait. mobility, balance and transfers

## 2022-06-14 NOTE — BH CONSULTATION LIAISON ASSESSMENT NOTE - GENERAL APPEARANCE
At time of interview, patient is sitting in wheelchair in street clothes. Pt appears stated age & is well nourished./No deformities present

## 2022-06-14 NOTE — BH CONSULTATION LIAISON ASSESSMENT NOTE - NSBHCONSULTRECOMMENDOTHER_PSY_A_CORE FT
- Clonopin PRN at bedtime for sleep aid  - Consider initiating Zoloft to manage acute stress reaction  - Consider initiation of xanax for discharge to alleviate stress regarding drive home for rehab center  Consider initiating Zoloft to manage acute stress reaction which can very well turn into PTSD, can start Zoloft 25mg PO daily and titrate upward as tolerated.     - Consider PRN of xanax 0.25mg PO x1 prior to discharge to alleviate anxiety regarding drive home for rehab center

## 2022-06-14 NOTE — BH CONSULTATION LIAISON ASSESSMENT NOTE - OTHER PAST PSYCHIATRIC HISTORY (INCLUDE DETAILS REGARDING ONSET, COURSE OF ILLNESS, INPATIENT/OUTPATIENT TREATMENT)
Pt previously treated by outpatient psychiatrist for Anxiety  Pt previously treated by outpatient psychiatrist for Anxiety, was on Lamictal "years ago".  Denied previous inpatient stays.

## 2022-06-14 NOTE — BH CONSULTATION LIAISON ASSESSMENT NOTE - NSBHCHARTREVIEWVS_PSY_A_CORE FT
Vital Signs Last 24 Hrs  T(C): 36.8 (14 Jun 2022 08:00), Max: 37 (13 Jun 2022 19:25)  T(F): 98.2 (14 Jun 2022 08:00), Max: 98.6 (13 Jun 2022 19:25)  HR: 84 (14 Jun 2022 08:00) (84 - 95)  BP: 107/75 (14 Jun 2022 08:00) (107/75 - 114/73)  BP(mean): --  RR: 16 (14 Jun 2022 08:00) (16 - 16)  SpO2: 98% (14 Jun 2022 08:00) (96% - 98%)

## 2022-06-14 NOTE — BH CONSULTATION LIAISON ASSESSMENT NOTE - NSBHCHARTREVIEWINVESTIGATE_PSY_A_CORE FT
< from: 12 Lead ECG (05.30.22 @ 08:23) >  Ventricular Rate 66 BPM  Atrial Rate 66 BPM  P-R Interval 130 ms  QRS Duration 88 ms  Q-T Interval 384 ms  QTC Calculation(Bazett) 402 ms  P Axis 36 degrees  R Axis 37 degrees  T Axis 21 degrees  Diagnosis Line NORMAL SINUS RHYTHMWITH SINUS ARRHYTHMIA  NORMAL ECG  WHEN COMPARED WITH ECG OF 25-MAY-2014  NO SIGNIFICANT CHANGE WAS FOUND  Confirmed by JACLYN PHOENIX MD ([id number]) on 5/30/2022 1:12:43 PM  < end of copied text

## 2022-06-14 NOTE — BH CONSULTATION LIAISON ASSESSMENT NOTE - NSBHCHARTREVIEWLAB_PSY_A_CORE FT
8.9    8.21  )-----------( 793      ( 13 Jun 2022 06:00 )             26.9   06-13    137  |  101  |  33<H>  ----------------------------<  116<H>  4.1   |  29  |  1.14    Ca    9.4      13 Jun 2022 06:00    TPro  6.9  /  Alb  2.7<L>  /  TBili  0.6  /  DBili  x   /  AST  41<H>  /  ALT  118<H>  /  AlkPhos  405<H>  06-13
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
## 2022-06-14 NOTE — BH CONSULTATION LIAISON ASSESSMENT NOTE - SELF INJURIOUS BEHAVIOR WITHOUT SUICIDAL INTENT:
Patient returned phone call. Patient is scheduled for a medicare wellness exam on 2/7/18   None known

## 2022-06-14 NOTE — BH CONSULTATION LIAISON ASSESSMENT NOTE - SUMMARY
Pt is a 57 year old male who presents to [facility] for acute rehab services. Pt is s/p irrigation and debridement and ORIF on 6/4.   Psychiatry consulted to evaluate for anxiety, depression, nightmares, angry behavior, flashbacks, worrisome thoughts and feeling fatigued.   See HPI for details regarding the initial interview.  See recommendations below.

## 2022-06-14 NOTE — BH CONSULTATION LIAISON ASSESSMENT NOTE - NSSUICPROTFACT_PSY_ALL_CORE
Responsibility to children, family, or others/Identifies reasons for living/Supportive social network of family or friends/Cultural, spiritual and/or moral attitudes against suicide/Engaged in work or school/Ability to cope with stress

## 2022-06-14 NOTE — BH CONSULTATION LIAISON ASSESSMENT NOTE - NSICDXBHSECONDARYDX_PSY_ALL_CORE
Fracture of left tibia and fibula   S82.202A  Deep vein thrombosis (DVT) of femoral vein of left lower extremity, unspecified chronicity   I82.412  Insomnia due to anxiety and fear   F51.05

## 2022-06-14 NOTE — PROGRESS NOTE ADULT - SUBJECTIVE AND OBJECTIVE BOX
SUBJECTIVE:  Pt seen and examined at bedside.  No additional reports of RUQ pain.  Await Ortho PA review of issue with immobilizer and heel boot    Labs 6/13- Cr elevated, monitor. ALP elevated- RUQ US normal    ROS: No reports of headache, dizziness, chest pain, dyspnea, abdominal pain, dysuria, diarrhea  Last BM ____    Vital Signs Last 24 Hrs  T(C): 37 (13 Jun 2022 19:25), Max: 37 (13 Jun 2022 19:25)  T(F): 98.6 (13 Jun 2022 19:25), Max: 98.6 (13 Jun 2022 19:25)  HR: 95 (13 Jun 2022 19:25) (95 - 95)  BP: 114/73 (13 Jun 2022 19:25) (114/73 - 114/73)  RR: 16 (13 Jun 2022 19:25) (16 - 16)  SpO2: 96% (13 Jun 2022 19:25) (96% - 96%)    PHYSICAL EXAM:  Constitutional - NAD, Comfortable  HEENT - NAD  Neck - Supple, No limited ROM  Chest - Clear  Cardio - warm and well perfused, RRR, no murmur  Abdomen -  Soft, non tender  Ext-RLE--, LLE ace wrap and knee immobilizer in situ, heel peeling off from base    AAO x 3  MMT 5/5 except LLE limited by wt bearing status and pain  Able to wiggle toes  Sensation intact    RECENT LABS/IMAGING    06-13    137  |  101  |  33<H>  ----------------------------<  116<H>  4.1   |  29  |  1.14    Ca    9.4      13 Jun 2022 06:00    TPro  6.9  /  Alb  2.7<L>  /  TBili  0.6  /  DBili  x   /  AST  41<H>  /  ALT  118<H>  /  AlkPhos  405<H>  06-13                            8.9    8.21  )-----------( 793      ( 13 Jun 2022 06:00 )             26.9     TPro  6.9  /  Alb  2.7<L>  /  TBili  0.6  /  DBili  x   /  AST  41<H>  /  ALT  118<H>  /  AlkPhos  405<H>  06-13    CAPILLARY BLOOD GLUCOSE    6/11/22--US ABD for RUQ PAIN-  Normal right upper quadrant abdominal ultrasound.    MEDICATIONS  (STANDING):  apixaban 5 milliGRAM(s) Oral every 12 hours  lactulose Syrup 10 Gram(s) Oral daily  morphine ER Tablet 15 milliGRAM(s) Oral every 8 hours  pantoprazole    Tablet 40 milliGRAM(s) Oral before breakfast  polyethylene glycol 3350 17 Gram(s) Oral daily  senna 2 Tablet(s) Oral at bedtime    MEDICATIONS  (PRN):  bisacodyl Suppository 10 milliGRAM(s) Rectal daily PRN If no bowel movement  oxyCODONE    IR 5 milliGRAM(s) Oral every 6 hours PRN Moderate Pain (4 - 6)  oxyCODONE    IR 10 milliGRAM(s) Oral every 6 hours PRN Severe Pain (7 - 10)         SUBJECTIVE:  Pt seen and examined at bedside.  No additional reports of RUQ pain.  Await Ortho PA review of issue with immobilizer and heel boot  Pain controlled on current regimen    Labs 6/13- Cr elevated, monitor. ALP elevated- RUQ US normal    ROS: No reports of headache, dizziness, chest pain, dyspnea, abdominal pain, dysuria, diarrhea  Last BM 6/13    Vital Signs Last 24 Hrs  T(C): 37 (13 Jun 2022 19:25), Max: 37 (13 Jun 2022 19:25)  T(F): 98.6 (13 Jun 2022 19:25), Max: 98.6 (13 Jun 2022 19:25)  HR: 95 (13 Jun 2022 19:25) (95 - 95)  BP: 114/73 (13 Jun 2022 19:25) (114/73 - 114/73)  RR: 16 (13 Jun 2022 19:25) (16 - 16)  SpO2: 96% (13 Jun 2022 19:25) (96% - 96%)    PHYSICAL EXAM:  Constitutional - NAD, Comfortable  HEENT - NAD  Neck - Supple, No limited ROM  Chest - Clear  Cardio - warm and well perfused, RRR, no murmur  Abdomen -  Soft, non tender  Ext-RLE--, LLE ace wrap and knee immobilizer in situ, heel peeling off from base    AAO x 3  MMT 5/5 except LLE limited by wt bearing status and pain  Able to wiggle toes  Sensation intact    RECENT LABS/IMAGING    06-13    137  |  101  |  33<H>  ----------------------------<  116<H>  4.1   |  29  |  1.14    Ca    9.4      13 Jun 2022 06:00    TPro  6.9  /  Alb  2.7<L>  /  TBili  0.6  /  DBili  x   /  AST  41<H>  /  ALT  118<H>  /  AlkPhos  405<H>  06-13                            8.9    8.21  )-----------( 793      ( 13 Jun 2022 06:00 )             26.9     TPro  6.9  /  Alb  2.7<L>  /  TBili  0.6  /  DBili  x   /  AST  41<H>  /  ALT  118<H>  /  AlkPhos  405<H>  06-13    CAPILLARY BLOOD GLUCOSE    6/11/22--US ABD for RUQ PAIN-  Normal right upper quadrant abdominal ultrasound.    MEDICATIONS  (STANDING):  apixaban 5 milliGRAM(s) Oral every 12 hours  lactulose Syrup 10 Gram(s) Oral daily  morphine ER Tablet 15 milliGRAM(s) Oral every 8 hours  pantoprazole    Tablet 40 milliGRAM(s) Oral before breakfast  polyethylene glycol 3350 17 Gram(s) Oral daily  senna 2 Tablet(s) Oral at bedtime    MEDICATIONS  (PRN):  bisacodyl Suppository 10 milliGRAM(s) Rectal daily PRN If no bowel movement  oxyCODONE    IR 5 milliGRAM(s) Oral every 6 hours PRN Moderate Pain (4 - 6)  oxyCODONE    IR 10 milliGRAM(s) Oral every 6 hours PRN Severe Pain (7 - 10)         SUBJECTIVE:  Pt seen and examined at bedside.  No additional reports of RUQ pain.  Await Ortho PA review of issue with immobilizer and heel boot  Pain controlled on current regimen    Labs 6/13- Cr elevated, monitor. ALP elevated- RUQ US normal    ROS: No reports of headache, dizziness, chest pain, dyspnea, abdominal pain, dysuria, diarrhea  Last BM 6/14    Vital Signs Last 24 Hrs  T(C): 37 (13 Jun 2022 19:25), Max: 37 (13 Jun 2022 19:25)  T(F): 98.6 (13 Jun 2022 19:25), Max: 98.6 (13 Jun 2022 19:25)  HR: 95 (13 Jun 2022 19:25) (95 - 95)  BP: 114/73 (13 Jun 2022 19:25) (114/73 - 114/73)  RR: 16 (13 Jun 2022 19:25) (16 - 16)  SpO2: 96% (13 Jun 2022 19:25) (96% - 96%)    PHYSICAL EXAM:  Constitutional - NAD, Comfortable  HEENT - NAD  Neck - Supple, No limited ROM  Chest - Clear  Cardio - warm and well perfused, RRR, no murmur  Abdomen -  Soft, non tender  Ext-RLE--, LLE ace wrap and knee immobilizer in situ, heel peeling off from base    AAO x 3  MMT 5/5 except LLE limited by wt bearing status and pain  Able to wiggle toes  Sensation intact    RECENT LABS/IMAGING    06-13    137  |  101  |  33<H>  ----------------------------<  116<H>  4.1   |  29  |  1.14    Ca    9.4      13 Jun 2022 06:00    TPro  6.9  /  Alb  2.7<L>  /  TBili  0.6  /  DBili  x   /  AST  41<H>  /  ALT  118<H>  /  AlkPhos  405<H>  06-13                            8.9    8.21  )-----------( 793      ( 13 Jun 2022 06:00 )             26.9     TPro  6.9  /  Alb  2.7<L>  /  TBili  0.6  /  DBili  x   /  AST  41<H>  /  ALT  118<H>  /  AlkPhos  405<H>  06-13    CAPILLARY BLOOD GLUCOSE    6/11/22--US ABD for RUQ PAIN-  Normal right upper quadrant abdominal ultrasound.    MEDICATIONS  (STANDING):  apixaban 5 milliGRAM(s) Oral every 12 hours  lactulose Syrup 10 Gram(s) Oral daily  morphine ER Tablet 15 milliGRAM(s) Oral every 8 hours  pantoprazole    Tablet 40 milliGRAM(s) Oral before breakfast  polyethylene glycol 3350 17 Gram(s) Oral daily  senna 2 Tablet(s) Oral at bedtime    MEDICATIONS  (PRN):  bisacodyl Suppository 10 milliGRAM(s) Rectal daily PRN If no bowel movement  oxyCODONE    IR 5 milliGRAM(s) Oral every 6 hours PRN Moderate Pain (4 - 6)  oxyCODONE    IR 10 milliGRAM(s) Oral every 6 hours PRN Severe Pain (7 - 10)

## 2022-06-14 NOTE — BH CONSULTATION LIAISON ASSESSMENT NOTE - NSCOMMENTSUICRISKFACT_PSY_ALL_CORE
Pt states he was treated in the past for anxiety with Lamictal but has "not taken that for a few years"

## 2022-06-14 NOTE — CHART NOTE - NSCHARTNOTEFT_GEN_A_CORE
Nutrition Follow Up Note  Hospital Course   (Per Electronic Medical Record)    Source:  Patient [X]  Medical Record [X]      Diet:   Regular Diet (IDDSI Level 7) w/ Thin Liquids (IDDSI Level 0)  Tolerates Diet Consistency Well  No Chewing/Swallowing Difficulties  No Recent Nausea, Vomiting, Diarrhea or Constipation (as Per Patient)  Consumes % of Meals (as Per Documentation) - States Good PO Intake/Appetite     Enteral/Parenteral Nutrition: Not Applicable    Current Weight: 198.3lb on 6/11  Weights Currently Stable @This Time  Obtain Weights Weekly     Pertinent Medications: MEDICATIONS  (STANDING):  apixaban 5 milliGRAM(s) Oral every 12 hours  lactulose Syrup 10 Gram(s) Oral daily  morphine ER Tablet 15 milliGRAM(s) Oral every 8 hours  pantoprazole    Tablet 40 milliGRAM(s) Oral before breakfast  polyethylene glycol 3350 17 Gram(s) Oral daily  senna 2 Tablet(s) Oral at bedtime    MEDICATIONS  (PRN):  bisacodyl Suppository 10 milliGRAM(s) Rectal daily PRN If no bowel movement  oxyCODONE    IR 5 milliGRAM(s) Oral every 6 hours PRN Moderate Pain (4 - 6)  oxyCODONE    IR 10 milliGRAM(s) Oral every 6 hours PRN Severe Pain (7 - 10)    Pertinent Labs:  06-13 Na137 mmol/L Glu 116 mg/dL<H> K+ 4.1 mmol/L Cr  1.14 mg/dL BUN 33 mg/dL<H> 06-13 Alb 2.7 g/dL<L>    Skin: No Pressure Ulcers  Multiple Surgical Incisions  (as Per Nursing Flow Sheet)     Edema: None Noted (as Per Documentation)     Last Bowel Movement: on 6/12    Estimated Needs:   [X] No Change Since Previous Assessment    Previous Nutrition Diagnosis:   No Active Nutrition Dx @ This Present Time    Nutrition Diagnosis is [X] Not Applicable     New Nutrition Diagnosis: [X] Not Applicable    Interventions:   1. Recommend Continue Nutrition Plan of Care     Monitoring & Evaluation:   [X] Weights   [X] PO Intake   [X] Skin Integrity   [X] Follow Up (Per Protocol)  [X] Tolerance to Diet Prescription   [X] Other: Labs     Registered Dietitian/Nutritionist Remains Available.  Zac Almanza RDN    Pager #[phone]  Phone# [phone]
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
## 2022-06-14 NOTE — PROGRESS NOTE ADULT - SUBJECTIVE AND OBJECTIVE BOX
Medicine Progress Note    Patient is a 57y old  Male who presents with a chief complaint of Left tib/fib fracture (12 Jun 2022 13:02)      SUBJECTIVE / OVERNIGHT EVENTS:  seen and examined  Chart reviewed  No overnight events  Limb weakness improving with therapy  BM+  LLE pain controlled with meds.     ADDITIONAL REVIEW OF SYSTEMS:  no fever/chills/CP/sob/palpitation/dizziness/ abd pain/nausea/vomiting/diarrhea/constipation/headaches. all other ROS neg    MEDICATIONS  (STANDING):  apixaban 5 milliGRAM(s) Oral every 12 hours  lactulose Syrup 10 Gram(s) Oral daily  morphine ER Tablet 15 milliGRAM(s) Oral every 8 hours  pantoprazole    Tablet 40 milliGRAM(s) Oral before breakfast  polyethylene glycol 3350 17 Gram(s) Oral daily  senna 2 Tablet(s) Oral at bedtime    MEDICATIONS  (PRN):  bisacodyl Suppository 10 milliGRAM(s) Rectal daily PRN If no bowel movement  oxyCODONE    IR 5 milliGRAM(s) Oral every 6 hours PRN Moderate Pain (4 - 6)  oxyCODONE    IR 10 milliGRAM(s) Oral every 6 hours PRN Severe Pain (7 - 10)      PHYSICAL EXAM:    Vital Signs Last 24 Hrs  T(C): 36.8 (14 Jun 2022 08:00), Max: 37 (13 Jun 2022 19:25)  T(F): 98.2 (14 Jun 2022 08:00), Max: 98.6 (13 Jun 2022 19:25)  HR: 84 (14 Jun 2022 08:00) (84 - 95)  BP: 107/75 (14 Jun 2022 08:00) (107/75 - 114/73)  BP(mean): --  RR: 16 (14 Jun 2022 08:00) (16 - 16)  SpO2: 98% (14 Jun 2022 08:00) (96% - 98%)      GENERAL: Not in distress. Alert    HEENT: clear conjuctiva, MMM. no pallor or icterus  CARDIOVASCULAR: RRR S1, S2. No murmur/rubs/gallop  LUNGS: BLAE+, no rales, no wheezing, no rhonchi.    ABDOMEN: ND. Soft,  NT, no guarding / rebound / rigidity. BS normoactive  BACK: No spine tenderness.  EXTREMITIES: no edema. LLE in ace wrap and cast and denice boot which is displaced. PP+. no TP on RLE  SKIN: warm and dry. no cellulitis on exposed skin. healed scab RLE  PSYCHIATRIC: Calm.  No agitation.    LABS:                        8.9    8.21  )-----------( 793      ( 13 Jun 2022 06:00 )             26.9     06-13    137  |  101  |  33<H>  ----------------------------<  116<H>  4.1   |  29  |  1.14    Ca    9.4      13 Jun 2022 06:00    TPro  6.9  /  Alb  2.7<L>  /  TBili  0.6  /  DBili  x   /  AST  41<H>  /  ALT  118<H>  /  AlkPhos  405<H>  06-13              COVID-19 PCR: NotDetec (08 Jun 2022 21:00)  COVID-19 PCR: NotDetec (07 Jun 2022 10:35)  COVID-19 PCR: NotDetec (03 Jun 2022 07:32)  COVID-19 PCR: NotDetec (29 May 2022 10:41)      RADIOLOGY & ADDITIONAL TESTS:  Imaging from Last 24 Hours:    Electrocardiogram/QTc Interval:    COORDINATION OF CARE:  Care Discussed with Consultants/Other Providers:
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
## 2022-06-14 NOTE — BH CONSULTATION LIAISON ASSESSMENT NOTE - DESCRIPTION
57 year old male currently residing in [place], [place] with wife. 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 Pt states he has a blended family with current wife -  2 sons from a previous marriage & 1 step-daughter. Pt denies history of tobacco use but reports he will occasionally have a cigar. Reports no history of illicit drug use. Drinks 1 glass of wine or beer a night.

## 2022-06-14 NOTE — BH CONSULTATION LIAISON ASSESSMENT NOTE - NSBHCONSULTFOLLOWAFTERCARE_PSY_A_CORE FT
- continue to see outpatient psychologist   - follow-up with outpatient psychiatry for medication management of ASD
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
## 2022-06-15 ENCOUNTER — TRANSCRIPTION ENCOUNTER (OUTPATIENT)
Age: 57
End: 2022-06-15

## 2022-06-15 LAB — SARS-COV-2 RNA SPEC QL NAA+PROBE: SIGNIFICANT CHANGE UP

## 2022-06-15 PROCEDURE — 99232 SBSQ HOSP IP/OBS MODERATE 35: CPT

## 2022-06-15 PROCEDURE — 99233 SBSQ HOSP IP/OBS HIGH 50: CPT

## 2022-06-15 RX ORDER — FINASTERIDE 5 MG/1
1 TABLET, FILM COATED ORAL
Qty: 30 | Refills: 0
Start: 2022-06-15 | End: 2022-07-14

## 2022-06-15 RX ORDER — SENNA PLUS 8.6 MG/1
2 TABLET ORAL
Qty: 0 | Refills: 0 | DISCHARGE
Start: 2022-06-15

## 2022-06-15 RX ORDER — APIXABAN 2.5 MG/1
1 TABLET, FILM COATED ORAL
Qty: 0 | Refills: 0 | DISCHARGE
Start: 2022-06-15

## 2022-06-15 RX ORDER — SERTRALINE 25 MG/1
1 TABLET, FILM COATED ORAL
Qty: 14 | Refills: 0
Start: 2022-06-15 | End: 2022-07-31

## 2022-06-15 RX ORDER — TAMSULOSIN HYDROCHLORIDE 0.4 MG/1
1 CAPSULE ORAL
Qty: 30 | Refills: 0
Start: 2022-06-15 | End: 2022-07-14

## 2022-06-15 RX ORDER — SERTRALINE 25 MG/1
1 TABLET, FILM COATED ORAL
Qty: 30 | Refills: 0
Start: 2022-06-15 | End: 2022-07-14

## 2022-06-15 RX ORDER — LACTULOSE 10 G/15ML
15 SOLUTION ORAL
Qty: 0 | Refills: 0 | DISCHARGE
Start: 2022-06-15

## 2022-06-15 RX ORDER — APIXABAN 2.5 MG/1
1 TABLET, FILM COATED ORAL
Qty: 60 | Refills: 0
Start: 2022-06-15 | End: 2022-07-14

## 2022-06-15 RX ORDER — SERTRALINE 25 MG/1
1 TABLET, FILM COATED ORAL
Qty: 0 | Refills: 0 | DISCHARGE
Start: 2022-06-15

## 2022-06-15 RX ORDER — FOLIC ACID 0.8 MG
1 TABLET ORAL
Qty: 30 | Refills: 0
Start: 2022-06-15 | End: 2022-07-14

## 2022-06-15 RX ORDER — PANTOPRAZOLE SODIUM 20 MG/1
1 TABLET, DELAYED RELEASE ORAL
Qty: 0 | Refills: 0 | DISCHARGE
Start: 2022-06-15

## 2022-06-15 RX ORDER — FOLIC ACID 0.8 MG
1 TABLET ORAL
Qty: 14 | Refills: 0
Start: 2022-06-15 | End: 2022-07-31

## 2022-06-15 RX ORDER — SERTRALINE 25 MG/1
25 TABLET, FILM COATED ORAL
Refills: 0 | Status: DISCONTINUED | OUTPATIENT
Start: 2022-06-15 | End: 2022-06-18

## 2022-06-15 RX ORDER — PANTOPRAZOLE SODIUM 20 MG/1
1 TABLET, DELAYED RELEASE ORAL
Qty: 30 | Refills: 0
Start: 2022-06-15 | End: 2022-07-14

## 2022-06-15 RX ORDER — LACTULOSE 10 G/15ML
15 SOLUTION ORAL
Qty: 450 | Refills: 0
Start: 2022-06-15 | End: 2022-07-14

## 2022-06-15 RX ORDER — OXYCODONE HYDROCHLORIDE 5 MG/1
1 TABLET ORAL
Qty: 0 | Refills: 0 | DISCHARGE
Start: 2022-06-15

## 2022-06-15 RX ORDER — POLYETHYLENE GLYCOL 3350 17 G/17G
17 POWDER, FOR SOLUTION ORAL
Qty: 0 | Refills: 0 | DISCHARGE
Start: 2022-06-15

## 2022-06-15 RX ADMIN — MORPHINE SULFATE 15 MILLIGRAM(S): 50 CAPSULE, EXTENDED RELEASE ORAL at 07:05

## 2022-06-15 RX ADMIN — APIXABAN 5 MILLIGRAM(S): 2.5 TABLET, FILM COATED ORAL at 06:13

## 2022-06-15 RX ADMIN — MORPHINE SULFATE 15 MILLIGRAM(S): 50 CAPSULE, EXTENDED RELEASE ORAL at 13:06

## 2022-06-15 RX ADMIN — PANTOPRAZOLE SODIUM 40 MILLIGRAM(S): 20 TABLET, DELAYED RELEASE ORAL at 06:14

## 2022-06-15 RX ADMIN — MORPHINE SULFATE 15 MILLIGRAM(S): 50 CAPSULE, EXTENDED RELEASE ORAL at 22:00

## 2022-06-15 RX ADMIN — OXYCODONE HYDROCHLORIDE 10 MILLIGRAM(S): 5 TABLET ORAL at 00:00

## 2022-06-15 RX ADMIN — MORPHINE SULFATE 15 MILLIGRAM(S): 50 CAPSULE, EXTENDED RELEASE ORAL at 21:10

## 2022-06-15 RX ADMIN — MORPHINE SULFATE 15 MILLIGRAM(S): 50 CAPSULE, EXTENDED RELEASE ORAL at 06:14

## 2022-06-15 RX ADMIN — APIXABAN 5 MILLIGRAM(S): 2.5 TABLET, FILM COATED ORAL at 18:12

## 2022-06-15 RX ADMIN — OXYCODONE HYDROCHLORIDE 10 MILLIGRAM(S): 5 TABLET ORAL at 19:01

## 2022-06-15 RX ADMIN — MORPHINE SULFATE 15 MILLIGRAM(S): 50 CAPSULE, EXTENDED RELEASE ORAL at 14:00

## 2022-06-15 RX ADMIN — OXYCODONE HYDROCHLORIDE 10 MILLIGRAM(S): 5 TABLET ORAL at 18:12

## 2022-06-15 RX ADMIN — POLYETHYLENE GLYCOL 3350 17 GRAM(S): 17 POWDER, FOR SOLUTION ORAL at 11:55

## 2022-06-15 RX ADMIN — SERTRALINE 25 MILLIGRAM(S): 25 TABLET, FILM COATED ORAL at 18:12

## 2022-06-15 RX ADMIN — OXYCODONE HYDROCHLORIDE 10 MILLIGRAM(S): 5 TABLET ORAL at 09:50

## 2022-06-15 RX ADMIN — OXYCODONE HYDROCHLORIDE 10 MILLIGRAM(S): 5 TABLET ORAL at 08:51

## 2022-06-15 NOTE — BH CONSULTATION LIAISON PROGRESS NOTE - NSBHCONSULTRECOMMENDOTHER_PSY_A_CORE FT
Consider initiating Zoloft to manage acute stress reaction which can very well turn into PTSD, can start Zoloft 25mg PO daily and titrate upward as tolerated.     - Consider PRN of xanax 0.25mg PO x1 prior to discharge to alleviate anxiety regarding drive home for rehab center

## 2022-06-15 NOTE — PROGRESS NOTE ADULT - SUBJECTIVE AND OBJECTIVE BOX
SUBJECTIVE:  Pt seen and examined at bedside.  No acute med complaint, previous discomfort with LLE knee immobilizer is minimal  LLE --appears to have carbon fiber with the boot  Await Ortho f/u    Pain controlled on current regimen    Labs 6/13- Cr elevated, monitor. ALP elevated- RUQ US normal    ROS: No reports of headache, dizziness, chest pain, dyspnea, abdominal pain, dysuria, diarrhea  Last BM 6/15    Vital Signs Last 24 Hrs  T(C): 36.9 (15 Boni 2022 08:32), Max: 37.2 (14 Jun 2022 21:54)  T(F): 98.5 (15 Boni 2022 08:32), Max: 98.9 (14 Jun 2022 21:54)  HR: 100 (15 Boni 2022 08:32) (80 - 100)  BP: 106/67 (15 Boni 2022 08:32) (106/67 - 115/78)  RR: 16 (15 Boni 2022 08:32) (14 - 16)  SpO2: 99% (15 Boni 2022 08:32) (98% - 99%)    PHYSICAL EXAM:  Constitutional - NAD, Comfortable  HEENT - NAD  Neck - Supple, No limited ROM  Chest - Clear  Cardio - warm and well perfused, RRR, no murmur  Abdomen -  Soft, non tender  Ext-RLE--, LLE ace wrap and knee immobilizer in situ, immobilizer upper end opened, whole left leg examined, ace wrap, dry, no bleeding, no acute pain  Left carbon fiber on left foot    AAO x 3  MMT 5/5 except LLE limited by wt bearing status and pain  Able to wiggle toes  Sensation intact    RECENT LABS/IMAGING    06-13    137  |  101  |  33<H>  ----------------------------<  116<H>  4.1   |  29  |  1.14    Ca    9.4      13 Jun 2022 06:00    TPro  6.9  /  Alb  2.7<L>  /  TBili  0.6  /  DBili  x   /  AST  41<H>  /  ALT  118<H>  /  AlkPhos  405<H>  06-13                            8.9    8.21  )-----------( 793      ( 13 Jun 2022 06:00 )             26.9     TPro  6.9  /  Alb  2.7<L>  /  TBili  0.6  /  DBili  x   /  AST  41<H>  /  ALT  118<H>  /  AlkPhos  405<H>  06-13    CAPILLARY BLOOD GLUCOSE    6/11/22--US ABD for RUQ PAIN-  Normal right upper quadrant abdominal ultrasound.  MEDICATIONS  (STANDING):  apixaban 5 milliGRAM(s) Oral every 12 hours  lactulose Syrup 10 Gram(s) Oral daily  morphine ER Tablet 15 milliGRAM(s) Oral every 8 hours  pantoprazole    Tablet 40 milliGRAM(s) Oral before breakfast  polyethylene glycol 3350 17 Gram(s) Oral daily  senna 2 Tablet(s) Oral at bedtime  sertraline 25 milliGRAM(s) Oral <User Schedule>    MEDICATIONS  (PRN):  bisacodyl Suppository 10 milliGRAM(s) Rectal daily PRN If no bowel movement  oxyCODONE    IR 5 milliGRAM(s) Oral every 6 hours PRN Moderate Pain (4 - 6)  oxyCODONE    IR 10 milliGRAM(s) Oral every 6 hours PRN Severe Pain (7 - 10)

## 2022-06-15 NOTE — BH CONSULTATION LIAISON PROGRESS NOTE - NSBHCONSULTFOLLOWAFTERCARE_PSY_A_CORE FT
- continue to see outpatient psychologist   - follow-up with outpatient psychiatry for medication management of ASD  - pt can be referred to [facility] outpatient clinic [phone]
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
## 2022-06-15 NOTE — BH CONSULTATION LIAISON PROGRESS NOTE - NSBHCHARTREVIEWLAB_PSY_A_CORE FT
Complete Blood Count (06.13.22 @ 06:00)    Nucleated RBC: 0 /100 WBCs    WBC Count: 8.21 K/uL    RBC Count: 2.80 M/uL    Hemoglobin: 8.9 g/dL    Hematocrit: 26.9 %    Mean Cell Volume: 96.1 fl    Mean Cell Hemoglobin: 31.8 pg    Mean Cell Hemoglobin Conc: 33.1 gm/dL    Red Cell Distrib Width: 12.8 %    Platelet Count - Automated: 793 K/uL  Comprehensive Metabolic Panel (06.13.22 @ 06:00)    Sodium, Serum: 137 mmol/L    Potassium, Serum: 4.1 mmol/L    Chloride, Serum: 101 mmol/L    Carbon Dioxide, Serum: 29 mmol/L    Anion Gap, Serum: 7 mmol/L    Blood Urea Nitrogen, Serum: 33 mg/dL    Creatinine, Serum: 1.14 mg/dL    Glucose, Serum: 116 mg/dL    Calcium, Total Serum: 9.4 mg/dL    Protein Total, Serum: 6.9 g/dL    Albumin, Serum: 2.7 g/dL    Bilirubin Total, Serum: 0.6 mg/dL    Alkaline Phosphatase, Serum: 405 U/L    Aspartate Aminotransferase (AST/SGOT): 41 U/L    Alanine Aminotransferase (ALT/SGPT): 118 U/L    eGFR: 75: The estimated glomerular filtration rate (eGFR) is calculated using the  2021 CKD-EPI creatinine equation, which does not have a coefficient for  race and is validated in individuals 18 years of age and older (N Engl J  Med 2021; 385:2348-5238). Creatinine-based eGFR may be inaccurate in  various situations including but not limited to extremes of muscle mass,  altered dietary protein intake, or medications that affect renal tubular  creatinine secretion. mL/min/1.73m2

## 2022-06-15 NOTE — BH CONSULTATION LIAISON PROGRESS NOTE - NSBHCHARTREVIEWINVESTIGATE_PSY_A_CORE FT
< from: 12 Lead ECG (05.30.22 @ 08:23) >  Ventricular Rate 66 BPM  Atrial Rate 66 BPM  P-R Interval 130 ms  QRS Duration 88 ms  Q-T Interval 384 ms  QTC Calculation(Bazett) 402 ms  P Axis 36 degrees  R Axis 37 degrees  T Axis 21 degrees  Diagnosis Line NORMAL SINUS RHYTHMWITH SINUS ARRHYTHMIA  NORMAL ECG  WHEN COMPARED WITH ECG OF 25-MAY-2014  NO SIGNIFICANT CHANGE WAS FOUND  Confirmed by JACLYN PHOENIX MD ([id number]) on 5/30/2022 1:12:43 PM  < end of copied text >
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
## 2022-06-15 NOTE — DISCHARGE NOTE PROVIDER - NSDCFUSCHEDAPPT_GEN_ALL_CORE_FT
Tulio Ivory  [facility]  GC PreAdmits  Scheduled Appointment: 06/08/2022     Thompson Low  [facility]  ORTHOSURG [address] [facility] [facility]  Scheduled Appointment: 06/20/2022

## 2022-06-15 NOTE — BH CONSULTATION LIAISON PROGRESS NOTE - NSBHCHARTREVIEWVS_PSY_A_CORE FT
Vital Signs Last 24 Hrs  T(C): 36.9 (15 Boni 2022 08:32), Max: 37.2 (14 Jun 2022 21:54)  T(F): 98.5 (15 Boni 2022 08:32), Max: 98.9 (14 Jun 2022 21:54)  HR: 100 (15 Boni 2022 08:32) (80 - 100)  BP: 106/67 (15 Boni 2022 08:32) (106/67 - 115/78)  BP(mean): --  RR: 16 (15 Boni 2022 08:32) (14 - 16)  SpO2: 99% (15 Boni 2022 08:32) (98% - 99%)

## 2022-06-15 NOTE — PROGRESS NOTE ADULT - SUBJECTIVE AND OBJECTIVE BOX
Patient is a 57y old  Male who presents with a chief complaint of Left tib/fib fracture (15 Boni 2022 11:18)      24 HOUR EVENTS:  No overnight events reported.     SUBJECTIVE:  Patient seen and examined at bedside.   He is feeling good today - happy to be out of bed early which makes him feel back in his own routine. He is an early riser at home.    ALLERGIES:  Allergy Status Unknown    MEDICATIONS  (STANDING):  apixaban 5 milliGRAM(s) Oral every 12 hours  lactulose Syrup 10 Gram(s) Oral daily  morphine ER Tablet 15 milliGRAM(s) Oral every 8 hours  pantoprazole    Tablet 40 milliGRAM(s) Oral before breakfast  polyethylene glycol 3350 17 Gram(s) Oral daily  senna 2 Tablet(s) Oral at bedtime  sertraline 25 milliGRAM(s) Oral <User Schedule>    MEDICATIONS  (PRN):  bisacodyl Suppository 10 milliGRAM(s) Rectal daily PRN If no bowel movement  oxyCODONE    IR 5 milliGRAM(s) Oral every 6 hours PRN Moderate Pain (4 - 6)  oxyCODONE    IR 10 milliGRAM(s) Oral every 6 hours PRN Severe Pain (7 - 10)    Vital Signs Last 24 Hrs  T(F): 98.5 (15 Boni 2022 08:32), Max: 98.9 (14 Jun 2022 21:54)  HR: 100 (15 Boni 2022 08:32) (80 - 100)  BP: 106/67 (15 Boni 2022 08:32) (106/67 - 115/78)  RR: 16 (15 Boni 2022 08:32) (14 - 16)  SpO2: 99% (15 Boni 2022 08:32) (98% - 99%)  I&O's Summary    14 Jun 2022 07:01  -  15 Boni 2022 07:00  --------------------------------------------------------  IN: 180 mL / OUT: 1050 mL / NET: -870 mL      PHYSICAL EXAM:  General: NAD, A/O x 3  ENT: Moist mucous membranes, no thrush  Neck: Supple, No JVD  Lungs: Clear to auscultation bilaterally, good air entry, non-labored breathing  Cardio: RRR, S1/S2, No murmur  Abdomen: Soft, Nontender, Nondistended; Bowel sounds present  Extremities: No calf tenderness, No pitting edema    LABS:                        8.9    8.21  )-----------( 793      ( 13 Jun 2022 06:00 )             26.9     06-13    137  |  101  |  33  ----------------------------<  116  4.1   |  29  |  1.14    Ca    9.4      13 Jun 2022 06:00    TPro  6.9  /  Alb  2.7  /  TBili  0.6  /  DBili  x   /  AST  41  /  ALT  118  /  AlkPhos  405  06-13    COVID-19 PCR: NotDetec (06-08-22 @ 21:00)  COVID-19 PCR: NotDetec (06-07-22 @ 10:35)  COVID-19 PCR: NotDetec (06-03-22 @ 07:32)  COVID-19 PCR: NotDetec (05-29-22 @ 10:41)    RADIOLOGY & ADDITIONAL TESTS:    < from: US Abdomen Limited (06.11.22 @ 09:11) >    IMPRESSION:    Normal right upper quadrant abdominal ultrasound.    < end of copied text >  < from: VA Duplex Lower Ext Vein Scan, Right (06.05.22 @ 10:33) >  IMPRESSION:  Acute deep venous thrombosis: below the knee at the level of the RIGHT   posterior tibial vein.    < end of copied text >      Care Discussed with Consultants/Other Providers:

## 2022-06-15 NOTE — DISCHARGE NOTE PROVIDER - NSDCFUADDINST_GEN_ALL_CORE_FT
Please follow up with your Doctor after your discharge from Rehab (2 weeks, call for appointment).  Do not weight bear on your left leg and keep it in knee immobilizer.  AFO brace for foot drop.

## 2022-06-15 NOTE — DISCHARGE NOTE PROVIDER - CARE PROVIDER_API CALL
Thompson Low)  Orthopaedic Surgery  [address], [address]  [place] [address]  Phone: [phone]  Fax: [phone]  Follow Up Time:     Tulio Ivory; MPH)  Surgery  Phys Medicine  [facility]  [address] [place] [address]  Phone: [phone]  Fax: [phone]  Follow Up Time: 1 month
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
## 2022-06-15 NOTE — BH CONSULTATION LIAISON PROGRESS NOTE - GENERAL APPEARANCE
At time of interview, patient is sitting in wheelchair in street clothes. Pt appears stated age & is well nourished./Other

## 2022-06-15 NOTE — DISCHARGE NOTE PROVIDER - NSDCFUADDAPPT_GEN_ALL_CORE_FT
Please schedule a post hospitalization appointment with your PCP within the next 1-2 weeks.    Call Dr. Low's office for a follow up appointment within 1-2 weeks.    For psychiatry appointments, call [facility] outpatient clinic [phone].

## 2022-06-16 ENCOUNTER — TRANSCRIPTION ENCOUNTER (OUTPATIENT)
Age: 57
End: 2022-06-16

## 2022-06-16 LAB
ALBUMIN SERPL ELPH-MCNC: 2.9 G/DL — LOW (ref 3.3–5)
ALP SERPL-CCNC: 316 U/L — HIGH (ref 40–120)
ALT FLD-CCNC: 74 U/L — HIGH (ref 10–45)
ANION GAP SERPL CALC-SCNC: 5 MMOL/L — SIGNIFICANT CHANGE UP (ref 5–17)
AST SERPL-CCNC: 30 U/L — SIGNIFICANT CHANGE UP (ref 10–40)
BILIRUB SERPL-MCNC: 0.4 MG/DL — SIGNIFICANT CHANGE UP (ref 0.2–1.2)
BUN SERPL-MCNC: 24 MG/DL — HIGH (ref 7–23)
CALCIUM SERPL-MCNC: 9.7 MG/DL — SIGNIFICANT CHANGE UP (ref 8.4–10.5)
CHLORIDE SERPL-SCNC: 101 MMOL/L — SIGNIFICANT CHANGE UP (ref 96–108)
CO2 SERPL-SCNC: 33 MMOL/L — HIGH (ref 22–31)
CREAT SERPL-MCNC: 1.15 MG/DL — SIGNIFICANT CHANGE UP (ref 0.5–1.3)
EGFR: 74 ML/MIN/1.73M2 — SIGNIFICANT CHANGE UP
GLUCOSE SERPL-MCNC: 107 MG/DL — HIGH (ref 70–99)
HCT VFR BLD CALC: 28.5 % — LOW (ref 39–50)
HGB BLD-MCNC: 9.3 G/DL — LOW (ref 13–17)
MCHC RBC-ENTMCNC: 30.7 PG — SIGNIFICANT CHANGE UP (ref 27–34)
MCHC RBC-ENTMCNC: 32.6 GM/DL — SIGNIFICANT CHANGE UP (ref 32–36)
MCV RBC AUTO: 94.1 FL — SIGNIFICANT CHANGE UP (ref 80–100)
NRBC # BLD: 0 /100 WBCS — SIGNIFICANT CHANGE UP (ref 0–0)
PLATELET # BLD AUTO: 751 K/UL — HIGH (ref 150–400)
POTASSIUM SERPL-MCNC: 4.4 MMOL/L — SIGNIFICANT CHANGE UP (ref 3.5–5.3)
POTASSIUM SERPL-SCNC: 4.4 MMOL/L — SIGNIFICANT CHANGE UP (ref 3.5–5.3)
PROT SERPL-MCNC: 7.1 G/DL — SIGNIFICANT CHANGE UP (ref 6–8.3)
RBC # BLD: 3.03 M/UL — LOW (ref 4.2–5.8)
RBC # FLD: 13 % — SIGNIFICANT CHANGE UP (ref 10.3–14.5)
SODIUM SERPL-SCNC: 139 MMOL/L — SIGNIFICANT CHANGE UP (ref 135–145)
WBC # BLD: 7.99 K/UL — SIGNIFICANT CHANGE UP (ref 3.8–10.5)
WBC # FLD AUTO: 7.99 K/UL — SIGNIFICANT CHANGE UP (ref 3.8–10.5)

## 2022-06-16 PROCEDURE — 99232 SBSQ HOSP IP/OBS MODERATE 35: CPT

## 2022-06-16 PROCEDURE — 99223 1ST HOSP IP/OBS HIGH 75: CPT

## 2022-06-16 RX ORDER — ALPRAZOLAM 0.25 MG
0.25 TABLET ORAL ONCE
Refills: 0 | Status: DISCONTINUED | OUTPATIENT
Start: 2022-06-18 | End: 2022-06-18

## 2022-06-16 RX ORDER — MORPHINE SULFATE 50 MG/1
15 CAPSULE, EXTENDED RELEASE ORAL EVERY 8 HOURS
Refills: 0 | Status: DISCONTINUED | OUTPATIENT
Start: 2022-06-16 | End: 2022-06-16

## 2022-06-16 RX ORDER — OXYCODONE HYDROCHLORIDE 5 MG/1
10 TABLET ORAL ONCE
Refills: 0 | Status: DISCONTINUED | OUTPATIENT
Start: 2022-06-16 | End: 2022-06-16

## 2022-06-16 RX ADMIN — OXYCODONE HYDROCHLORIDE 10 MILLIGRAM(S): 5 TABLET ORAL at 18:46

## 2022-06-16 RX ADMIN — OXYCODONE HYDROCHLORIDE 10 MILLIGRAM(S): 5 TABLET ORAL at 09:03

## 2022-06-16 RX ADMIN — APIXABAN 5 MILLIGRAM(S): 2.5 TABLET, FILM COATED ORAL at 06:34

## 2022-06-16 RX ADMIN — APIXABAN 5 MILLIGRAM(S): 2.5 TABLET, FILM COATED ORAL at 18:46

## 2022-06-16 RX ADMIN — OXYCODONE HYDROCHLORIDE 10 MILLIGRAM(S): 5 TABLET ORAL at 15:00

## 2022-06-16 RX ADMIN — PANTOPRAZOLE SODIUM 40 MILLIGRAM(S): 20 TABLET, DELAYED RELEASE ORAL at 06:34

## 2022-06-16 RX ADMIN — OXYCODONE HYDROCHLORIDE 10 MILLIGRAM(S): 5 TABLET ORAL at 14:04

## 2022-06-16 RX ADMIN — SERTRALINE 25 MILLIGRAM(S): 25 TABLET, FILM COATED ORAL at 18:45

## 2022-06-16 RX ADMIN — OXYCODONE HYDROCHLORIDE 10 MILLIGRAM(S): 5 TABLET ORAL at 08:04

## 2022-06-16 RX ADMIN — MORPHINE SULFATE 15 MILLIGRAM(S): 50 CAPSULE, EXTENDED RELEASE ORAL at 06:35

## 2022-06-16 RX ADMIN — OXYCODONE HYDROCHLORIDE 10 MILLIGRAM(S): 5 TABLET ORAL at 19:45

## 2022-06-16 RX ADMIN — OXYCODONE HYDROCHLORIDE 10 MILLIGRAM(S): 5 TABLET ORAL at 00:10

## 2022-06-16 RX ADMIN — MORPHINE SULFATE 15 MILLIGRAM(S): 50 CAPSULE, EXTENDED RELEASE ORAL at 07:14

## 2022-06-16 RX ADMIN — POLYETHYLENE GLYCOL 3350 17 GRAM(S): 17 POWDER, FOR SOLUTION ORAL at 11:44

## 2022-06-16 RX ADMIN — OXYCODONE HYDROCHLORIDE 10 MILLIGRAM(S): 5 TABLET ORAL at 01:00

## 2022-06-16 NOTE — DISCHARGE NOTE NURSING/CASE MANAGEMENT/SOCIAL WORK - PATIENT PORTAL LINK FT
You can access the FollowMyHealth Patient Portal offered by [facility] by registering at the following website: [email or web address]. By joining [facility]’s FollowMyHealth portal, you will also be able to view your health information using other applications (apps) compatible with our system.
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
## 2022-06-16 NOTE — PROGRESS NOTE ADULT - SUBJECTIVE AND OBJECTIVE BOX
Patient is a 57y old  Male who presents with a chief complaint of bilateral tibia injuries  Left open tibia fracture  right shin wound (15 Boin 2022 17:42)      24 HOUR EVENTS:  No overnight events reported.     SUBJECTIVE:  Patient seen and examined at bedside.   Pt does not offer complaints.  reports never having elevated platelets before.    ALLERGIES:  Allergy Status Unknown    MEDICATIONS  (STANDING):  apixaban 5 milliGRAM(s) Oral every 12 hours  lactulose Syrup 10 Gram(s) Oral daily  pantoprazole    Tablet 40 milliGRAM(s) Oral before breakfast  polyethylene glycol 3350 17 Gram(s) Oral daily  senna 2 Tablet(s) Oral at bedtime  sertraline 25 milliGRAM(s) Oral <User Schedule>    MEDICATIONS  (PRN):  bisacodyl Suppository 10 milliGRAM(s) Rectal daily PRN If no bowel movement  oxyCODONE    IR 5 milliGRAM(s) Oral every 6 hours PRN Moderate Pain (4 - 6)  oxyCODONE    IR 10 milliGRAM(s) Oral every 6 hours PRN Severe Pain (7 - 10)    Vital Signs Last 24 Hrs  T(F): 98.3 (16 Jun 2022 07:30), Max: 98.3 (16 Jun 2022 07:30)  HR: 92 (16 Jun 2022 07:30) (80 - 92)  BP: 102/73 (16 Jun 2022 07:30) (102/73 - 124/78)  RR: 16 (16 Jun 2022 07:30) (14 - 16)  SpO2: 98% (16 Jun 2022 07:30) (96% - 98%)  I&O's Summary    15 Boni 2022 07:01  -  16 Jun 2022 07:00  --------------------------------------------------------  IN: 180 mL / OUT: 1200 mL / NET: -1020 mL      PHYSICAL EXAM:  General: NAD, A/O x 3  ENT: Moist mucous membranes, no thrush  Neck: Supple, No JVD  Lungs: Clear to auscultation bilaterally, good air entry, non-labored breathing  Cardio: RRR, S1/S2, No murmur  Abdomen: Soft, Nontender, Nondistended; Bowel sounds present  Extremities: No calf tenderness, No pitting edema, left leg in ace wrappings.    LABS:                        9.3    7.99  )-----------( 751      ( 16 Jun 2022 06:43 )             28.5     06-16    139  |  101  |  24  ----------------------------<  107  4.4   |  33  |  1.15    Ca    9.7      16 Jun 2022 06:43    TPro  7.1  /  Alb  2.9  /  TBili  0.4  /  DBili  x   /  AST  30  /  ALT  74  /  AlkPhos  316  06-16                                          COVID-19 PCR: NotDetec (06-15-22 @ 05:30)  COVID-19 PCR: NotDetec (06-08-22 @ 21:00)  COVID-19 PCR: NotDetec (06-07-22 @ 10:35)  COVID-19 PCR: NotDetec (06-03-22 @ 07:32)  COVID-19 PCR: NotDetec (05-29-22 @ 10:41)    RADIOLOGY & ADDITIONAL TESTS:    Care Discussed with Consultants/Other Providers:
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
## 2022-06-16 NOTE — PROGRESS NOTE ADULT - ATTENDING COMMENTS
Seen with Dr Bourne, rehab resident    Left TIb/Fibula Fracture    Functional improvement sustained   Observed in therapy--hoping with Rt leg    LFT and Thrombocytosis being monitored    No bleeding or abd pain   US abd unremarkable    Left knee immobilizer in situ    Continue PT/OT  Continue all supportive treatments

## 2022-06-16 NOTE — DISCHARGE NOTE NURSING/CASE MANAGEMENT/SOCIAL WORK - NSDCPEFALRISK_GEN_ALL_CORE
For information on Fall & Injury Prevention, visit: [email or web address] OR  [email or web address] OR  https://www.cdc.gov/steadi/patient.html

## 2022-06-16 NOTE — PROGRESS NOTE ADULT - SUBJECTIVE AND OBJECTIVE BOX
SUBJECTIVE:  Pt seen and examined at bedside. No acute events overnight.  Patient in good spirits as he had visit with family yesterday evening.  Reports left knee pain with activity and occasionally with repositioning in bed.    Labs - LFTs downtrending, thrombocytosis 750s (improved from 6/13)    ROS: No reports of headache, dizziness, chest pain, dyspnea, abdominal pain, dysuria, diarrhea  Last BM 6/15    Vital Signs Last 24 Hrs  T(C): 36.8 (16 Jun 2022 07:30), Max: 36.8 (15 Boni 2022 21:43)  T(F): 98.3 (16 Jun 2022 07:30), Max: 98.3 (16 Jun 2022 07:30)  HR: 92 (16 Jun 2022 07:30) (80 - 92)  BP: 102/73 (16 Jun 2022 07:30) (102/73 - 124/78  RR: 16 (16 Jun 2022 07:30) (14 - 16)  SpO2: 98% (16 Jun 2022 07:30) (96% - 98%)    PHYSICAL EXAM:  Constitutional - NAD, Comfortable  HEENT - NAD  Neck - Supple, No limited ROM  Chest - Clear  Cardio - warm and well perfused, RRR, no murmur  Abdomen -  Soft, non tender  Ext-RLE--, LLE ace wrap and knee immobilizer in situ  Left carbon fiber AFO and surgical shoe on left foot    AAO x 3  MMT 5/5 except LLE limited by wt bearing status and pain  Able to wiggle toes  Sensation intact    RECENT LABS/IMAGING  06-16    139  |  101  |  24<H>  ----------------------------<  107<H>  4.4   |  33<H>  |  1.15    Ca    9.7      16 Jun 2022 06:43    TPro  7.1  /  Alb  2.9<L>  /  TBili  0.4  /  DBili  x   /  AST  30  /  ALT  74<H>  /  AlkPhos  316<H>  06-16                          9.3    7.99  )-----------( 751      ( 16 Jun 2022 06:43 )             28.5     CAPILLARY BLOOD GLUCOSE      6/11/22--US ABD for RUQ PAIN-  Normal right upper quadrant abdominal ultrasound.        MEDICATIONS  (STANDING):  apixaban 5 milliGRAM(s) Oral every 12 hours  lactulose Syrup 10 Gram(s) Oral daily  pantoprazole    Tablet 40 milliGRAM(s) Oral before breakfast  polyethylene glycol 3350 17 Gram(s) Oral daily  senna 2 Tablet(s) Oral at bedtime  sertraline 25 milliGRAM(s) Oral <User Schedule>    MEDICATIONS  (PRN):  bisacodyl Suppository 10 milliGRAM(s) Rectal daily PRN If no bowel movement  oxyCODONE    IR 5 milliGRAM(s) Oral every 6 hours PRN Moderate Pain (4 - 6)  oxyCODONE    IR 10 milliGRAM(s) Oral every 6 hours PRN Severe Pain (7 - 10)   SUBJECTIVE:  Pt seen and examined at bedside. No acute events overnight.  Patient in good spirits as he had visit with family yesterday evening.  Reports left knee pain with activity and occasionally with repositioning in bed.    Labs - LFTs downtrending, thrombocytosis 750s (improved from 6/13)    ROS: No reports of headache, dizziness, chest pain, dyspnea, abdominal pain, dysuria, diarrhea  Last BM 6/15    Vital Signs Last 24 Hrs  T(C): 36.8 (16 Jun 2022 07:30), Max: 36.8 (15 Boni 2022 21:43)  T(F): 98.3 (16 Jun 2022 07:30), Max: 98.3 (16 Jun 2022 07:30)  HR: 92 (16 Jun 2022 07:30) (80 - 92)  BP: 102/73 (16 Jun 2022 07:30) (102/73 - 124/78  RR: 16 (16 Jun 2022 07:30) (14 - 16)  SpO2: 98% (16 Jun 2022 07:30) (96% - 98%)    PHYSICAL EXAM:  Constitutional - NAD, Comfortable  HEENT - NAD  Neck - Supple, No limited ROM  Chest - Clear  Cardio - warm and well perfused, RRR, no murmur  Abdomen -  Soft, non tender  Ext-RLE--, LLE ace wrap and knee immobilizer in situ  Left carbon fiber AFO and surgical shoe on left foot    AAO x 3  MMT 5/5 except LLE limited by wt bearing status and pain  Able to wiggle toes  Sensation intact    RECENT LABS/IMAGING  06-16    139  |  101  |  24<H>  ----------------------------<  107<H>  4.4   |  33<H>  |  1.15    Ca    9.7      16 Jun 2022 06:43    TPro  7.1  /  Alb  2.9<L>  /  TBili  0.4  /  DBili  x   /  AST  30  /  ALT  74<H>  /  AlkPhos  316<H>  06-16                 9.3    7.99  )-----------( 751      ( 16 Jun 2022 06:43 )             28.5     CAPILLARY BLOOD GLUCOSE  6/11/22--US ABD for RUQ PAIN-  Normal right upper quadrant abdominal ultrasound.    MEDICATIONS  (STANDING):  apixaban 5 milliGRAM(s) Oral every 12 hours  lactulose Syrup 10 Gram(s) Oral daily  pantoprazole    Tablet 40 milliGRAM(s) Oral before breakfast  polyethylene glycol 3350 17 Gram(s) Oral daily  senna 2 Tablet(s) Oral at bedtime  sertraline 25 milliGRAM(s) Oral <User Schedule>    MEDICATIONS  (PRN):  bisacodyl Suppository 10 milliGRAM(s) Rectal daily PRN If no bowel movement  oxyCODONE    IR 5 milliGRAM(s) Oral every 6 hours PRN Moderate Pain (4 - 6)  oxyCODONE    IR 10 milliGRAM(s) Oral every 6 hours PRN Severe Pain (7 - 10)

## 2022-06-16 NOTE — DISCHARGE NOTE NURSING/CASE MANAGEMENT/SOCIAL WORK - NSDCFUADDAPPT_GEN_ALL_CORE_FT
Please schedule a post hospitalization appointment with your PCP within the next 1-2 weeks.      Call Dr. Low's office for a follow up appointment within 1-2 weeks.      For psychiatry appointments, please call [facility] outpatient clinic [phone].

## 2022-06-16 NOTE — DISCHARGE NOTE NURSING/CASE MANAGEMENT/SOCIAL WORK - NSDCVIVACCINE_GEN_ALL_CORE_FT
Influenza, seasonal, injectable; 28-Feb-2014 15:09; Depaula, Corinne J (RN); [id number]; IntraMuscular; Deltoid Left.; 0.5 milliLiter(s);   Tdap; 29-May-2022 11:39; Regina Ramirez (RN); [employer]; [id number] (Exp. Date: 09-Sep-2023); IntraMuscular; Deltoid Right.; 0.5 milliLiter(s); VIS (VIS Published: 09-May-2013, VIS Presented: 29-May-2022);
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
## 2022-06-17 LAB
BASOPHILS # BLD AUTO: 0.07 K/UL — SIGNIFICANT CHANGE UP (ref 0–0.2)
BASOPHILS NFR BLD AUTO: 0.9 % — SIGNIFICANT CHANGE UP (ref 0–2)
EOSINOPHIL # BLD AUTO: 0.29 K/UL — SIGNIFICANT CHANGE UP (ref 0–0.5)
EOSINOPHIL NFR BLD AUTO: 3.8 % — SIGNIFICANT CHANGE UP (ref 0–6)
HCT VFR BLD CALC: 26.9 % — LOW (ref 39–50)
HGB BLD-MCNC: 8.9 G/DL — LOW (ref 13–17)
IMM GRANULOCYTES NFR BLD AUTO: 1.2 % — SIGNIFICANT CHANGE UP (ref 0–1.5)
LYMPHOCYTES # BLD AUTO: 2.1 K/UL — SIGNIFICANT CHANGE UP (ref 1–3.3)
LYMPHOCYTES # BLD AUTO: 27.9 % — SIGNIFICANT CHANGE UP (ref 13–44)
MCHC RBC-ENTMCNC: 31 PG — SIGNIFICANT CHANGE UP (ref 27–34)
MCHC RBC-ENTMCNC: 33.1 GM/DL — SIGNIFICANT CHANGE UP (ref 32–36)
MCV RBC AUTO: 93.7 FL — SIGNIFICANT CHANGE UP (ref 80–100)
MONOCYTES # BLD AUTO: 0.75 K/UL — SIGNIFICANT CHANGE UP (ref 0–0.9)
MONOCYTES NFR BLD AUTO: 9.9 % — SIGNIFICANT CHANGE UP (ref 2–14)
NEUTROPHILS # BLD AUTO: 4.24 K/UL — SIGNIFICANT CHANGE UP (ref 1.8–7.4)
NEUTROPHILS NFR BLD AUTO: 56.3 % — SIGNIFICANT CHANGE UP (ref 43–77)
NRBC # BLD: 0 /100 WBCS — SIGNIFICANT CHANGE UP (ref 0–0)
PLATELET # BLD AUTO: 733 K/UL — HIGH (ref 150–400)
RBC # BLD: 2.87 M/UL — LOW (ref 4.2–5.8)
RBC # FLD: 12.7 % — SIGNIFICANT CHANGE UP (ref 10.3–14.5)
WBC # BLD: 7.54 K/UL — SIGNIFICANT CHANGE UP (ref 3.8–10.5)
WBC # FLD AUTO: 7.54 K/UL — SIGNIFICANT CHANGE UP (ref 3.8–10.5)

## 2022-06-17 PROCEDURE — 99232 SBSQ HOSP IP/OBS MODERATE 35: CPT

## 2022-06-17 RX ORDER — OXYCODONE HYDROCHLORIDE 5 MG/1
1 TABLET ORAL
Qty: 42 | Refills: 0
Start: 2022-06-17 | End: 2022-06-23

## 2022-06-17 RX ADMIN — OXYCODONE HYDROCHLORIDE 10 MILLIGRAM(S): 5 TABLET ORAL at 06:51

## 2022-06-17 RX ADMIN — OXYCODONE HYDROCHLORIDE 10 MILLIGRAM(S): 5 TABLET ORAL at 07:34

## 2022-06-17 RX ADMIN — APIXABAN 5 MILLIGRAM(S): 2.5 TABLET, FILM COATED ORAL at 06:29

## 2022-06-17 RX ADMIN — OXYCODONE HYDROCHLORIDE 10 MILLIGRAM(S): 5 TABLET ORAL at 00:42

## 2022-06-17 RX ADMIN — APIXABAN 5 MILLIGRAM(S): 2.5 TABLET, FILM COATED ORAL at 17:11

## 2022-06-17 RX ADMIN — OXYCODONE HYDROCHLORIDE 10 MILLIGRAM(S): 5 TABLET ORAL at 18:55

## 2022-06-17 RX ADMIN — OXYCODONE HYDROCHLORIDE 10 MILLIGRAM(S): 5 TABLET ORAL at 18:00

## 2022-06-17 RX ADMIN — OXYCODONE HYDROCHLORIDE 10 MILLIGRAM(S): 5 TABLET ORAL at 01:30

## 2022-06-17 RX ADMIN — SERTRALINE 25 MILLIGRAM(S): 25 TABLET, FILM COATED ORAL at 17:11

## 2022-06-17 RX ADMIN — POLYETHYLENE GLYCOL 3350 17 GRAM(S): 17 POWDER, FOR SOLUTION ORAL at 11:09

## 2022-06-17 RX ADMIN — PANTOPRAZOLE SODIUM 40 MILLIGRAM(S): 20 TABLET, DELAYED RELEASE ORAL at 06:29

## 2022-06-17 NOTE — PROGRESS NOTE ADULT - SUBJECTIVE AND OBJECTIVE BOX
Patient is a 57y old  Male who presents with a chief complaint of Left tib/fib fracture (16 Jun 2022 19:26)      SUBJECTIVE / OVERNIGHT EVENTS:  Pt seen and examined at bedside. No acute events overnight.  Pt denies cp, palpitations, sob, abd pain, N/V, fever, chills.    ROS:  All other review of systems negative    Allergies    Allergy Status Unknown    Intolerances        MEDICATIONS  (STANDING):  apixaban 5 milliGRAM(s) Oral every 12 hours  lactulose Syrup 10 Gram(s) Oral daily  pantoprazole    Tablet 40 milliGRAM(s) Oral before breakfast  polyethylene glycol 3350 17 Gram(s) Oral daily  senna 2 Tablet(s) Oral at bedtime  sertraline 25 milliGRAM(s) Oral <User Schedule>    MEDICATIONS  (PRN):  bisacodyl Suppository 10 milliGRAM(s) Rectal daily PRN If no bowel movement  oxyCODONE    IR 5 milliGRAM(s) Oral every 6 hours PRN Moderate Pain (4 - 6)  oxyCODONE    IR 10 milliGRAM(s) Oral every 6 hours PRN Severe Pain (7 - 10)      Vital Signs Last 24 Hrs  T(C): 36.7 (17 Jun 2022 09:23), Max: 36.9 (16 Jun 2022 21:50)  T(F): 98 (17 Jun 2022 09:23), Max: 98.4 (16 Jun 2022 21:50)  HR: 87 (17 Jun 2022 09:23) (87 - 88)  BP: 109/61 (17 Jun 2022 09:23) (107/73 - 109/61)  BP(mean): --  RR: 15 (17 Jun 2022 09:23) (14 - 15)  SpO2: 99% (17 Jun 2022 09:23) (99% - 99%)  CAPILLARY BLOOD GLUCOSE        I&O's Summary    16 Jun 2022 07:01  -  17 Jun 2022 07:00  --------------------------------------------------------  IN: 180 mL / OUT: 0 mL / NET: 180 mL        PHYSICAL EXAM:  GENERAL: NAD, well-developed male  HEAD:  Atraumatic, Normocephalic  NECK: Supple, No JVD  CHEST/LUNG: Clear to auscultation bilaterally; No wheeze, nonlabored breathing  HEART: Regular rate and rhythm; No murmurs, rubs, or gallops  ABDOMEN: Soft, Nontender, Nondistended; Bowel sounds present  EXTREMITIES:  LLE in immobilizer, no cyanosis, no edema  NEUROLOGY: AAOx3, non-focal  PSYCH: calm, appropriate mood    LABS:                        8.9    7.54  )-----------( 733      ( 17 Jun 2022 05:45 )             26.9     06-16    139  |  101  |  24<H>  ----------------------------<  107<H>  4.4   |  33<H>  |  1.15    Ca    9.7      16 Jun 2022 06:43    TPro  7.1  /  Alb  2.9<L>  /  TBili  0.4  /  DBili  x   /  AST  30  /  ALT  74<H>  /  AlkPhos  316<H>  06-16              RADIOLOGY & ADDITIONAL TESTS:  Results Reviewed:   Imaging Personally Reviewed:  Electrocardiogram Personally Reviewed:    COORDINATION OF CARE:  Care Discussed with Consultants/Other Providers [Y/N]:  Prior or Outpatient Records Reviewed [Y/N]:
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
## 2022-06-17 NOTE — PROGRESS NOTE ADULT - ATTENDING COMMENTS
Seen with Dr Bourne, rehab resident    Left TIb/Fibula Fracture    Functional improvement sustained   Relapse of pain noted this am will monitor    LFT and Thrombocytosis downtrending  Heme attributes high plat to reactive etiology    No bleeding or abd pain   US abd unremarkable    Left knee immobilizer in situ    Continue PT/OT  Continue all supportive treatments  Will recommence MS contin if pain symptoms persist  Liaison with pharmacy for auth for oral anticoagulation, if no success, will defer dc and commence coumadin  dc tomorrow as planned

## 2022-06-17 NOTE — PROGRESS NOTE ADULT - PROVIDER SPECIALTY LIST ADULT
Rehab Medicine
Hospitalist
Rehab Medicine
Hospitalist
Rehab Medicine
Hospitalist
Hospitalist
Rehab Medicine

## 2022-06-17 NOTE — PROGRESS NOTE ADULT - SUBJECTIVE AND OBJECTIVE BOX
SUBJECTIVE:  Pt seen and examined at bedside. No acute events overnight.  Reports relapse of pain post AM therapy  Long acting oxycodone was d/clotilde yesterday  Will recommence if pain continues during the day    Labs - LFTs downtrending, thrombocytosis 750s (improved from 6/13),  Heme onc attributed it to reactive and no intervention this time, to f/u with PCP    ROS: No reports of headache, dizziness, chest pain, dyspnea, abdominal pain, dysuria, diarrhea  Last BM 6/15    Vital Signs Last 24 Hrs  T(C): 36.7 (17 Jun 2022 09:23), Max: 36.9 (16 Jun 2022 21:50)  T(F): 98 (17 Jun 2022 09:23), Max: 98.4 (16 Jun 2022 21:50)  HR: 87 (17 Jun 2022 09:23) (87 - 88)  BP: 109/61 (17 Jun 2022 09:23) (107/73 - 109/61)  RR: 15 (17 Jun 2022 09:23) (14 - 15)  SpO2: 99% (17 Jun 2022 09:23) (99% - 99%)      PHYSICAL EXAM:  Constitutional - NAD, Comfortable  HEENT - NAD  Neck - Supple, No limited ROM  Chest - Clear  Cardio - warm and well perfused, RRR, no murmur  Abdomen -  Soft, non tender  Ext-RLE--, LLE ace wrap and knee immobilizer in situ  Left carbon fiber AFO and surgical shoe on left foot    AAO x 3  MMT 5/5 except LLE limited by wt bearing status and pain  Able to wiggle toes  Sensation intact    RECENT LABS/IMAGING  06-16    139  |  101  |  24<H>  ----------------------------<  107<H>  4.4   |  33<H>  |  1.15    Ca    9.7      16 Jun 2022 06:43    TPro  7.1  /  Alb  2.9<L>  /  TBili  0.4  /  DBili  x   /  AST  30  /  ALT  74<H>  /  AlkPhos  316<H>  06-16                 9.3    7.99  )-----------( 751      ( 16 Jun 2022 06:43 )             28.5     CAPILLARY BLOOD GLUCOSE  6/11/22--US ABD for RUQ PAIN-  Normal right upper quadrant abdominal ultrasound.    MEDICATIONS  (STANDING):  apixaban 5 milliGRAM(s) Oral every 12 hours  lactulose Syrup 10 Gram(s) Oral daily  pantoprazole    Tablet 40 milliGRAM(s) Oral before breakfast  polyethylene glycol 3350 17 Gram(s) Oral daily  senna 2 Tablet(s) Oral at bedtime  sertraline 25 milliGRAM(s) Oral <User Schedule>    MEDICATIONS  (PRN):  bisacodyl Suppository 10 milliGRAM(s) Rectal daily PRN If no bowel movement  oxyCODONE    IR 5 milliGRAM(s) Oral every 6 hours PRN Moderate Pain (4 - 6)  oxyCODONE    IR 10 milliGRAM(s) Oral every 6 hours PRN Severe Pain (7 - 10)
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
## 2022-06-17 NOTE — PROGRESS NOTE ADULT - REASON FOR ADMISSION
Left tib/fib fracture

## 2022-06-18 VITALS
OXYGEN SATURATION: 98 % | SYSTOLIC BLOOD PRESSURE: 129 MMHG | HEART RATE: 88 BPM | RESPIRATION RATE: 15 BRPM | DIASTOLIC BLOOD PRESSURE: 73 MMHG | TEMPERATURE: 98 F

## 2022-06-18 PROCEDURE — 97110 THERAPEUTIC EXERCISES: CPT

## 2022-06-18 PROCEDURE — 80076 HEPATIC FUNCTION PANEL: CPT

## 2022-06-18 PROCEDURE — 76705 ECHO EXAM OF ABDOMEN: CPT

## 2022-06-18 PROCEDURE — 85025 COMPLETE CBC W/AUTO DIFF WBC: CPT

## 2022-06-18 PROCEDURE — 99239 HOSP IP/OBS DSCHRG MGMT >30: CPT

## 2022-06-18 PROCEDURE — 97116 GAIT TRAINING THERAPY: CPT

## 2022-06-18 PROCEDURE — 97535 SELF CARE MNGMENT TRAINING: CPT

## 2022-06-18 PROCEDURE — 97163 PT EVAL HIGH COMPLEX 45 MIN: CPT

## 2022-06-18 PROCEDURE — 80053 COMPREHEN METABOLIC PANEL: CPT

## 2022-06-18 PROCEDURE — 36415 COLL VENOUS BLD VENIPUNCTURE: CPT

## 2022-06-18 PROCEDURE — U0005: CPT

## 2022-06-18 PROCEDURE — 97167 OT EVAL HIGH COMPLEX 60 MIN: CPT

## 2022-06-18 PROCEDURE — 85027 COMPLETE CBC AUTOMATED: CPT

## 2022-06-18 PROCEDURE — 86803 HEPATITIS C AB TEST: CPT

## 2022-06-18 PROCEDURE — 97542 WHEELCHAIR MNGMENT TRAINING: CPT

## 2022-06-18 PROCEDURE — 97530 THERAPEUTIC ACTIVITIES: CPT

## 2022-06-18 PROCEDURE — U0003: CPT

## 2022-06-18 RX ADMIN — OXYCODONE HYDROCHLORIDE 10 MILLIGRAM(S): 5 TABLET ORAL at 01:14

## 2022-06-18 RX ADMIN — OXYCODONE HYDROCHLORIDE 10 MILLIGRAM(S): 5 TABLET ORAL at 06:59

## 2022-06-18 RX ADMIN — OXYCODONE HYDROCHLORIDE 10 MILLIGRAM(S): 5 TABLET ORAL at 00:34

## 2022-06-18 RX ADMIN — APIXABAN 5 MILLIGRAM(S): 2.5 TABLET, FILM COATED ORAL at 05:54

## 2022-06-18 RX ADMIN — PANTOPRAZOLE SODIUM 40 MILLIGRAM(S): 20 TABLET, DELAYED RELEASE ORAL at 05:54

## 2022-06-18 RX ADMIN — OXYCODONE HYDROCHLORIDE 10 MILLIGRAM(S): 5 TABLET ORAL at 07:40

## 2022-06-20 ENCOUNTER — APPOINTMENT (OUTPATIENT)
Dept: ORTHOPEDIC SURGERY | Facility: CLINIC | Age: 57
End: 2022-06-20
Payer: COMMERCIAL

## 2022-06-20 VITALS — HEIGHT: 71 IN

## 2022-06-20 DIAGNOSIS — Z87.39 PERSONAL HISTORY OF OTHER DISEASES OF THE MUSCULOSKELETAL SYSTEM AND CONNECTIVE TISSUE: ICD-10-CM

## 2022-06-20 PROCEDURE — 99024 POSTOP FOLLOW-UP VISIT: CPT

## 2022-06-20 PROCEDURE — 73560 X-RAY EXAM OF KNEE 1 OR 2: CPT | Mod: LT

## 2022-06-20 RX ORDER — OXYCODONE 10 MG/1
10 TABLET ORAL
Qty: 42 | Refills: 0 | Status: ACTIVE | COMMUNITY
Start: 2022-06-17

## 2022-06-20 RX ORDER — LACTULOSE 10 G/15ML
10 SOLUTION ORAL
Qty: 473 | Refills: 0 | Status: ACTIVE | COMMUNITY
Start: 2022-06-15

## 2022-06-20 RX ORDER — APIXABAN 5 MG/1
5 TABLET, FILM COATED ORAL
Qty: 60 | Refills: 0 | Status: ACTIVE | COMMUNITY
Start: 2022-06-15

## 2022-06-20 RX ORDER — FOLIC ACID 1 MG/1
1 TABLET ORAL
Qty: 30 | Refills: 0 | Status: ACTIVE | COMMUNITY
Start: 2022-06-15

## 2022-06-20 RX ORDER — SERTRALINE 25 MG/1
25 TABLET, FILM COATED ORAL
Qty: 30 | Refills: 0 | Status: ACTIVE | COMMUNITY
Start: 2022-06-15

## 2022-06-20 RX ORDER — PANTOPRAZOLE 40 MG/1
40 TABLET, DELAYED RELEASE ORAL
Qty: 30 | Refills: 0 | Status: ACTIVE | COMMUNITY
Start: 2022-06-15

## 2022-06-20 RX ORDER — FINASTERIDE 5 MG/1
5 TABLET, FILM COATED ORAL
Qty: 30 | Refills: 0 | Status: ACTIVE | COMMUNITY
Start: 2022-06-15

## 2022-06-21 PROBLEM — Z87.39 HISTORY OF LEFT FOOT DROP: Status: ACTIVE | Noted: 2022-06-21

## 2022-06-21 NOTE — HISTORY OF PRESENT ILLNESS
[de-identified] : s/p Irrigation and debridement of left open tibial plateau fracture, Application of spanning external fixator for left tibial plateau across the left knee, Irrigation and debridement and repair of right knee laceration, and application of left long-leg splint.\par DOS: 05/29/2022\par \par s/p Removal of external fixator, I and D, left open proximal tibia fracture, Open reduction and internal fixation of left proximal shaft tibia fracture with medial and lateral plate and Application of trilaminar short leg splint to treat the foot drop.\par DOS: 06/04/2022 [de-identified] : The patient is a 57 year male who returns for the 1st postoperative visit after undergoing  Irrigation and debridement of left open tibial plateau fracture, Application of spanning external fixator for left tibial plateau across the left knee, Irrigation and debridement and repair of right knee laceration and application of left long-leg splint on 5/29/22. Removal of external fixator, I and D, left open proximal tibia fracture, Open reduction and internal fixation of left proximal shaft tibia fracture with medial and lateral plate and Application of trilaminar short leg splint to treat the foot drop on 06/04/2022. All the above procedures were performed a [facility]. He is now recovery at home. He has been using ice for pain control as well as taking Oxycodone. He reports to have not taken Oxycodone since 6am this morning. He is currently taking Eliquis 5 mg. [de-identified] : Patient is WDWN, alert, and in no acute distress. Breathing is unlabored. He is grossly oriented to person, place, and time.\par \par He is accompanied by his wife today.\par \par He presents NWB in a wheelchair. A knee immobilizer brace is adorned to the left knee and was removed for physical exam and wound check during the visit today. There is an AFO adorned to the left foot. \par \par Sutures were removed to the incision site. The incision sites are healing well, without signs of postoperative infection.\par There is edema noted diffusely through the knee as well as lower leg. Tenderness present along the incision site.\par ROM to the left knee: 0°-60° \par Drop foot present to the left foot. He has not able to actively dorsiflex the left ankle.  [de-identified] : AP, lateral and oblique views of the LEFT knee were obtained today and revealed a well aligned bicondylar tibial plateau fracture stabilized by A 14hole 4.5 proximal lateral tibial plateau plate with percutaneous guided 4.5 cortical screws in the shaft and proximal locking 5.0 screws and fully threaded 6.5 cancellous screws proximally.  In addition, a Tplate with a DCP for the medial plateau and shaft. [de-identified] : The sutures were removed. He was instructed on local wound care and as the incision site heals, he was then further instructed to use Vitamin E oil on the scars once the wounds fully heal.\par He will remain as NWB for a total of 12 weeks postoperatively.\par He may discontinue use of the knee immobilizer brace when indoors, as I told him I'd like him to begin gentle passive and active ROM the left knee. I advised him to sleep with a pillow beneath his foot/ankle to stretch the joint capsule of the knee into extension.\par He was advised to transition off of the Oxycodone as we discussed the side effects and addictive nature of narcotics. I recommended he take Tylenol extra strength and use Tylenol PM at night to aid with sleep. He may also use CBD oil and topical analgesics for pain relief. \par He was advised to Calcium Citrate, Vitamin D3 and Vitamin C.\par The patient wishes to proceed with at-home physical therapy of the left knee. (NWB, ROM). A script was given.\par Follow up in 6 weeks for repeat xrays.

## 2022-06-21 NOTE — ADDENDUM
[FreeTextEntry1] : I, Joyce Montano wrote this note acting as a scribe for Dr. Thompson Low on Jun 20, 2022.

## 2022-06-21 NOTE — END OF VISIT
[FreeTextEntry3] : All medical record entries made by the Scribe were at my,  Dr. Thompson Low MD., direction and personally dictated by me on 06/20/2022. I have personally reviewed the chart and agree that the record accurately reflects my personal performance of the history, physical exam, assessment and plan.

## 2022-06-30 ENCOUNTER — FORM ENCOUNTER (OUTPATIENT)
Age: 57
End: 2022-06-30

## 2022-07-10 ENCOUNTER — FORM ENCOUNTER (OUTPATIENT)
Age: 57
End: 2022-07-10

## 2022-07-18 DIAGNOSIS — E53.8 DEFICIENCY OF OTHER SPECIFIED B GROUP VITAMINS: ICD-10-CM

## 2022-07-18 RX ORDER — FOLIC ACID 1 MG/1
1 TABLET ORAL DAILY
Qty: 30 | Refills: 4 | Status: COMPLETED | COMMUNITY
Start: 2022-07-18

## 2022-07-22 ENCOUNTER — APPOINTMENT (OUTPATIENT)
Dept: PHYSICAL MEDICINE AND REHAB | Facility: CLINIC | Age: 57
End: 2022-07-22

## 2022-07-27 ENCOUNTER — FORM ENCOUNTER (OUTPATIENT)
Age: 57
End: 2022-07-27

## 2022-08-01 ENCOUNTER — APPOINTMENT (OUTPATIENT)
Dept: ORTHOPEDIC SURGERY | Facility: CLINIC | Age: 57
End: 2022-08-01

## 2022-08-01 VITALS — BODY MASS INDEX: 27.72 KG/M2 | WEIGHT: 198 LBS | HEIGHT: 71 IN

## 2022-08-01 PROCEDURE — 73564 X-RAY EXAM KNEE 4 OR MORE: CPT | Mod: LT

## 2022-08-01 PROCEDURE — 99024 POSTOP FOLLOW-UP VISIT: CPT

## 2022-08-01 NOTE — HISTORY OF PRESENT ILLNESS
[de-identified] : s/p Irrigation and debridement of left open tibial plateau fracture, Application of spanning external fixator for left tibial plateau across the left knee, Irrigation and debridement and repair of right knee laceration, and application of left long-leg splint.\par DOS: 05/29/2022\par \par s/p Removal of external fixator, I and D, left open proximal tibia fracture, Open reduction and internal fixation of left proximal shaft tibia fracture with medial and lateral plate and Application of trilaminar short leg splint to treat the foot drop.\par DOS: 06/04/2022 [de-identified] : The patient is a 57 year male who returns for the 2nd postoperative visit after undergoing  Irrigation and debridement of left open tibial plateau fracture, Application of spanning external fixator for left tibial plateau across the left knee, Irrigation and debridement and repair of right knee laceration and application of left long-leg splint on 5/29/22. Removal of external fixator, I and D, left open proximal tibia fracture, Open reduction and internal fixation of left proximal shaft tibia fracture with medial and lateral plate and Application of trilaminar short leg splint to treat the foot drop on 06/04/2022. All the above procedures were performed a [facility]. He has not yet began to weight bear. He was receiving at home PT, which has completed. He has completed the prescribed course of Zoloft. His wife is inquiring about whether or not he can discontinue its use. He recently had another Doppler Ultrasound which was also negative for DVT.  [de-identified] : Patient is WDWN, alert, and in no acute distress. Breathing is unlabored. He is grossly oriented to person, place, and time.\par \par He is accompanied by his wife today.\par \par He presents NWB in a wheelchair.  There is an AFO adorned to the left foot. \par \par The incision sites are well healed, without signs of postoperative infection.\par There is edema noted diffusely through the knee as well as lower leg. Tenderness present along the incision site.\par ROM to the left knee: 0°-100° \par Drop foot present to the left foot. He is not able to actively dorsiflex the left ankle. \par No numbness dorsally along the foot or to the toes.  [de-identified] : AP, lateral and oblique views of the LEFT knee were obtained today and revealed a well aligned bicondylar tibial plateau fracture stabilized by a 14hole 4.5 proximal lateral tibial plateau plate with percutaneous guided 4.5 cortical screws in the shaft and proximal locking 5.0 screws and fully threaded 6.5 cancellous screws proximally.  In addition, a Tplate with a DCP for the medial plateau and shaft. The fracture is healing.  [de-identified] : At this time, he may begin to 50% WB until this Friday and then transition to WBAT but with the assistance of crutches or a walker. As he has completed in home PT, I recommended he begin out patient physical therapy. He may also utilize a recumbent bike, without resistance and can begin aqua therapy. He was instructed on Achilles stretching.\par He was instructed to use Vitamin E oil on the scar. \par As he and his wife inquired, I told him that I am not familiar with withdrawal from mood stabilizers such as Zoloft but that I do not typically prescribe anti-depressants and would defer to a primary care physician. He stated that he does not currently have a PCP as his physician does not take no fault insurance. \par The patient wishes to proceed with physical therapy of the left knee (WBAT).  A script was given.\par RX: Crutches\par RX: Adjustable cane\par Follow up in 6 weeks for repeat xrays.

## 2022-08-01 NOTE — END OF VISIT
[FreeTextEntry3] : All medical record entries made by the Scribe were at my,  Dr. Thompson Low MD., direction and personally dictated by me on 08/01/2022. I have personally reviewed the chart and agree that the record accurately reflects my personal performance of the history, physical exam, assessment and plan.

## 2022-08-01 NOTE — ADDENDUM
[FreeTextEntry1] : I, Joyce Montano wrote this note acting as a scribe for Dr. Thompson Low on Aug 01, 2022.

## 2022-08-11 PROCEDURE — 97164 PT RE-EVAL EST PLAN CARE: CPT

## 2022-08-11 PROCEDURE — 83605 ASSAY OF LACTIC ACID: CPT

## 2022-08-11 PROCEDURE — U0003: CPT

## 2022-08-11 PROCEDURE — 93971 EXTREMITY STUDY: CPT

## 2022-08-11 PROCEDURE — 82435 ASSAY OF BLOOD CHLORIDE: CPT

## 2022-08-11 PROCEDURE — 73700 CT LOWER EXTREMITY W/O DYE: CPT | Mod: MG

## 2022-08-11 PROCEDURE — 96365 THER/PROPH/DIAG IV INF INIT: CPT

## 2022-08-11 PROCEDURE — 82565 ASSAY OF CREATININE: CPT

## 2022-08-11 PROCEDURE — 85014 HEMATOCRIT: CPT

## 2022-08-11 PROCEDURE — 96376 TX/PRO/DX INJ SAME DRUG ADON: CPT

## 2022-08-11 PROCEDURE — 99291 CRITICAL CARE FIRST HOUR: CPT | Mod: 25

## 2022-08-11 PROCEDURE — 71045 X-RAY EXAM CHEST 1 VIEW: CPT

## 2022-08-11 PROCEDURE — 97110 THERAPEUTIC EXERCISES: CPT

## 2022-08-11 PROCEDURE — 36415 COLL VENOUS BLD VENIPUNCTURE: CPT

## 2022-08-11 PROCEDURE — C1713: CPT

## 2022-08-11 PROCEDURE — 84295 ASSAY OF SERUM SODIUM: CPT

## 2022-08-11 PROCEDURE — 90715 TDAP VACCINE 7 YRS/> IM: CPT

## 2022-08-11 PROCEDURE — 80048 BASIC METABOLIC PNL TOTAL CA: CPT

## 2022-08-11 PROCEDURE — 82330 ASSAY OF CALCIUM: CPT

## 2022-08-11 PROCEDURE — 73552 X-RAY EXAM OF FEMUR 2/>: CPT

## 2022-08-11 PROCEDURE — U0005: CPT

## 2022-08-11 PROCEDURE — 84132 ASSAY OF SERUM POTASSIUM: CPT

## 2022-08-11 PROCEDURE — 76377 3D RENDER W/INTRP POSTPROCES: CPT

## 2022-08-11 PROCEDURE — 86901 BLOOD TYPING SEROLOGIC RH(D): CPT

## 2022-08-11 PROCEDURE — 97162 PT EVAL MOD COMPLEX 30 MIN: CPT

## 2022-08-11 PROCEDURE — 93005 ELECTROCARDIOGRAM TRACING: CPT

## 2022-08-11 PROCEDURE — 72170 X-RAY EXAM OF PELVIS: CPT

## 2022-08-11 PROCEDURE — 82803 BLOOD GASES ANY COMBINATION: CPT

## 2022-08-11 PROCEDURE — 85610 PROTHROMBIN TIME: CPT

## 2022-08-11 PROCEDURE — 86900 BLOOD TYPING SEROLOGIC ABO: CPT

## 2022-08-11 PROCEDURE — C1889: CPT

## 2022-08-11 PROCEDURE — 76000 FLUOROSCOPY <1 HR PHYS/QHP: CPT

## 2022-08-11 PROCEDURE — 82550 ASSAY OF CK (CPK): CPT

## 2022-08-11 PROCEDURE — 80053 COMPREHEN METABOLIC PANEL: CPT

## 2022-08-11 PROCEDURE — C9399: CPT

## 2022-08-11 PROCEDURE — 85730 THROMBOPLASTIN TIME PARTIAL: CPT

## 2022-08-11 PROCEDURE — 73590 X-RAY EXAM OF LOWER LEG: CPT

## 2022-08-11 PROCEDURE — 96375 TX/PRO/DX INJ NEW DRUG ADDON: CPT

## 2022-08-11 PROCEDURE — 83690 ASSAY OF LIPASE: CPT

## 2022-08-11 PROCEDURE — 85025 COMPLETE CBC W/AUTO DIFF WBC: CPT

## 2022-08-11 PROCEDURE — 90471 IMMUNIZATION ADMIN: CPT

## 2022-08-11 PROCEDURE — 85027 COMPLETE CBC AUTOMATED: CPT

## 2022-08-11 PROCEDURE — 97116 GAIT TRAINING THERAPY: CPT

## 2022-08-11 PROCEDURE — 82947 ASSAY GLUCOSE BLOOD QUANT: CPT

## 2022-08-11 PROCEDURE — G1004: CPT

## 2022-08-11 PROCEDURE — 97166 OT EVAL MOD COMPLEX 45 MIN: CPT

## 2022-08-11 PROCEDURE — 85018 HEMOGLOBIN: CPT

## 2022-08-11 PROCEDURE — C1769: CPT

## 2022-08-11 PROCEDURE — 86850 RBC ANTIBODY SCREEN: CPT

## 2022-08-11 PROCEDURE — 97530 THERAPEUTIC ACTIVITIES: CPT

## 2022-09-12 ENCOUNTER — APPOINTMENT (OUTPATIENT)
Dept: ORTHOPEDIC SURGERY | Facility: CLINIC | Age: 57
End: 2022-09-12

## 2022-09-12 PROCEDURE — 73564 X-RAY EXAM KNEE 4 OR MORE: CPT | Mod: LT

## 2022-09-12 PROCEDURE — 20550 NJX 1 TENDON SHEATH/LIGAMENT: CPT | Mod: F3

## 2022-09-12 PROCEDURE — 99072 ADDL SUPL MATRL&STAF TM PHE: CPT

## 2022-09-12 PROCEDURE — 20526 THER INJECTION CARP TUNNEL: CPT | Mod: LT

## 2022-09-12 PROCEDURE — 99213 OFFICE O/P EST LOW 20 MIN: CPT | Mod: 25

## 2022-09-12 NOTE — REASON FOR VISIT
[Follow-Up Visit] : a follow-up visit for [No Fault] : This visit is related to no fault  [FreeTextEntry2] : s/p Irrigation and debridement of left open tibial plateau fracture, Application of spanning external fixator for left tibial plateau across the left knee, Irrigation and debridement and repair of right knee laceration, and application of left long-leg splint. s/p Removal of external fixator, I and D, left open proximal tibia fracture, Open reduction and internal fixation of left proximal shaft tibia fracture with medial and lateral plate and Application of trilaminar short leg splint to treat the foot drop.

## 2022-09-12 NOTE — PHYSICAL EXAM
[de-identified] : Patient is WDWN, alert, and in no acute distress. Breathing is unlabored. He is grossly oriented to person, place, and time.\par \par He is accompanied by his wife today.\par \par He is WBAT with the assistance of crutches.\par He has a left AFO adorned.\par \par The incision sites are well healed, without signs of postoperative infection.\par There is edema noted diffusely through the knee as well as lower leg. Tenderness present along the incision site.\par ROM to the left knee: 0°-100° \par Drop foot present to the left foot. He is not able to actively dorsiflex the left ankle. \par No numbness dorsally along the foot or to the toes. \par \par Bilateral Hands:\par Phalen's Test: Negative bilaterally\par Tinel's Test: Positive to the right wrist\par Tinel's Test: Negative to the left wrist\par There is tenderness over the A1 pulley of the left ring finger as well as associated triggering.  [de-identified] : AP, lateral and oblique views of the LEFT knee were obtained today and revealed a well aligned bicondylar tibial plateau fracture stabilized by a 14_hole 4.5 proximal lateral tibial plateau plate with percutaneous guided 4.5 cortical screws in the shaft and proximal locking 5.0 screws and fully threaded 6.5 cancellous screws proximally. In addition, a T_plate with a DCP for the medial plateau and shaft. The fracture is still healing and the hardware has remained well aligned and well fixated.

## 2022-09-12 NOTE — HISTORY OF PRESENT ILLNESS
[de-identified] : The patient is a 57 year male who returns for a follow up visit after undergoing Irrigation and debridement of left open tibial plateau fracture, Application of spanning external fixator for left tibial plateau across the left knee, Irrigation and debridement and repair of right knee laceration and application of left long-leg splint on 5/29/22. Removal of external fixator, I and D, left open proximal tibia fracture, Open reduction and internal fixation of left proximal shaft tibia fracture with medial and lateral plate and Application of trilaminar short leg splint to treat the foot drop on 06/04/2022. All the above procedures were performed a [facility]. He returns for repeat xrays on 9/12/22 and reports to have a significant increase posteriorly to his left knee which only recently developed. He states he was laying in bed and his leg twisted leading to the increase in his pain. He has been using ice to control for his pain. He reports to have mild increases in motion at the left ankle. He also complains of numbness and tingling to both hands, which was present prior to the accident but has been exacerbated since. He wears braces at night and states without braces, it is quite painful. He is woken up at night by his symptoms.

## 2022-09-12 NOTE — ADDENDUM
[FreeTextEntry1] : I, Joyce Montano wrote this note acting as a scribe for Dr. Thompson Low on Sep 12, 2022.

## 2022-09-12 NOTE — DISCUSSION/SUMMARY
[de-identified] : The underlying pathophysiology was reviewed with the patient. XR films were reviewed with the patient. Discussed at length the nature of the patient’s condition. \par \par With regard to the left lower extremity, I discussed the xray findings in great deal with the patient and his wife. I reassured them that the hardware has remained in good position and has not failed which is a very good sign as the fracture is not yet healed. I told them that the residual symptoms he is experiencing are normal given the nature of the injury he sustained.\par He was instructed to sleep with the leg elevated to reduce edema and utilize a compression sleeve as needed.\par I told him he may sleep without the AFO at night but he should be performing Achilles tendon stretching to avoid shortening of the Achilles tendon and further tightness through the gastrocnemius.\par I recommended he continue with physical therapy and low impact activities such as swimming, elliptical and stationary bike. He was additionally advised to perform leg lift exercises for strengthening of the quadriceps. \par Finally, he will continue with physical therapy. \par RX: Adjustable cane.\par \par With regard to the bilateral hand numbness and tingling, I told him that ultimately he may require surgery but in the short term as his symptoms are not terribly severe on exam, I recommended cortisone injections. I also recommended continued bracing at night to mitigate his symptoms.\par The patient wishes to proceed with a cortisone injection at this time. The skin was prepped with alcohol and sprayed with Ethyl Chloride. An injection of 0.5 cc 1% Lidocaine without epinephrine, 0.25 cc Kenalog 40 mg, and 0.25 cc Dexamethasone was administered into the LEFT carpal tunnel (1). The patient tolerated the procedure well. Apply ice. \par \par Finally, with regard to the left ring finger, I recommended a cortisone injection to the flexor tendon sheath.\par The patient wishes to proceed with a cortisone injection at this time. The skin was prepped with alcohol and sprayed with Ethyl Chloride. An injection of 0.5 cc 1% Lidocaine without epinephrine, 0.25 cc Kenalog 40mg, and 0.25 cc Dexamethasone was administered into the flexor tendon sheath of the LEFT ring finger (1/3). The patient tolerated the procedure well. Apply ice. \par \par All questions answered, understanding verbalized. Patient in agreement with plan of care. Follow up in 6 weeks for repeat xrays.

## 2022-09-12 NOTE — END OF VISIT
[FreeTextEntry3] : All medical record entries made by the Scribe were at my,  Dr. Thompson Low MD., direction and personally dictated by me on 09/12/2022. I have personally reviewed the chart and agree that the record accurately reflects my personal performance of the history, physical exam, assessment and plan.

## 2022-09-15 DIAGNOSIS — M65.30 TRIGGER FINGER, UNSPECIFIED FINGER: ICD-10-CM

## 2022-10-27 ENCOUNTER — APPOINTMENT (OUTPATIENT)
Dept: ORTHOPEDIC SURGERY | Facility: CLINIC | Age: 57
End: 2022-10-27

## 2022-10-27 VITALS — WEIGHT: 198 LBS | HEIGHT: 71 IN | BODY MASS INDEX: 27.72 KG/M2

## 2022-10-27 PROCEDURE — 20526 THER INJECTION CARP TUNNEL: CPT

## 2022-10-27 PROCEDURE — 73562 X-RAY EXAM OF KNEE 3: CPT | Mod: LT

## 2022-10-27 PROCEDURE — 99213 OFFICE O/P EST LOW 20 MIN: CPT | Mod: 25

## 2022-10-27 PROCEDURE — 99072 ADDL SUPL MATRL&STAF TM PHE: CPT

## 2023-01-30 ENCOUNTER — APPOINTMENT (OUTPATIENT)
Dept: ORTHOPEDIC SURGERY | Facility: CLINIC | Age: 58
End: 2023-01-30
Payer: COMMERCIAL

## 2023-01-30 PROCEDURE — 99072 ADDL SUPL MATRL&STAF TM PHE: CPT

## 2023-01-30 PROCEDURE — 99215 OFFICE O/P EST HI 40 MIN: CPT

## 2023-01-30 PROCEDURE — 73564 X-RAY EXAM KNEE 4 OR MORE: CPT | Mod: LT

## 2023-05-01 ENCOUNTER — APPOINTMENT (OUTPATIENT)
Dept: ORTHOPEDIC SURGERY | Facility: CLINIC | Age: 58
End: 2023-05-01
Payer: COMMERCIAL

## 2023-05-01 VITALS — HEIGHT: 71 IN | WEIGHT: 195 LBS | BODY MASS INDEX: 27.3 KG/M2

## 2023-05-01 PROCEDURE — 99213 OFFICE O/P EST LOW 20 MIN: CPT

## 2023-05-01 PROCEDURE — 73564 X-RAY EXAM KNEE 4 OR MORE: CPT | Mod: LT

## 2023-05-01 NOTE — HISTORY OF PRESENT ILLNESS
[de-identified] : The patient is a 57 year male who returns for a follow up visit after undergoing Irrigation and debridement of left open tibial plateau fracture, Application of spanning external fixator for left tibial plateau across the left knee, Irrigation and debridement and repair of right knee laceration and application of left long-leg splint on 5/29/22. Removal of external fixator, I and D, left open proximal tibia fracture, Open reduction and internal fixation of left proximal shaft tibia fracture with medial and lateral plate and Application of trilaminar short leg splint to treat the foot drop on 06/04/2022. All the above procedures were performed a [facility]. He returns for repeat xrays on 5/1/23 and is walking better. He has improvements with regard to the foot drop but reports continued weakness. He complains of residual pain laterally to the knee as well as more distally along the tib/fib anteriorly. He states the pain is somewhat tolerable however it is constant. \par \par He was treated with a cortisone injection at the RIGHT carpal tunnel at his office visit on 10/27/22. \par \par Of note, he was treated at his visit on 9/12/22 with a cortisone injection at both the LEFT ring finger (1/3) and LEFT carpal tunnel (1).

## 2023-05-01 NOTE — END OF VISIT
[FreeTextEntry3] : All medical record entries made by the Scribe were at my,  Dr. Thompson Low MD., direction and personally dictated by me on 05/01/2023. I have personally reviewed the chart and agree that the record accurately reflects my personal performance of the history, physical exam, assessment and plan.

## 2023-05-01 NOTE — PHYSICAL EXAM
[de-identified] : Patient is WDWN, alert, and in no acute distress. Breathing is unlabored. He is grossly oriented to person, place, and time.\par \par He is WBAT with the assistance of a cane.\par He has a left AFO.\par \par Left Lower Extremity:\par The medial and lateral  incision sites are well healed, without signs of postoperative infection.\par There is edema and mild tenderness\par ROM to the left knee: 0°-125° \par He has improvements in active dorsiflexion although he is still not able to fully dorsiflex the ankle. Improved strength 3+ to 4/5.\par No numbness dorsally along the foot or to the toes. [de-identified] : AP, lateral and oblique views of the LEFT knee were obtained today and revealed a well aligned bicondylar tibial plateau fracture stabilized by a 14_hole 4.5 proximal lateral tibial plateau plate with percutaneous guided 4.5 cortical screws in the shaft and proximal locking 5.0 screws and fully threaded 6.5 cancellous screws proximally. In addition, a T plate with a DCP for the medial plateau and shaft. The fracture is still healing and the hardware has remained well aligned and well fixated. No hardware displacement or breakage.

## 2023-05-01 NOTE — ADDENDUM
[FreeTextEntry1] : I, Joyce Montano wrote this note acting as a scribe for Dr. Thompson Low on May 01, 2023.

## 2023-05-01 NOTE — DISCUSSION/SUMMARY
[de-identified] : The underlying pathophysiology was reviewed with the patient. XR films were reviewed with the patient. Discussed at length the nature of the patient’s condition. \par \par At this time, I discussed with him that the fracture is still healing, and ideally I would like to see further healing at this point in recovery however it is a good sign that the hardware has not failed and that his overall pain is improving. I told him that I would recommend a CAT scan to evaluate for nonunion/fracture healing. Furthermore, if he is considering hardware removal, I would also advise a CAT scan to ensure that the fracture is fully healed prior to the removal of hardware.\par A CT scan (with substraction of metal) of the LEFT tib/fib was ordered to evaluate for nonunion of proximal tibia fracture. Patient will follow-up to review the results and discuss further treatment recommendations.\par In the interim, he was instructed to continue with a home exercise program in addition to out-patient physical therapy. He was provided with an updated referral to physical therapy. I recommended low impact activities such as walking and use of a stationary bike as well as quadriceps strengthening with leg lift exercises. \par \par With regard to the left foot drop, I did tell him that I am pleased with his progress and that although I cannot guarantee he will continue to improve, maximum improvement can take up to 1 year. He did ask me about an EMG for further evaluation, which I told him  the EMG test will not add anything to the treatment, especially since there is clinical evidence of functional return.\par \par All questions answered, understanding verbalized. Patient in agreement with plan of care. Follow up after CT scan.

## 2023-05-01 NOTE — REASON FOR VISIT
[Follow-Up Visit] : a follow-up visit for [No Fault] : This visit is related to no fault  [FreeTextEntry2] : s/p Irrigation and debridement of left open tibial plateau fracture, Application of spanning external fixator for left tibial plateau across the left knee, Irrigation and debridement and repair of right knee laceration and application of left long-leg splint on 5/29/22. Removal of external fixator, I and D, left open proximal tibia fracture, Open reduction and internal fixation of left proximal shaft tibia fracture with medial and lateral plate and Application of trilaminar short leg splint to treat the foot drop on 06/04/2022.

## 2023-05-02 ENCOUNTER — RESULT REVIEW (OUTPATIENT)
Age: 58
End: 2023-05-02

## 2023-05-02 ENCOUNTER — OUTPATIENT (OUTPATIENT)
Dept: OUTPATIENT SERVICES | Facility: HOSPITAL | Age: 58
LOS: 1 days | End: 2023-05-02
Payer: SELF-PAY

## 2023-05-02 ENCOUNTER — APPOINTMENT (OUTPATIENT)
Dept: CT IMAGING | Facility: CLINIC | Age: 58
End: 2023-05-02
Payer: COMMERCIAL

## 2023-05-02 DIAGNOSIS — Z98.89 OTHER SPECIFIED POSTPROCEDURAL STATES: Chronic | ICD-10-CM

## 2023-05-02 DIAGNOSIS — N13.5 CROSSING VESSEL AND STRICTURE OF URETER WITHOUT HYDRONEPHROSIS: Chronic | ICD-10-CM

## 2023-05-02 DIAGNOSIS — H57.8 OTHER SPECIFIED DISORDERS OF EYE AND ADNEXA: Chronic | ICD-10-CM

## 2023-05-02 DIAGNOSIS — Z00.00 ENCOUNTER FOR GENERAL ADULT MEDICAL EXAMINATION WITHOUT ABNORMAL FINDINGS: ICD-10-CM

## 2023-05-02 PROCEDURE — 73700 CT LOWER EXTREMITY W/O DYE: CPT

## 2023-05-02 PROCEDURE — 73700 CT LOWER EXTREMITY W/O DYE: CPT | Mod: 26,LT

## 2023-05-02 PROCEDURE — 76376 3D RENDER W/INTRP POSTPROCES: CPT | Mod: 26

## 2023-05-02 PROCEDURE — 76376 3D RENDER W/INTRP POSTPROCES: CPT

## 2023-05-11 ENCOUNTER — APPOINTMENT (OUTPATIENT)
Dept: ORTHOPEDIC SURGERY | Facility: CLINIC | Age: 58
End: 2023-05-11
Payer: COMMERCIAL

## 2023-05-11 PROCEDURE — 99213 OFFICE O/P EST LOW 20 MIN: CPT

## 2023-05-11 NOTE — HISTORY OF PRESENT ILLNESS
[de-identified] : The patient is a 57 year male who returns for a follow up visit after undergoing Irrigation and debridement of left open tibial plateau fracture, Application of spanning external fixator for left tibial plateau across the left knee, Irrigation and debridement and repair of right knee laceration and application of left long-leg splint on 5/29/22. Removal of external fixator, I and D, left open proximal tibia fracture, Open reduction and internal fixation of left proximal shaft tibia fracture with medial and lateral plate and Application of trilaminar short leg splint to treat the foot drop on 06/04/2022. All the above procedures were [facility]. He returns for CT scan review on 5/11/23. He is having continued pain to the left knee and entire lower extremity. Although the pain is somewhat tolerable, it is constant in nature. \par \par He was treated with a cortisone injection at the RIGHT carpal tunnel at his office visit on 10/27/22. \par \par Of note, he was treated at his visit on 9/12/22 with a cortisone injection at both the LEFT ring finger (1/3) and LEFT carpal tunnel (1).

## 2023-05-11 NOTE — END OF VISIT
[FreeTextEntry3] : All medical record entries made by the Scribe were at my,  Dr. Thompson Low MD., direction and personally dictated by me on 05/11/2023. I have personally reviewed the chart and agree that the record accurately reflects my personal performance of the history, physical exam, assessment and plan.

## 2023-05-11 NOTE — PHYSICAL EXAM
[de-identified] : Patient is WDWN, alert, and in no acute distress. Breathing is unlabored. He is grossly oriented to person, place, and time.\par \par He is WBAT with the assistance of a cane.\par He has a left AFO.\par \par Left Lower Extremity:\par The medial and lateral  incision sites are well healed, without signs of postoperative infection.\par There is edema and mild tenderness\par ROM to the left knee: 0°-125° \par He has improvements in active dorsiflexion although he is still not able to fully dorsiflex the ankle. Improved strength 3+ to 4/5.\par No numbness dorsally along the foot or to the toes. [de-identified] : EXAM: CT 3D RECONSTRUCT MIRIAN ALEX\par EXAM: CT TIB FIB ONLY LT\par PROCEDURE DATE:  05/02/2023\par IMPRESSION:\par Prior left proximal tibial shaft fracture ORIF with persistent of the fracture line and osseous bridging medially. Hardware is intact. Posttraumatic deformity of the proximal fibular shaft. Osteopenia.\par \par MAYA OAKLEY MD; Attending Radiologist\par This document has been electronically signed. May  7 2023 10:33PM\par \par ------------------------------------------------------------------------------------------------------------------------------------------------------------------------------------ \par \par AP, lateral and oblique views of the LEFT knee were obtained today and revealed a well aligned bicondylar tibial plateau fracture stabilized by a 14_hole 4.5 proximal lateral tibial plateau plate with percutaneous guided 4.5 cortical screws in the shaft and proximal locking 5.0 screws and fully threaded 6.5 cancellous screws proximally. In addition, a T plate with a DCP for the medial plateau and shaft. The fracture is still healing and the hardware has remained well aligned and well fixated. No hardware displacement or breakage.

## 2023-05-11 NOTE — DISCUSSION/SUMMARY
[de-identified] : The underlying pathophysiology was reviewed with the patient. CT films were reviewed with the patient. Discussed at length the nature of the patient’s condition. \par \par At this time, I reviewed his CT scan with him in great detail. I discussed with him that based upon the findings seen on the CT scan and my review of the imaging, it is concerning as there are parts of the fracture that appear not to be healed. I explained to him that this is common in cases such as his, where the injury was compound in nature. We discussed further treatment options of continuing with observation to see if the bone continues to heal versus subsequent surgery to place bone graft however the hardware will remain in place, given that the fracture is not yet healed. Once the fracture heals, the hardware can then be removed. The expected recovery and postoperative protocols were discussed in great detail, to which I told him he will require 6 weeks of NWB. He understands all of the above discussed treatment options and is in agreement to proceed with surgery. Finally, he did also ask me about the probability of developing post-traumatic arthritis.I told him that while there are no guarantees, the initial injury was extraarticular, which does dictate that there is a lower chance of developing post-traumatic arthritis.\par \par The patient wishes to proceed with bone grafting of left tibia nonunion at this time. The risks and benefits were reviewed with the patient. All of his questions were answered. He will meet with our surgical scheduler and be scheduled for surgery in the near future, when it is convenient for him.

## 2023-05-11 NOTE — ADDENDUM
[FreeTextEntry1] : I, Joyce Montano wrote this note acting as a scribe for Dr. Thompson Low on May 11, 2023.

## 2023-06-02 ENCOUNTER — OUTPATIENT (OUTPATIENT)
Dept: OUTPATIENT SERVICES | Facility: HOSPITAL | Age: 58
LOS: 1 days | End: 2023-06-02
Payer: COMMERCIAL

## 2023-06-02 VITALS
TEMPERATURE: 98 F | HEIGHT: 71 IN | OXYGEN SATURATION: 96 % | WEIGHT: 216.05 LBS | SYSTOLIC BLOOD PRESSURE: 142 MMHG | DIASTOLIC BLOOD PRESSURE: 93 MMHG | HEART RATE: 74 BPM

## 2023-06-02 DIAGNOSIS — Z01.818 ENCOUNTER FOR OTHER PREPROCEDURAL EXAMINATION: ICD-10-CM

## 2023-06-02 DIAGNOSIS — S82.262K: ICD-10-CM

## 2023-06-02 DIAGNOSIS — Z98.89 OTHER SPECIFIED POSTPROCEDURAL STATES: Chronic | ICD-10-CM

## 2023-06-02 DIAGNOSIS — H57.8 OTHER SPECIFIED DISORDERS OF EYE AND ADNEXA: Chronic | ICD-10-CM

## 2023-06-02 DIAGNOSIS — S82.262P: ICD-10-CM

## 2023-06-02 DIAGNOSIS — N13.5 CROSSING VESSEL AND STRICTURE OF URETER WITHOUT HYDRONEPHROSIS: Chronic | ICD-10-CM

## 2023-06-02 DIAGNOSIS — Z98.890 OTHER SPECIFIED POSTPROCEDURAL STATES: Chronic | ICD-10-CM

## 2023-06-02 LAB
ANION GAP SERPL CALC-SCNC: 9 MMOL/L — SIGNIFICANT CHANGE UP (ref 5–17)
BUN SERPL-MCNC: 22 MG/DL — SIGNIFICANT CHANGE UP (ref 7–23)
CALCIUM SERPL-MCNC: 10 MG/DL — SIGNIFICANT CHANGE UP (ref 8.4–10.5)
CHLORIDE SERPL-SCNC: 103 MMOL/L — SIGNIFICANT CHANGE UP (ref 96–108)
CO2 SERPL-SCNC: 28 MMOL/L — SIGNIFICANT CHANGE UP (ref 22–31)
CREAT SERPL-MCNC: 1.03 MG/DL — SIGNIFICANT CHANGE UP (ref 0.5–1.3)
EGFR: 84 ML/MIN/1.73M2 — SIGNIFICANT CHANGE UP
GLUCOSE SERPL-MCNC: 110 MG/DL — HIGH (ref 70–99)
HCT VFR BLD CALC: 46.3 % — SIGNIFICANT CHANGE UP (ref 39–50)
HGB BLD-MCNC: 16 G/DL — SIGNIFICANT CHANGE UP (ref 13–17)
MCHC RBC-ENTMCNC: 31 PG — SIGNIFICANT CHANGE UP (ref 27–34)
MCHC RBC-ENTMCNC: 34.6 GM/DL — SIGNIFICANT CHANGE UP (ref 32–36)
MCV RBC AUTO: 89.7 FL — SIGNIFICANT CHANGE UP (ref 80–100)
NRBC # BLD: 0 /100 WBCS — SIGNIFICANT CHANGE UP (ref 0–0)
PLATELET # BLD AUTO: 325 K/UL — SIGNIFICANT CHANGE UP (ref 150–400)
POTASSIUM SERPL-MCNC: 4.4 MMOL/L — SIGNIFICANT CHANGE UP (ref 3.5–5.3)
POTASSIUM SERPL-SCNC: 4.4 MMOL/L — SIGNIFICANT CHANGE UP (ref 3.5–5.3)
RBC # BLD: 5.16 M/UL — SIGNIFICANT CHANGE UP (ref 4.2–5.8)
RBC # FLD: 13.1 % — SIGNIFICANT CHANGE UP (ref 10.3–14.5)
SODIUM SERPL-SCNC: 140 MMOL/L — SIGNIFICANT CHANGE UP (ref 135–145)
WBC # BLD: 5.78 K/UL — SIGNIFICANT CHANGE UP (ref 3.8–10.5)
WBC # FLD AUTO: 5.78 K/UL — SIGNIFICANT CHANGE UP (ref 3.8–10.5)

## 2023-06-02 PROCEDURE — 85027 COMPLETE CBC AUTOMATED: CPT

## 2023-06-02 PROCEDURE — G0463: CPT

## 2023-06-02 PROCEDURE — 36415 COLL VENOUS BLD VENIPUNCTURE: CPT

## 2023-06-02 PROCEDURE — 93010 ELECTROCARDIOGRAM REPORT: CPT

## 2023-06-02 PROCEDURE — 80048 BASIC METABOLIC PNL TOTAL CA: CPT

## 2023-06-02 PROCEDURE — 93005 ELECTROCARDIOGRAM TRACING: CPT

## 2023-06-02 NOTE — H&P PST ADULT - PROBLEM SELECTOR PROBLEM 1
Displaced segmental fracture of shaft of left tibia, subsequent encounter for closed fracture with malunion

## 2023-06-02 NOTE — H&P PST ADULT - NSICDXPASTMEDICALHX_GEN_ALL_CORE_FT
PAST MEDICAL HISTORY:  BPH (benign prostatic hyperplasia)     Displaced segmental fracture of shaft of left tibia, subsequent encounter for closed fracture with malunion     H/O fracture of tibia     UPJ (ureteropelvic junction) obstruction

## 2023-06-02 NOTE — H&P PST ADULT - MUSCULOSKELETAL
details… no joint swelling/no joint erythema/no joint warmth/no calf tenderness/decreased strength/abnormal gait

## 2023-06-02 NOTE — H&P PST ADULT - PROBLEM SELECTOR PLAN 2
Labs CBC, BMP and EKG  MC with Dr. Dorsey  Pre op and Hibiclens instructions reviewed and given. No meds to take am of surgery. Instructed to hold and/or avoid other NSAIDs and OTC supplements. Tylenol is ok. Verbalized understanding
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
## 2023-06-02 NOTE — H&P PST ADULT - PAIN SITE
Problem: Postpartum  Goal: Experiences normal postpartum course  Description:  Postpartum OB-Pregnancy care plan goal which identifies if the mother is experiencing a normal postpartum course  2022 1256 by Jair Michaels RN  Outcome: Progressing     Problem: Postpartum  Goal: Appropriate maternal -  bonding  Description:  Postpartum OB-Pregnancy care plan goal which identifies if the mother and  are bonding appropriately  2022 1256 by Jair Michaels RN  Outcome: Progressing     Problem: Postpartum  Goal: Establishment of infant feeding pattern  Description:  Postpartum OB-Pregnancy care plan goal which identifies if the mother is establishing a feeding pattern with their   2022 1256 by Jair Michaels RN  Outcome: Progressing left leg

## 2023-06-02 NOTE — H&P PST ADULT - NSICDXPASTSURGICALHX_GEN_ALL_CORE_FT
PAST SURGICAL HISTORY:  Cyst of eye Excision    History of open reduction and internal fixation (ORIF) procedure     S/P laparoscopic procedure s/p pyeloplasty- left, suprapubic catheter insertion & left stent insertion march 2014    UPJ (ureteropelvic junction) obstruction s/p nephrostomy, feb 2014

## 2023-06-13 RX ORDER — APREPITANT 80 MG/1
40 CAPSULE ORAL ONCE
Refills: 0 | Status: DISCONTINUED | OUTPATIENT
Start: 2023-06-16 | End: 2023-06-30

## 2023-06-13 RX ORDER — CHLORHEXIDINE GLUCONATE 213 G/1000ML
1 SOLUTION TOPICAL DAILY
Refills: 0 | Status: DISCONTINUED | OUTPATIENT
Start: 2023-06-16 | End: 2023-06-30

## 2023-06-15 ENCOUNTER — TRANSCRIPTION ENCOUNTER (OUTPATIENT)
Age: 58
End: 2023-06-15

## 2023-06-16 ENCOUNTER — TRANSCRIPTION ENCOUNTER (OUTPATIENT)
Age: 58
End: 2023-06-16

## 2023-06-16 ENCOUNTER — APPOINTMENT (OUTPATIENT)
Dept: ORTHOPEDIC SURGERY | Facility: HOSPITAL | Age: 58
End: 2023-06-16

## 2023-06-16 ENCOUNTER — OUTPATIENT (OUTPATIENT)
Dept: OUTPATIENT SERVICES | Facility: HOSPITAL | Age: 58
LOS: 1 days | End: 2023-06-16
Payer: COMMERCIAL

## 2023-06-16 VITALS
WEIGHT: 214.73 LBS | RESPIRATION RATE: 17 BRPM | HEIGHT: 71 IN | TEMPERATURE: 99 F | OXYGEN SATURATION: 98 % | HEART RATE: 72 BPM

## 2023-06-16 VITALS
HEART RATE: 80 BPM | DIASTOLIC BLOOD PRESSURE: 98 MMHG | SYSTOLIC BLOOD PRESSURE: 145 MMHG | OXYGEN SATURATION: 98 % | RESPIRATION RATE: 18 BRPM

## 2023-06-16 DIAGNOSIS — Z01.818 ENCOUNTER FOR OTHER PREPROCEDURAL EXAMINATION: ICD-10-CM

## 2023-06-16 DIAGNOSIS — S82.262K: ICD-10-CM

## 2023-06-16 DIAGNOSIS — Z98.890 OTHER SPECIFIED POSTPROCEDURAL STATES: Chronic | ICD-10-CM

## 2023-06-16 DIAGNOSIS — N13.5 CROSSING VESSEL AND STRICTURE OF URETER WITHOUT HYDRONEPHROSIS: Chronic | ICD-10-CM

## 2023-06-16 DIAGNOSIS — H57.8 OTHER SPECIFIED DISORDERS OF EYE AND ADNEXA: Chronic | ICD-10-CM

## 2023-06-16 DIAGNOSIS — Z98.89 OTHER SPECIFIED POSTPROCEDURAL STATES: Chronic | ICD-10-CM

## 2023-06-16 PROCEDURE — 27724 REPAIR/GRAFT OF TIBIA: CPT | Mod: RT

## 2023-06-16 PROCEDURE — C1713: CPT

## 2023-06-16 PROCEDURE — 76000 FLUOROSCOPY <1 HR PHYS/QHP: CPT

## 2023-06-16 PROCEDURE — 27724 REPAIR/GRAFT OF TIBIA: CPT | Mod: LT

## 2023-06-16 PROCEDURE — 97161 PT EVAL LOW COMPLEX 20 MIN: CPT

## 2023-06-16 PROCEDURE — C1889: CPT

## 2023-06-16 DEVICE — PLATE LCP 8H 3.5X111MM: Type: IMPLANTABLE DEVICE | Site: LEFT | Status: FUNCTIONAL

## 2023-06-16 DEVICE — SCREW CORT S-T 3.5X24MM: Type: IMPLANTABLE DEVICE | Site: LEFT | Status: FUNCTIONAL

## 2023-06-16 DEVICE — SCREW CORT S-T 3.5X50MM: Type: IMPLANTABLE DEVICE | Site: LEFT | Status: FUNCTIONAL

## 2023-06-16 DEVICE — SCREW CANC 3.5X46: Type: IMPLANTABLE DEVICE | Site: LEFT | Status: FUNCTIONAL

## 2023-06-16 DEVICE — PLATE 1/3 TUB LCP W/COLLAR 7H 81MM: Type: IMPLANTABLE DEVICE | Site: LEFT | Status: FUNCTIONAL

## 2023-06-16 DEVICE — SCREW CORT S-T 3.5X32MM: Type: IMPLANTABLE DEVICE | Site: LEFT | Status: FUNCTIONAL

## 2023-06-16 DEVICE — SCREW CORT S-T 3.5X30MM: Type: IMPLANTABLE DEVICE | Site: LEFT | Status: FUNCTIONAL

## 2023-06-16 DEVICE — SCREW CORT S-T 3.5X55MM: Type: IMPLANTABLE DEVICE | Site: LEFT | Status: FUNCTIONAL

## 2023-06-16 DEVICE — SCREW CORT S-T 3.5X34MM: Type: IMPLANTABLE DEVICE | Site: LEFT | Status: FUNCTIONAL

## 2023-06-16 DEVICE — SCREW CORT S-T 3.5X36MM: Type: IMPLANTABLE DEVICE | Site: LEFT | Status: FUNCTIONAL

## 2023-06-16 DEVICE — SCREW CORT S-T 3.5X60MM: Type: IMPLANTABLE DEVICE | Site: LEFT | Status: FUNCTIONAL

## 2023-06-16 RX ORDER — OXYCODONE AND ACETAMINOPHEN 5; 325 MG/1; MG/1
1 TABLET ORAL
Qty: 20 | Refills: 0
Start: 2023-06-16

## 2023-06-16 RX ORDER — HYDROMORPHONE HYDROCHLORIDE 2 MG/ML
0.5 INJECTION INTRAMUSCULAR; INTRAVENOUS; SUBCUTANEOUS
Refills: 0 | Status: DISCONTINUED | OUTPATIENT
Start: 2023-06-16 | End: 2023-06-19

## 2023-06-16 RX ORDER — ACETAMINOPHEN 500 MG
1000 TABLET ORAL ONCE
Refills: 0 | Status: COMPLETED | OUTPATIENT
Start: 2023-06-16 | End: 2023-06-16

## 2023-06-16 RX ORDER — SODIUM CHLORIDE 9 MG/ML
1000 INJECTION, SOLUTION INTRAVENOUS
Refills: 0 | Status: DISCONTINUED | OUTPATIENT
Start: 2023-06-16 | End: 2023-06-19

## 2023-06-16 RX ORDER — IBUPROFEN 200 MG
1 TABLET ORAL
Qty: 60 | Refills: 0
Start: 2023-06-16

## 2023-06-16 RX ORDER — OXYCODONE AND ACETAMINOPHEN 5; 325 MG/1; MG/1
1 TABLET ORAL ONCE
Refills: 0 | Status: DISCONTINUED | OUTPATIENT
Start: 2023-06-16 | End: 2023-06-19

## 2023-06-16 RX ORDER — IBUPROFEN 200 MG
1 TABLET ORAL
Qty: 30 | Refills: 0
Start: 2023-06-16

## 2023-06-16 RX ORDER — HYDROMORPHONE HYDROCHLORIDE 2 MG/ML
0.25 INJECTION INTRAMUSCULAR; INTRAVENOUS; SUBCUTANEOUS
Refills: 0 | Status: DISCONTINUED | OUTPATIENT
Start: 2023-06-16 | End: 2023-06-19

## 2023-06-16 RX ORDER — CEFAZOLIN SODIUM 1 G
2000 VIAL (EA) INJECTION ONCE
Refills: 0 | Status: COMPLETED | OUTPATIENT
Start: 2023-06-16 | End: 2023-06-16

## 2023-06-16 RX ORDER — ONDANSETRON 8 MG/1
4 TABLET, FILM COATED ORAL ONCE
Refills: 0 | Status: DISCONTINUED | OUTPATIENT
Start: 2023-06-16 | End: 2023-06-19

## 2023-06-16 RX ADMIN — HYDROMORPHONE HYDROCHLORIDE 0.5 MILLIGRAM(S): 2 INJECTION INTRAMUSCULAR; INTRAVENOUS; SUBCUTANEOUS at 14:51

## 2023-06-16 RX ADMIN — HYDROMORPHONE HYDROCHLORIDE 0.5 MILLIGRAM(S): 2 INJECTION INTRAMUSCULAR; INTRAVENOUS; SUBCUTANEOUS at 15:20

## 2023-06-16 RX ADMIN — HYDROMORPHONE HYDROCHLORIDE 0.5 MILLIGRAM(S): 2 INJECTION INTRAMUSCULAR; INTRAVENOUS; SUBCUTANEOUS at 15:05

## 2023-06-16 RX ADMIN — CHLORHEXIDINE GLUCONATE 1 APPLICATION(S): 213 SOLUTION TOPICAL at 11:44

## 2023-06-16 RX ADMIN — APREPITANT 40 MILLIGRAM(S): 80 CAPSULE ORAL at 11:44

## 2023-06-16 RX ADMIN — SODIUM CHLORIDE 75 MILLILITER(S): 9 INJECTION, SOLUTION INTRAVENOUS at 14:51

## 2023-06-16 NOTE — PHYSICAL THERAPY INITIAL EVALUATION ADULT - RANGE OF MOTION EXAMINATION, REHAB EVAL
LLE not assessed due to surgery/bilateral upper extremity ROM was WFL (within functional limits)/bilateral lower extremity ROM was WFL (within functional limits)

## 2023-06-16 NOTE — ASU DISCHARGE PLAN (ADULT/PEDIATRIC) - ASU DC SPECIAL INSTRUCTIONSFT
DO NOT wet or remove the splint/dressing.  Elevate the extremity to reduce swelling.  No strenuous activities or heavy lifting.    Please follow Dr. Low's instructions.    Follow up in the office within 10 to 14 days.  Please call to confirm the appointment. DO NOT wet or remove the dressing.  Elevate the extremity to reduce swelling.  Apply ice 20 minutes on 20 minutes off as needed.  NO weight bearing on the left leg.  Keep left leg in the knee immobilizer at all times.    Please follow Dr. Low's instructions.    Follow up in the office in 14 days.  Please call to confirm the appointment. DO NOT wet or remove the dressing.  Elevate the extremity to reduce swelling.  Apply ice 20 minutes on 20 minutes off as needed.  NO weight bearing on the left leg.  Keep left leg in the knee immobilizer at all times.  Sutures in both surgical incision to be removed in the office in 2 weeks.    Please follow Dr. Low's instructions.    Follow up in the office in 14 days.  Please call to confirm the appointment.

## 2023-06-16 NOTE — BRIEF OPERATIVE NOTE - NSICDXBRIEFPROCEDURE_GEN_ALL_CORE_FT
PROCEDURES:  Open reduction, fracture, tibia, shaft 16-Jun-2023 11:43:06  Chad Rojas  Aspiration, bone marrow, iliac crest 16-Jun-2023 11:43:34  Chad Rojas

## 2023-06-16 NOTE — PHYSICAL THERAPY INITIAL EVALUATION ADULT - PATIENT/FAMILY/SIGNIFICANT OTHER GOALS STATEMENT, PT EVAL
[facility]    [address] [address]    Phone:  [phone]       Thank You for choosing us for your health care visit. We are glad to serve you and happy to provide you with this summary of your visit. Please help us to ensure we have accurate records. If you find anything that needs to be changed, please let our staff know as soon as possible.          Your Demographic Information     Patient Name Sex Jeison Teague Male 2007       Ethnic Group Patient Race    Not of  or  Origin White      Your Visit Details     Date & Time Provider Department    2017 4:00 PM LUSI ANGEL Reyes [facility]      We Ordered or Performed the Following     TETANUS/DIPHTHERIA/ACELLULAR PERTUSSIS 10+ (BOOSTRIX)       Conditions Discussed Today or Order-Related Diagnoses        Comments    Laceration of right knee, initial encounter    -  Primary       Your Vitals Were     BP Pulse Temp Resp Height Weight    102/64 (34 %/ 54 %)* 78 98.4 °F (36.9 °C) (Tympanic) 16 4' 11\" (1.499 m) (75 %, Z= 0.67)† 94 lb (42.6 kg) (75 %, Z= 0.66)†    BMI Smoking Status                18.99 kg/m2 (73 %, Z= 0.63)† Never Smoker        *BP percentiles are based on NHBPEP's 4th Report    †Growth percentiles are based on CDC 2-20 Years data.      Medications Prescribed or Re-Ordered Today     None      Your Current Medications Are        Disp Refills Start End    UNKNOWN TO PATIENT        Sig: every 30 days. Indications: Seasonal Allergy Seasonal allergy injection.    Class: Historical Med      Allergies     Seasonal Other (See Comments)    Watery eyes, congestion              Patient Instructions    Wound Care    Keep the wound covered, clean, and dry ,especially when exposed to a dirty environment.  Cover - The covering may be a bandage or simple Band-Aid  Clean - Dirty bandages invite infection  Dry    - Wet bandages invite  infection    Change the wound covering once daily.  Inspect the wound for signs of infection:  Redness - There may be minimal redness at the edge of the cut but if the redness spreads call your physician.  Pain - A laceration that is sutured is not much more painful than a shallow cut.  If repeated doses of pain medicine are required, or mobility becomes difficult, call for advice.  Swelling - There may be minimal swelling near the wound edge, especially the day after the cut, but if the swelling keeps increasing, call for advice.    *-*-*-*-*-*-*-  [facility]    Monday - Thursday 8 a.m. - 8 p.m.  Friday                        8 a.m. - 6 p.m.    Saturday (and holidays) 8 a.m. - 4 p.m.  Sunday                                     Closed  *-*-*-*-*-*-*-  [email or web address]  See current wait times for [facility] in real-time!  Reserve your waiting-room spot in line!   Receive text/email messages if our wait times are long,  so that you can do your waiting at home or work, instead of in our waiting room.     Note: This system does not guarantee an \"appointment time\" to see a provider. Also, patients may be called out of the waiting room \"ahead of turn\" in emergency situations, at discretion of Urgent Care staff.  ----------------  Thank you for choosing [facility] today. We hope you had a pleasant experience and we look forward to serving your future needs.    · If you have any questions about your VISIT, please call [phone].    · If you have any questions about your BILL, please call [phone].    · If you need a copy of your MEDICAL RECORD, please call [phone].    If you receive a survey in the mail  or email about today's services, we hope that you will take a few minutes to let us know about your experience.  --------------------------------------------------------------------------------------------------------------  UNLESS OTHERWISE INSTRUCTED BY YOUR  URGENT CARE PROVIDER TODAY, all follow-up for your medical issues should be managed by your primary care provider. The Urgent Care does not manage chronic medical issues or refill medications. You are responsible for scheduling and keeping any necessary follow-up visits with your primary care provider after this visit today.   ------------------------------------------------------------------------------------------------------------            to go home

## 2023-06-16 NOTE — ASU DISCHARGE PLAN (ADULT/PEDIATRIC) - NS MD DC FALL RISK RISK
For information on Fall & Injury Prevention, visit: [email or web address] OR  [email or web address] OR  https://www.cdc.gov/steadi/patient.html

## 2023-06-16 NOTE — PHYSICAL THERAPY INITIAL EVALUATION ADULT - NSACTIVITYREC_GEN_A_PT
Pt is independent with transfers and ambulation with b/l axillary crutches. Educated pt on ascending/descending stairs via bumping up/down on bottom, pt verbally understood and reports he has done that in following previous surgeries. Pt is able to maintain LLE NWB throughout. Pt requires no skilled PT and is cleared from PT services for dc.

## 2023-06-16 NOTE — ASU DISCHARGE PLAN (ADULT/PEDIATRIC) - CALL YOUR DOCTOR IF YOU HAVE ANY OF THE FOLLOWING:
Bleeding that does not stop/Swelling that gets worse/Pain not relieved by Medications/Fever greater than (need to indicate Fahrenheit or Celsius)/Wound/Surgical Site with redness, or foul smelling discharge or pus Bleeding that does not stop/Swelling that gets worse/Pain not relieved by Medications/Fever greater than (need to indicate Fahrenheit or Celsius)/Wound/Surgical Site with redness, or foul smelling discharge or pus/Numbness, tingling, color or temperature change to extremity/Nausea and vomiting that does not stop

## 2023-06-16 NOTE — PHYSICAL THERAPY INITIAL EVALUATION ADULT - ADDITIONAL COMMENTS
Pt lives with wife in a private home, there are 3 stairs to enter and 12 stairs +HR to the primary bedroom. Pt reports he is able to stay on the main floor. PTA pt was independent with ADLs and ambulation with straight cane. Pt reports he uses a straight cane due to previous injury/surgery.

## 2023-06-16 NOTE — ASU PATIENT PROFILE, ADULT - FALL HARM RISK - UNIVERSAL INTERVENTIONS
Bed in lowest position, wheels locked, appropriate side rails in place/Call bell, personal items and telephone in reach/Instruct patient to call for assistance before getting out of bed or chair/Non-slip footwear when patient is out of bed/[place] to call system/Physically safe environment - no spills, clutter or unnecessary equipment/Purposeful Proactive Rounding/Room/bathroom lighting operational, light cord in reach

## 2023-06-16 NOTE — ASU DISCHARGE PLAN (ADULT/PEDIATRIC) - CARE PROVIDER_API CALL
Thompson Low  Orthopaedic Surgery  [address], [address]  [place] [address]  Phone: [phone]  Fax: [phone]  Follow Up Time:

## 2023-06-29 ENCOUNTER — APPOINTMENT (OUTPATIENT)
Dept: ORTHOPEDIC SURGERY | Facility: CLINIC | Age: 58
End: 2023-06-29
Payer: COMMERCIAL

## 2023-06-29 VITALS — WEIGHT: 200 LBS | BODY MASS INDEX: 28 KG/M2 | HEIGHT: 71 IN

## 2023-06-29 PROCEDURE — 73562 X-RAY EXAM OF KNEE 3: CPT | Mod: LT

## 2023-06-29 PROCEDURE — 99024 POSTOP FOLLOW-UP VISIT: CPT

## 2023-06-29 PROCEDURE — 73590 X-RAY EXAM OF LOWER LEG: CPT | Mod: LT

## 2023-06-29 NOTE — END OF VISIT
[FreeTextEntry3] : All medical record entries made by the Scribe were at my,  Dr. Thompson Low MD., direction and personally dictated by me on 06/29/2023. I have personally reviewed the chart and agree that the record accurately reflects my personal performance of the history, physical exam, assessment and plan.

## 2023-06-29 NOTE — HISTORY OF PRESENT ILLNESS
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 The surgery was on 06/16/2023. The patient is recovering at home. He is not currently taking medication for pain postoperatively. He notes he did take Ibuprofen postoperatively, which controlled his pain well. \par \par He is s/p Irrigation and debridement of left open tibial plateau fracture, Application of spanning external fixator for left tibial plateau across the left knee, Irrigation and debridement and repair of right knee laceration, and application of left long-leg splint on 05/29/2022.\par \par He is s/p Removal of external fixator, I and D, left open proximal tibia fracture, Open reduction and internal fixation of left proximal shaft tibia fracture with medial and lateral plate and Application of trilaminar short leg splint to treat the foot drop on 06/04/2022.  [de-identified] : Patient is WDWN, alert, and in no acute distress. Breathing is unlabored. He is grossly oriented to person, place, and time.\par \par He is accompanied by his wife today. \par \par He presents to the office NWB, with the assistance of crutches. Knee immobilizer brace and Bulky Galvan dressing place, which were removed.\par \par Left Knee:\par Nylon sutures in place, which were removed in the office today.\par Incision sites are healing well, without signs of postoperative infection noted.\par Postoperative edema and tenderness presents.\par ROM to the left knee: 0°-105°   [de-identified] : AP, lateral and oblique views of the LEFT knee were obtained today and revealed a well aligned bicondylar tibial plateau fracture stabilized by a seven hole one third tubular plate, 3.5 LC-DC plate and bone grafting. The hardware as well as fracture are well aligned. \par \par AP and lateral views of the LEFT tib/fib were obtained today and revealed a well aligned bicondylar tibial plateau fracture stabilized by a seven hole one third tubular plate, 3.5 LC-DC plate and bone grafting. The fracture and hardware are well aligned.  [de-identified] : At this time, I reviewed his xrays in great detail. I explained to him that I added further hardware and replaced the screws that had broken in addition to placing bone graft. I did tell him that the main reason as to why the fracture did not initially heal after the initial surgery was likely due to the compound nature of the injury, especially given that he is not a smoker or diabetic, which I told him are factors in some cases that may slow down fracture healing.\par \par The sutures were removed. He was instructed on local wound care and when to begin to use Vitamin E oil on the scar. He will keep the leg dry until tomorrow. I told him that in effort to give this the best chance to heal, I would recommend he remain as NWB for at least 6 weeks from the date of his most recent surgery. He will utilize a knee immobilizer brace when standing but may remove it when seated. I did however recommend range of motion exercises of the knee into flexion and extension. I additionally recommended leg lift exercises for quadriceps strengthening. He may begin use of a stationary bike next week in about 4 or 5 days from today's date. \par \par Follow up in 4 weeks for repeat xrays.

## 2023-06-29 NOTE — ADDENDUM
[FreeTextEntry1] : I, Joyce Montano wrote this note acting as a scribe for Dr. Thompson Low on Jun 29, 2023.

## 2023-07-27 ENCOUNTER — APPOINTMENT (OUTPATIENT)
Dept: ORTHOPEDIC SURGERY | Facility: CLINIC | Age: 58
End: 2023-07-27
Payer: COMMERCIAL

## 2023-07-27 PROCEDURE — 99024 POSTOP FOLLOW-UP VISIT: CPT

## 2023-07-27 PROCEDURE — 73590 X-RAY EXAM OF LOWER LEG: CPT | Mod: LT

## 2023-07-27 PROCEDURE — 73564 X-RAY EXAM KNEE 4 OR MORE: CPT | Mod: LT

## 2023-07-27 RX ORDER — CEFADROXIL 500 MG/1
500 CAPSULE ORAL
Qty: 28 | Refills: 0 | Status: ACTIVE | COMMUNITY
Start: 2023-07-27 | End: 1900-01-01

## 2023-07-27 NOTE — END OF VISIT
[FreeTextEntry3] : All medical record entries made by the Scribe were at my,  Dr. Thompson Low MD., direction and personally dictated by me on 07/27/2023. I have personally reviewed the chart and agree that the record accurately reflects my personal performance of the history, physical exam, assessment and plan.

## 2023-07-27 NOTE — HISTORY OF PRESENT ILLNESS
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 The surgery was on 06/16/2023. He is having pain as well as swelling. He notes pain medially and posteriorly. He takes Ibuprofen 600mg as needed, roughly about once daily. He states he was recently seen by a vascular doctor and two small clots were found below the knee. He is now on Xarelto. \par \par He is s/p Irrigation and debridement of left open tibial plateau fracture, Application of spanning external fixator for left tibial plateau across the left knee, Irrigation and debridement and repair of right knee laceration, and application of left long-leg splint on 05/29/2022.\par \par He is s/p Removal of external fixator, I and D, left open proximal tibia fracture, Open reduction and internal fixation of left proximal shaft tibia fracture with medial and lateral plate and Application of trilaminar short leg splint to treat the foot drop on 06/04/2022.  [de-identified] : Patient is WDWN, alert, and in no acute distress. Breathing is unlabored. He is grossly oriented to person, place, and time.\par \par He is accompanied by his wife today. \par \par He presents to the office WBAT, with the assistance of crutches.\par \par Left Knee:\par Incision sites are well healed, without signs of postoperative infection noted.\par Postoperative edema and tenderness presents.\par ROM to the left knee: 0°-105°   [de-identified] : AP, lateral and oblique views of the LEFT knee were obtained today and revealed a well aligned bicondylar tibial plateau fracture stabilized by a seven hole one third tubular plate, 3.5 LC-DC plate and bone grafting. The hardware as well as fracture are well aligned. \par \par AP and lateral views of the LEFT tib/fib were obtained today and revealed a well aligned bicondylar tibial plateau fracture stabilized by a seven hole one third tubular plate, 3.5 LC-DC plate and bone grafting. The fracture and hardware are well aligned.  [de-identified] : At this time, I did tell him he may begin to partially weight bear over the next 6 weeks and then WBAT thereafter. He may resume physical therapy, again as partial WB. He was instructed on ROM exercises of the knee and low impact activities such as swimming and stationary bike. Finally, given there is mild evidence of redness along the incision site, I recommended he begin a course of oral antibiotics.\par The patient wishes to proceed with physical therapy of the left knee (PWB, knee ROM and strengthening). A script was given.\par RX: Duricef 500mg, BID (14 day supply).\par He will follow up in 6 weeks for repeat xrays.

## 2023-07-27 NOTE — ADDENDUM
[FreeTextEntry1] : I, Joyce Montano wrote this note acting as a scribe for Dr. Thompson Low on Jul 27, 2023.

## 2023-08-28 NOTE — OCCUPATIONAL THERAPY INITIAL EVALUATION ADULT - LIVES WITH, PROFILE
Medication refilled per protocol.   
Pt lives in a house with his wife and daughter, no steps to enter, 1 flight of stairs, PTA IND
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
## 2023-09-07 ENCOUNTER — APPOINTMENT (OUTPATIENT)
Dept: ORTHOPEDIC SURGERY | Facility: CLINIC | Age: 58
End: 2023-09-07
Payer: COMMERCIAL

## 2023-09-07 VITALS — BODY MASS INDEX: 28 KG/M2 | WEIGHT: 200 LBS | HEIGHT: 71 IN

## 2023-09-07 PROCEDURE — 99024 POSTOP FOLLOW-UP VISIT: CPT

## 2023-09-07 PROCEDURE — 73590 X-RAY EXAM OF LOWER LEG: CPT | Mod: LT

## 2023-09-07 PROCEDURE — 73564 X-RAY EXAM KNEE 4 OR MORE: CPT | Mod: LT

## 2023-09-07 NOTE — HISTORY OF PRESENT ILLNESS
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 The surgery was on 06/16/2023. He is having pain as well as swelling. He has started light weight bearing on the left lower extremity, though he has not completely weight beared. He has been going to PT. He   He is s/p Irrigation and debridement of left open tibial plateau fracture, Application of spanning external fixator for left tibial plateau across the left knee, Irrigation and debridement and repair of right knee laceration, and application of left long-leg splint on 05/29/2022.  He is s/p Removal of external fixator, I and D, left open proximal tibia fracture, Open reduction and internal fixation of left proximal shaft tibia fracture with medial and lateral plate and Application of trilaminar short leg splint to treat the foot drop on 06/04/2022.  [de-identified] : Patient is WDWN, alert, and in no acute distress. Breathing is unlabored. He is grossly oriented to person, place, and time.  He is accompanied by his wife today.   He presents to the office WBAT, with the assistance of crutches.  Left Knee: Incision sites are well healed, without signs of postoperative infection noted. Postoperative edema and tenderness presents. ROM to the left knee: 0-125. [de-identified] : AP, lateral and oblique views of the LEFT knee were obtained today and revealed a well aligned bicondylar tibial plateau fracture stabilized by a seven hole one third tubular plate, 3.5 LC-DC plate and bone grafting. The hardware as well as fracture are well aligned. \par  \par  AP and lateral views of the LEFT tib/fib were obtained today and revealed a well aligned bicondylar tibial plateau fracture stabilized by a seven hole one third tubular plate, 3.5 LC-DC plate and bone grafting. The fracture and hardware are well aligned.  [de-identified] : At this time, I did tell him he may continue to advance with partially weight bear over the next 6 weeks and then WBAT thereafter. He may continue with physical therapy to work on WB; new Rx provided today. He was instructed on ROM exercises of the knee and low impact activities such as swimming and stationary bike. The patient wishes to proceed with physical therapy of the left knee (PWB, knee ROM and strengthening). A script was given. Patient also plans on advancing to cane for ambulation. I discussed the option of a bone simulator for the future.  He will follow up in 6 weeks for repeat xrays.

## 2023-09-07 NOTE — ADDENDUM
[FreeTextEntry1] : I, Ani Zimmer, wrote this note acting as a scribe for Dr. Thompson Low on September 7, 2023.

## 2023-09-15 ENCOUNTER — TRANSCRIPTION ENCOUNTER (OUTPATIENT)
Age: 58
End: 2023-09-15

## 2023-10-19 ENCOUNTER — APPOINTMENT (OUTPATIENT)
Dept: ORTHOPEDIC SURGERY | Facility: CLINIC | Age: 58
End: 2023-10-19
Payer: COMMERCIAL

## 2023-10-19 VITALS — HEIGHT: 71 IN | BODY MASS INDEX: 28 KG/M2 | WEIGHT: 200 LBS

## 2023-10-19 DIAGNOSIS — M79.672 PAIN IN LEFT FOOT: ICD-10-CM

## 2023-10-19 PROCEDURE — 99213 OFFICE O/P EST LOW 20 MIN: CPT

## 2023-10-19 PROCEDURE — 73564 X-RAY EXAM KNEE 4 OR MORE: CPT | Mod: LT

## 2023-10-19 PROCEDURE — 73590 X-RAY EXAM OF LOWER LEG: CPT | Mod: LT

## 2024-01-25 ENCOUNTER — APPOINTMENT (OUTPATIENT)
Dept: ORTHOPEDIC SURGERY | Facility: CLINIC | Age: 59
End: 2024-01-25
Payer: COMMERCIAL

## 2024-01-25 VITALS — BODY MASS INDEX: 28 KG/M2 | WEIGHT: 200 LBS | HEIGHT: 71 IN

## 2024-01-25 DIAGNOSIS — S82.262K: ICD-10-CM

## 2024-01-25 PROCEDURE — 99213 OFFICE O/P EST LOW 20 MIN: CPT

## 2024-01-25 PROCEDURE — 73590 X-RAY EXAM OF LOWER LEG: CPT | Mod: LT

## 2024-01-25 PROCEDURE — 73564 X-RAY EXAM KNEE 4 OR MORE: CPT | Mod: LT

## 2024-02-10 NOTE — DISCHARGE NOTE PROVIDER - NSDCADMDATE_GEN_ALL_CORE_FT
Pulmonary Consult Note   Reason for consultation: COVID 19.  Sleep apnea.    History and Physical   Guillermo is a 64 year old former smoker with past medical history of    chronic kidney disease on hemodialysis, sleep apnea (compliant with BiPAP),   Left total MRSA,  gastric bypass surgery,  iron deficiency anemia,admitted on 2024  4:39 PM for evaluation of  fevers and chills for few days prior to admission.  While at dialysis he developed chills.  O2 saturations were documented to be low.  He was advised to go to the ER.       Chest x-ray with pulmonary vascular congestion.      At night he used the hospital BiPAP.  There was a request whether he could use his own BiPAP subsequently.      SARS-CoV-2 detected.     ID was consulted.  He was started on vancomycin and ceftriaxone.  Review of Systems     Review of Systems     No acute dyspnea, cough or sputum production.  No wheezing reported.  No chest pain.  No nausea, vomiting or diarrhea.  No urinary symptoms.  He reports cellulitis on the left calf.  Medications     Medications Prior to Admission   Medication Sig Dispense Refill    doxycycline hyclate (VIBRAMYCIN) 100 MG capsule Take 100 mg by mouth in the morning and 100 mg in the evening.      folic acid (FOLATE) 1 MG tablet Take 1 mg by mouth daily.      CALCITRIOL PO Take 2 mcg by mouth 3 days a week. On  at HD      Iron Sucrose (VENOFER IV) Inject 50 mg into the vein 1 day a week. On Friday at HD      [] sodium chloride 0.9 % Solution 250 mL with vancomycin 1 g Recon Soln 1,000 mg Inject 1,000 mg into the vein 1 time.      rOPINIRole (REQUIP) 0.5 MG tablet Take 0.5 mg by mouth in the morning and 0.5 mg in the evening. At 1700 and 2100      lanthanum (FOSRENOL) 500 MG chewable tablet Chew 1,000 mg by mouth in the morning and 1,000 mg at noon and 1,000 mg in the evening. Chew with meals.      calcium acetate gelcap (PHOSLO) 667 MG capsule Take by mouth as directed. Take 3 capsule =2001 mg with each  meal and take 2 capsule= 1334 mg with each snacks      cilostazol (PLETAL) 100 MG tablet Take 1 tablet by mouth in the morning and 1 tablet in the evening. (Patient taking differently: Take 100 mg by mouth in the morning and 100 mg in the evening. At 1700 and 2100) 28 tablet 1    Ozempic, 0.25 or 0.5 MG/DOSE, 2 MG/3ML Solution Pen-injector Inject 0.5 mg into the skin 1 day a week. Monday      cinacalcet (SENSIPAR) 90 MG tablet Take 1 tablet by mouth 1 day a week. Monday      Methoxy PEG-Epoetin Beta (MIRCERA IJ) Inject 225 mcg into the vein every 14 days.      traMADol (ULTRAM) 50 MG tablet Take 2 tablets by mouth 4 times daily.      escitalopram (LEXAPRO) 10 MG tablet Take 10 mg by mouth daily.      Cholecalciferol (Vitamin D) 125 MCG (5000 UT) Cap Take 1 capsule by mouth daily.         Allergies   ALLERGIES:  Patient has no known allergies.    Past Medical History     Past Medical History:   Diagnosis Date    Anemia due to chronic kidney disease 11/15/2021    Anemia in stage 3b chronic kidney disease (CMD)     Anxiety     Charcot arthropathy of midfoot 2022    left foot    Essential (primary) hypertension     no longer on medications    Folate deficiency     Gastroesophageal reflux disease     per patient hematologist put him on it.    Hemodialysis patient (CMD)     Iron deficiency anemia     since gastric bypass surgery    Lack of energy     Sleep apnea     uses BiPap        Past Surgical History     Past Surgical History:   Procedure Laterality Date    Av fistula placement Left     Colonoscopy diagnostic  2017    Esophagogastroduodenoscopy transoral flex w/bx single or mult  2017    Gastric bypass  2003    Hemorrhoid surgery      ,     Hernia repair      umbilical        Social History     Social History     Tobacco Use    Smoking status: Former     Current packs/day: 0.00     Types: Cigarettes     Quit date:      Years since quittin.1     Passive exposure: Never    Smokeless  tobacco: Never   Vaping Use    Vaping Use: never used   Substance Use Topics    Alcohol use: Yes     Comment: rarely    Drug use: Never       Family History     Family History   Problem Relation Age of Onset    Natural Causes Mother     Stroke Father        Physical Exam   Visit Vitals  /74 (BP Location: RUE - Right upper extremity)   Pulse 82   Temp 98.6 °F (37 °C) (Oral)   Resp (!) 197   Ht 5' 10\" (1.778 m)   Wt (!) 139.5 kg (307 lb 8.7 oz)   SpO2 97%   BMI 44.13 kg/m²     Patient was on room air during encounter.    Physical Exam     pleasant, obese, 64-year-old man sitting up in chair.  He appears chronically ill.  Breathing not labored while at rest.    Nasal tone to voice. Conjunctive a pink and sclera anicteric.  No JVD.  Breath sounds slightly diminished.  No wheezing.  Minimal rhonchi.  Heart rhythm regular.  Abdomen soft and nontender.  No clubbing or cyanosis.  Lower extremity with chronic edematous changes; left  leg was wrapped.    Relevant Results   LABS  Admission on 02/07/2024   Component Date Value Ref Range Status    Extra Tube 02/07/2024 Hold for Add Ons   Final    Extra Tube 02/07/2024 Hold for Add Ons   Final    Extra Tube 02/07/2024 Hold for Add Ons   Final    Extra Tube 02/07/2024 Hold for Add Ons   Final    Extra Tube 02/07/2024 Hold for Add Ons   Final    Ventricular Rate EKG/Min (BPM) 02/07/2024 122   Final    Atrial Rate (BPM) 02/07/2024 122   Final    N[place]-Interval (MSEC) 02/07/2024 162   Final    QRS-Interval (MSEC) 02/07/2024 82   Final    QT-Interval (MSEC) 02/07/2024 322   Final    QTc 02/07/2024 459   Final    P Axis (Degrees) 02/07/2024 76   Final    R Axis (Degrees) 02/07/2024 51   Final    T Axis (Degrees) 02/07/2024 49   Final    REPORT TEXT 02/07/2024    Final                    Value:Sinus tachycardia  RSR' or QR pattern in V1 suggests right ventricular conduction delay  Low voltage QRS  Abnormal ECG  When compared with ECG of  28-MAR-2023 10:47,  No significant change was  found  HR is faster  Confirmed by ELEONORA GOSS MD ([id number]) on 2/8/2024 11:04:17 AM      Rapid SARS-COV-2 by PCR 02/07/2024 Detected (A)  Not Detected / Detected / Presumptive Positive / Inhibitors present Final    Influenza A by PCR 02/07/2024 Not Detected  Not Detected Final    Influenza B by PCR 02/07/2024 Not Detected  Not Detected Final    RSV BY PCR 02/07/2024 Not Detected  Not Detected Final    Procedural Comment 02/07/2024    Final    SARS-CoV-2 N2 Ct Value 02/07/2024 26.6   Final    Sodium 02/07/2024 136  135 - 145 mmol/L Final    Potassium 02/07/2024 3.7  3.4 - 5.1 mmol/L Final    Chloride 02/07/2024 97  97 - 110 mmol/L Final    Carbon Dioxide 02/07/2024 32  21 - 32 mmol/L Final    Anion Gap 02/07/2024 11  7 - 19 mmol/L Final    Glucose 02/07/2024 83  70 - 99 mg/dL Final    BUN 02/07/2024 30 (H)  6 - 20 mg/dL Final    Creatinine 02/07/2024 4.07 (H)  0.67 - 1.17 mg/dL Final    Glomerular Filtration Rate 02/07/2024 15 (L)  >=60 Final    BUN/Cr 02/07/2024 7  7 - 25 Final    Calcium 02/07/2024 9.2  8.4 - 10.2 mg/dL Final    Bilirubin, Total 02/07/2024 0.6  0.2 - 1.0 mg/dL Final    GOT/AST 02/07/2024 9  <=37 Units/L Final    GPT/ALT 02/07/2024 9  <64 Units/L Final    Alkaline Phosphatase 02/07/2024 153 (H)  45 - 117 Units/L Final    Albumin 02/07/2024 3.2 (L)  3.6 - 5.1 g/dL Final    Protein, Total 02/07/2024 7.5  6.4 - 8.2 g/dL Final    Globulin 02/07/2024 4.3 (H)  2.0 - 4.0 g/dL Final    A/G Ratio 02/07/2024 0.7 (L)  1.0 - 2.4 Final    Culture, Blood or Bone Marrow 02/07/2024 No Growth 2 Days.   Preliminary    Culture, Blood or Bone Marrow 02/07/2024 No Growth 2 Days.   Preliminary    Troponin I, High Sensitivity 02/07/2024 12  <77 ng/L Final    Protime- PT 02/07/2024 10.3  9.7 - 11.8 sec Final    INR 02/07/2024 0.9    Final    PTT 02/07/2024 31  22 - 32 sec Final    NT-proBNP 02/07/2024 2,205 (H)  <=125 pg/mL Final    Lactate, Venous 02/07/2024 1.3  0.0 - 2.0 mmol/L Final    Magnesium 02/07/2024 2.2  1.7 -  2.4 mg/dL Final    RBC Sedimentation Rate 02/07/2024 75 (H)  0 - 20 mm/hr Final    C-Reactive Protein 02/07/2024 11.4 (H)  <=1.0 mg/dL Final    WBC 02/07/2024 6.3  4.2 - 11.0 K/mcL Final    RBC 02/07/2024 3.07 (L)  4.50 - 5.90 mil/mcL Final    HGB 02/07/2024 8.3 (L)  13.0 - 17.0 g/dL Final    HCT 02/07/2024 27.0 (L)  39.0 - 51.0 % Final    MCV 02/07/2024 87.9  78.0 - 100.0 fl Final    MCH 02/07/2024 27.0  26.0 - 34.0 pg Final    MCHC 02/07/2024 30.7 (L)  32.0 - 36.5 g/dL Final    RDW-CV 02/07/2024 17.1 (H)  11.0 - 15.0 % Final    RDW-SD 02/07/2024 54.1 (H)  39.0 - 50.0 fL Final    PLT 02/07/2024 192  140 - 450 K/mcL Final    NRBC 02/07/2024 0  <=0 /100 WBC Final    Neutrophil, Percent 02/07/2024 82  % Final    Lymphocytes, Percent 02/07/2024 8  % Final    Mono, Percent 02/07/2024 5  % Final    Eosinophils, Percent 02/07/2024 3  % Final    Basophils, Percent 02/07/2024 1  % Final    Immature Granulocytes 02/07/2024 1  % Final    Absolute Neutrophils 02/07/2024 5.2  1.8 - 7.7 K/mcL Final    Absolute Lymphocytes 02/07/2024 0.5 (L)  1.0 - 4.0 K/mcL Final    Absolute Monocytes 02/07/2024 0.3  0.3 - 0.9 K/mcL Final    Absolute Eosinophils  02/07/2024 0.2  0.0 - 0.5 K/mcL Final    Absolute Basophils 02/07/2024 0.0  0.0 - 0.3 K/mcL Final    Absolute Immature Granulocytes 02/07/2024 0.1  0.0 - 0.2 K/mcL Final    GLUCOSE, BEDSIDE - POINT OF CARE 02/08/2024 117 (H)  70 - 99 mg/dL Final    Culture, Blood or Bone Marrow 02/08/2024 No Growth 2 Days.   Preliminary    Culture, Blood or Bone Marrow 02/08/2024 No Growth 2 Days.   Preliminary    GLUCOSE, BEDSIDE - POINT OF CARE 02/08/2024 82  70 - 99 mg/dL Final    Vancomycin, Random 02/08/2024 7.9  <=40.0 mcg/mL Final    GLUCOSE, BEDSIDE - POINT OF CARE 02/08/2024 91  70 - 99 mg/dL Final    GLUCOSE, BEDSIDE - POINT OF CARE 02/08/2024 101 (H)  70 - 99 mg/dL Final    Sodium 02/09/2024 136  135 - 145 mmol/L Final    Potassium 02/09/2024 3.7  3.4 - 5.1 mmol/L Final    Chloride 02/09/2024  100  97 - 110 mmol/L Final    Carbon Dioxide 02/09/2024 25  21 - 32 mmol/L Final    Anion Gap 02/09/2024 15  7 - 19 mmol/L Final    Glucose 02/09/2024 78  70 - 99 mg/dL Final    BUN 02/09/2024 64 (H)  6 - 20 mg/dL Final    Creatinine 02/09/2024 7.38 (H)  0.67 - 1.17 mg/dL Final    Glomerular Filtration Rate 02/09/2024 8 (L)  >=60 Final    BUN/Cr 02/09/2024 9  7 - 25 Final    Calcium 02/09/2024 8.5  8.4 - 10.2 mg/dL Final    WBC 02/09/2024 4.1 (L)  4.2 - 11.0 K/mcL Final    RBC 02/09/2024 2.38 (L)  4.50 - 5.90 mil/mcL Final    HGB 02/09/2024 6.4 (LL)  13.0 - 17.0 g/dL Final    HCT 02/09/2024 21.3 (L)  39.0 - 51.0 % Final    MCV 02/09/2024 89.5  78.0 - 100.0 fl Final    MCH 02/09/2024 26.9  26.0 - 34.0 pg Final    MCHC 02/09/2024 30.0 (L)  32.0 - 36.5 g/dL Final    RDW-CV 02/09/2024 16.7 (H)  11.0 - 15.0 % Final    RDW-SD 02/09/2024 54.2 (H)  39.0 - 50.0 fL Final    PLT 02/09/2024 117 (L)  140 - 450 K/mcL Final    NRBC 02/09/2024 0  <=0 /100 WBC Final    Neutrophil, Percent 02/09/2024 61  % Final    Lymphocytes, Percent 02/09/2024 17  % Final    Mono, Percent 02/09/2024 13  % Final    Eosinophils, Percent 02/09/2024 6  % Final    Basophils, Percent 02/09/2024 1  % Final    Immature Granulocytes 02/09/2024 2  % Final    Absolute Neutrophils 02/09/2024 2.5  1.8 - 7.7 K/mcL Final    Absolute Lymphocytes 02/09/2024 0.7 (L)  1.0 - 4.0 K/mcL Final    Absolute Monocytes 02/09/2024 0.5  0.3 - 0.9 K/mcL Final    Absolute Eosinophils  02/09/2024 0.2  0.0 - 0.5 K/mcL Final    Absolute Basophils 02/09/2024 0.0  0.0 - 0.3 K/mcL Final    Absolute Immature Granulocytes 02/09/2024 0.1  0.0 - 0.2 K/mcL Final    ABO/RH(D) 02/09/2024 O Rh Positive   Final    ANTIBODY SCREEN 02/09/2024 Negative   Final    TYPE AND SCREEN EXPIRATION DATE 02/09/2024 02/12/2024 23:59   Final    UNIT BLOOD TYPE 02/09/2024 O Pos   Final    ISBT BLOOD TYPE 02/09/2024 5100   Final    BLOOD EXPIRATION DATE 02/09/2024 [id number]   Final    UNIT NUMBER  02/09/2024 [id number]   Final    DISPENSE STATUS 02/09/2024 Transfused   Final    PRODUCT ID 02/09/2024 Red Blood Cells   Final    PRODUCT CODE 02/09/2024 [id number]   Final    PRODUCT DESCRIPTION 02/09/2024 RBC AS-1 LR   Final    CROSSMATCH RESULT 02/09/2024 Compatible   Final    ISSUE DATE/TIME 02/09/2024 [id number]   Final    Extra Tube 02/09/2024 Hold for Add Ons   Final    GLUCOSE, BEDSIDE - POINT OF CARE 02/09/2024 71  70 - 99 mg/dL Final    GLUCOSE, BEDSIDE - POINT OF CARE 02/09/2024 91  70 - 99 mg/dL Final    HCT 02/09/2024 26.5 (L)  39.0 - 51.0 % Final    HGB 02/09/2024 8.2 (L)  13.0 - 17.0 g/dL Final    GLUCOSE, BEDSIDE - POINT OF CARE 02/09/2024 126 (H)  70 - 99 mg/dL Final    Sodium 02/10/2024 138  135 - 145 mmol/L Final    Potassium 02/10/2024 3.8  3.4 - 5.1 mmol/L Final    Chloride 02/10/2024 101  97 - 110 mmol/L Final    Carbon Dioxide 02/10/2024 28  21 - 32 mmol/L Final    Anion Gap 02/10/2024 13  7 - 19 mmol/L Final    Glucose 02/10/2024 81  70 - 99 mg/dL Final    BUN 02/10/2024 45 (H)  6 - 20 mg/dL Final    Creatinine 02/10/2024 5.13 (H)  0.67 - 1.17 mg/dL Final    Glomerular Filtration Rate 02/10/2024 12 (L)  >=60 Final    BUN/Cr 02/10/2024 9  7 - 25 Final    Calcium 02/10/2024 8.3 (L)  8.4 - 10.2 mg/dL Final    WBC 02/10/2024 4.6  4.2 - 11.0 K/mcL Final    RBC 02/10/2024 2.79 (L)  4.50 - 5.90 mil/mcL Final    HGB 02/10/2024 7.5 (L)  13.0 - 17.0 g/dL Final    HCT 02/10/2024 24.6 (L)  39.0 - 51.0 % Final    MCV 02/10/2024 88.2  78.0 - 100.0 fl Final    MCH 02/10/2024 26.9  26.0 - 34.0 pg Final    MCHC 02/10/2024 30.5 (L)  32.0 - 36.5 g/dL Final    RDW-CV 02/10/2024 16.9 (H)  11.0 - 15.0 % Final    RDW-SD 02/10/2024 54.3 (H)  39.0 - 50.0 fL Final    PLT 02/10/2024 139 (L)  140 - 450 K/mcL Final    NRBC 02/10/2024 0  <=0 /100 WBC Final    Neutrophil, Percent 02/10/2024 58  % Final    Lymphocytes, Percent 02/10/2024 20  % Final    Mono, Percent 02/10/2024 12  % Final    Eosinophils, Percent  02/10/2024 8  % Final    Basophils, Percent 02/10/2024 0  % Final    Immature Granulocytes 02/10/2024 2  % Final    Absolute Neutrophils 02/10/2024 2.7  1.8 - 7.7 K/mcL Final    Absolute Lymphocytes 02/10/2024 0.9 (L)  1.0 - 4.0 K/mcL Final    Absolute Monocytes 02/10/2024 0.5  0.3 - 0.9 K/mcL Final    Absolute Eosinophils  02/10/2024 0.3  0.0 - 0.5 K/mcL Final    Absolute Basophils 02/10/2024 0.0  0.0 - 0.3 K/mcL Final    Absolute Immature Granulocytes 02/10/2024 0.1  0.0 - 0.2 K/mcL Final    GLUCOSE, BEDSIDE - POINT OF CARE 02/10/2024 69 (L)  70 - 99 mg/dL Final    GLUCOSE, BEDSIDE - POINT OF CARE 02/10/2024 99  70 - 99 mg/dL Final          IMAGING  XR CHEST AP OR PA   Final Result   1. No acute findings.      Electronically Signed by: MYRANDA LANDA M.D.    Signed on: 2/9/2024 7:15 AM    Workstation ID: [id number]      XR CHEST PA OR AP 1 VIEW   Final Result   1.   Cardiomegaly and pulmonary vascular congestion.   2.   Trace effusions and atelectasis.         Electronically Signed by: NIR ARANGO M.D.    Signed on: 2/7/2024 7:10 PM    Workstation ID: [id number]            Diagnosis   Obstructive sleep apnea G47.33  COPD J44.9  --  Former smoker.  COVID-19 virus infection   U07.1    Cellulitis, unspecified cellulitis site   Dialysis patient  Left lower extremity with chronic wounds.  Non-insulin-dependent diabetes mellitus  Plan   Patient may use his own BiPAP during hospitalization.  Antibiotics per ID  Vitamin C, vitamin D and zinc    DVT Prophylaxis:     Code Status       Code Status: Full Resuscitation    Inpatient Medications     Current Facility-Administered Medications   Medication    sodium chloride 0.9% infusion    insulin lispro (ADMELOG,HumaLOG) - Correction Dose    traMADol (ULTRAM) tablet 100 mg    folic acid (FOLATE) tablet 1 mg    acetaminophen (TYLENOL) tablet 1,000 mg    VANCOMYCIN - PHARMACIST MONITORED Misc    midodrine (PROAMATINE) tablet 5 mg    rOPINIRole (REQUIP) tablet 0.5 mg     calcium acetate gelcap (PHOSLO) capsule 2,001 mg    [START ON 2/12/2024] cinacalcet (SENSIPAR) tablet 90 mg    calcitRIOL (ROCALTROL) capsule 0.5 mcg    cholecalciferol (VITAMIN D) tablet 5,000 Units    [Held by provider] cilostazol (PLETAL) tablet 100 mg    escitalopram (LEXAPRO) tablet 10 mg    HYDROcodone-acetaminophen (NORCO) 5-325 MG per tablet 1 tablet    traMADol (ULTRAM) tablet 50 mg    cefTRIAXone (ROCEPHIN) syringe 2,000 mg    dextrose 50 % injection 25 g    dextrose 50 % injection 12.5 g    glucagon (GLUCAGEN) injection 1 mg    dextrose (GLUTOSE) 40 % gel 15 g    dextrose (GLUTOSE) 40 % gel 30 g    insulin lispro (ADMELOG,HumaLOG) - Correction Dose               29-May-2022 12:04

## 2024-02-20 ENCOUNTER — OUTPATIENT (OUTPATIENT)
Dept: OUTPATIENT SERVICES | Facility: HOSPITAL | Age: 59
LOS: 1 days | End: 2024-02-20
Payer: COMMERCIAL

## 2024-02-20 VITALS
HEIGHT: 71 IN | OXYGEN SATURATION: 98 % | RESPIRATION RATE: 15 BRPM | TEMPERATURE: 98 F | WEIGHT: 214.07 LBS | DIASTOLIC BLOOD PRESSURE: 88 MMHG | SYSTOLIC BLOOD PRESSURE: 152 MMHG

## 2024-02-20 DIAGNOSIS — G56.01 CARPAL TUNNEL SYNDROME, RIGHT UPPER LIMB: ICD-10-CM

## 2024-02-20 DIAGNOSIS — Z01.818 ENCOUNTER FOR OTHER PREPROCEDURAL EXAMINATION: ICD-10-CM

## 2024-02-20 DIAGNOSIS — Z98.89 OTHER SPECIFIED POSTPROCEDURAL STATES: Chronic | ICD-10-CM

## 2024-02-20 DIAGNOSIS — H57.8 OTHER SPECIFIED DISORDERS OF EYE AND ADNEXA: Chronic | ICD-10-CM

## 2024-02-20 DIAGNOSIS — Z98.890 OTHER SPECIFIED POSTPROCEDURAL STATES: Chronic | ICD-10-CM

## 2024-02-20 DIAGNOSIS — N13.5 CROSSING VESSEL AND STRICTURE OF URETER WITHOUT HYDRONEPHROSIS: Chronic | ICD-10-CM

## 2024-02-20 DIAGNOSIS — Z90.79 ACQUIRED ABSENCE OF OTHER GENITAL ORGAN(S): Chronic | ICD-10-CM

## 2024-02-20 LAB
ALBUMIN SERPL ELPH-MCNC: 4.1 G/DL — SIGNIFICANT CHANGE UP (ref 3.3–5)
ALP SERPL-CCNC: 57 U/L — SIGNIFICANT CHANGE UP (ref 30–120)
ALT FLD-CCNC: 33 U/L — SIGNIFICANT CHANGE UP (ref 10–60)
ANION GAP SERPL CALC-SCNC: 9 MMOL/L — SIGNIFICANT CHANGE UP (ref 5–17)
AST SERPL-CCNC: 19 U/L — SIGNIFICANT CHANGE UP (ref 10–40)
BILIRUB SERPL-MCNC: 0.6 MG/DL — SIGNIFICANT CHANGE UP (ref 0.2–1.2)
BUN SERPL-MCNC: 22 MG/DL — SIGNIFICANT CHANGE UP (ref 7–23)
CALCIUM SERPL-MCNC: 9.7 MG/DL — SIGNIFICANT CHANGE UP (ref 8.4–10.5)
CHLORIDE SERPL-SCNC: 103 MMOL/L — SIGNIFICANT CHANGE UP (ref 96–108)
CO2 SERPL-SCNC: 25 MMOL/L — SIGNIFICANT CHANGE UP (ref 22–31)
CREAT SERPL-MCNC: 1.05 MG/DL — SIGNIFICANT CHANGE UP (ref 0.5–1.3)
EGFR: 82 ML/MIN/1.73M2 — SIGNIFICANT CHANGE UP
GLUCOSE SERPL-MCNC: 100 MG/DL — HIGH (ref 70–99)
HCT VFR BLD CALC: 44.9 % — SIGNIFICANT CHANGE UP (ref 39–50)
HGB BLD-MCNC: 15.8 G/DL — SIGNIFICANT CHANGE UP (ref 13–17)
MCHC RBC-ENTMCNC: 31.3 PG — SIGNIFICANT CHANGE UP (ref 27–34)
MCHC RBC-ENTMCNC: 35.2 GM/DL — SIGNIFICANT CHANGE UP (ref 32–36)
MCV RBC AUTO: 88.9 FL — SIGNIFICANT CHANGE UP (ref 80–100)
NRBC # BLD: 0 /100 WBCS — SIGNIFICANT CHANGE UP (ref 0–0)
PLATELET # BLD AUTO: 294 K/UL — SIGNIFICANT CHANGE UP (ref 150–400)
POTASSIUM SERPL-MCNC: 4.6 MMOL/L — SIGNIFICANT CHANGE UP (ref 3.5–5.3)
POTASSIUM SERPL-SCNC: 4.6 MMOL/L — SIGNIFICANT CHANGE UP (ref 3.5–5.3)
PROT SERPL-MCNC: 7.7 G/DL — SIGNIFICANT CHANGE UP (ref 6–8.3)
RBC # BLD: 5.05 M/UL — SIGNIFICANT CHANGE UP (ref 4.2–5.8)
RBC # FLD: 12.7 % — SIGNIFICANT CHANGE UP (ref 10.3–14.5)
SODIUM SERPL-SCNC: 137 MMOL/L — SIGNIFICANT CHANGE UP (ref 135–145)
WBC # BLD: 4.65 K/UL — SIGNIFICANT CHANGE UP (ref 3.8–10.5)
WBC # FLD AUTO: 4.65 K/UL — SIGNIFICANT CHANGE UP (ref 3.8–10.5)

## 2024-02-20 PROCEDURE — 93010 ELECTROCARDIOGRAM REPORT: CPT

## 2024-02-20 PROCEDURE — 85027 COMPLETE CBC AUTOMATED: CPT

## 2024-02-20 PROCEDURE — 36415 COLL VENOUS BLD VENIPUNCTURE: CPT

## 2024-02-20 PROCEDURE — G0463: CPT

## 2024-02-20 PROCEDURE — 93005 ELECTROCARDIOGRAM TRACING: CPT

## 2024-02-20 PROCEDURE — 80053 COMPREHEN METABOLIC PANEL: CPT

## 2024-02-20 RX ORDER — ZINC ACETATE DIHYDRATE 100 %
1 CRYSTALS MISCELLANEOUS
Refills: 0 | DISCHARGE

## 2024-02-20 RX ORDER — MULTIVIT-MIN/FERROUS GLUCONATE 9 MG/15 ML
1 LIQUID (ML) ORAL
Refills: 0 | DISCHARGE

## 2024-02-20 RX ORDER — CALCIUM CARBONATE 500(1250)
0 TABLET ORAL
Refills: 0 | DISCHARGE

## 2024-02-20 RX ORDER — PREGABALIN 225 MG/1
1 CAPSULE ORAL
Refills: 0 | DISCHARGE

## 2024-02-20 RX ORDER — ASCORBIC ACID 60 MG
1 TABLET,CHEWABLE ORAL
Refills: 0 | DISCHARGE

## 2024-02-20 NOTE — H&P PST ADULT - PROBLEM SELECTOR PLAN 1
Right carpal tunnel release is planned for 3/1/2024  Diagnostic testing performed  medical clearance requested  Pre op instructions were reviewed  Best wishes offered

## 2024-02-20 NOTE — H&P PST ADULT - HISTORY OF PRESENT ILLNESS
57 yo male reports pain in right wrist with numbness in fingers 1 2 3 4.  He is scheduled for right carpal tunnel release on 3/1/2024 @ [facility].

## 2024-02-20 NOTE — H&P PST ADULT - NSICDXPASTSURGICALHX_GEN_ALL_CORE_FT
PAST SURGICAL HISTORY:  Cyst of eye Excision    History of open reduction and internal fixation (ORIF) procedure     S/P laparoscopic procedure s/p pyeloplasty- left, suprapubic catheter insertion & left stent insertion march 2014    S/P TURP (status post transurethral resection of prostate)     UPJ (ureteropelvic junction) obstruction s/p nephrostomy, feb 2014

## 2024-02-20 NOTE — H&P PST ADULT - MUSCULOSKELETAL
no joint swelling/no joint erythema/no joint warmth/no calf tenderness/decreased ROM due to pain/no chest wall tenderness/extremities exam details…

## 2024-02-20 NOTE — H&P PST ADULT - NSICDXPASTMEDICALHX_GEN_ALL_CORE_FT
PAST MEDICAL HISTORY:  Ambulates with cane     Injury of lower leg, left     Right carpal tunnel syndrome

## 2024-02-20 NOTE — H&P PST ADULT - NSICDXFAMILYHX_GEN_ALL_CORE_FT
FAMILY HISTORY:  Father  Still living? No  Family history of CHF (congestive heart failure), Age at diagnosis: Age Unknown  Family history of diabetes mellitus, Age at diagnosis: Age Unknown    Mother  Still living? No  Family history of CHF (congestive heart failure), Age at diagnosis: Age Unknown

## 2024-02-29 ENCOUNTER — TRANSCRIPTION ENCOUNTER (OUTPATIENT)
Age: 59
End: 2024-02-29

## 2024-02-29 PROBLEM — G56.01 CARPAL TUNNEL SYNDROME, RIGHT UPPER LIMB: Chronic | Status: ACTIVE | Noted: 2024-02-20

## 2024-02-29 PROBLEM — S89.92XA UNSPECIFIED INJURY OF LEFT LOWER LEG, INITIAL ENCOUNTER: Chronic | Status: ACTIVE | Noted: 2024-02-20

## 2024-02-29 PROBLEM — Z99.89 DEPENDENCE ON OTHER ENABLING MACHINES AND DEVICES: Chronic | Status: ACTIVE | Noted: 2024-02-20

## 2024-03-01 ENCOUNTER — APPOINTMENT (OUTPATIENT)
Dept: ORTHOPEDIC SURGERY | Facility: HOSPITAL | Age: 59
End: 2024-03-01

## 2024-03-01 ENCOUNTER — TRANSCRIPTION ENCOUNTER (OUTPATIENT)
Age: 59
End: 2024-03-01

## 2024-03-01 ENCOUNTER — OUTPATIENT (OUTPATIENT)
Dept: OUTPATIENT SERVICES | Facility: HOSPITAL | Age: 59
LOS: 1 days | End: 2024-03-01
Payer: COMMERCIAL

## 2024-03-01 VITALS — RESPIRATION RATE: 20 BRPM | SYSTOLIC BLOOD PRESSURE: 150 MMHG | DIASTOLIC BLOOD PRESSURE: 87 MMHG

## 2024-03-01 VITALS
TEMPERATURE: 98 F | HEART RATE: 75 BPM | OXYGEN SATURATION: 97 % | RESPIRATION RATE: 15 BRPM | WEIGHT: 217.6 LBS | SYSTOLIC BLOOD PRESSURE: 139 MMHG | DIASTOLIC BLOOD PRESSURE: 93 MMHG | HEIGHT: 71 IN

## 2024-03-01 DIAGNOSIS — H57.8 OTHER SPECIFIED DISORDERS OF EYE AND ADNEXA: Chronic | ICD-10-CM

## 2024-03-01 DIAGNOSIS — Z98.89 OTHER SPECIFIED POSTPROCEDURAL STATES: Chronic | ICD-10-CM

## 2024-03-01 DIAGNOSIS — N13.5 CROSSING VESSEL AND STRICTURE OF URETER WITHOUT HYDRONEPHROSIS: Chronic | ICD-10-CM

## 2024-03-01 DIAGNOSIS — Z98.890 OTHER SPECIFIED POSTPROCEDURAL STATES: Chronic | ICD-10-CM

## 2024-03-01 DIAGNOSIS — Z01.818 ENCOUNTER FOR OTHER PREPROCEDURAL EXAMINATION: ICD-10-CM

## 2024-03-01 DIAGNOSIS — Z90.79 ACQUIRED ABSENCE OF OTHER GENITAL ORGAN(S): Chronic | ICD-10-CM

## 2024-03-01 DIAGNOSIS — G56.01 CARPAL TUNNEL SYNDROME, RIGHT UPPER LIMB: ICD-10-CM

## 2024-03-01 PROCEDURE — ZZZZZ: CPT

## 2024-03-01 PROCEDURE — 64721 CARPAL TUNNEL SURGERY: CPT | Mod: RT

## 2024-03-01 RX ORDER — APREPITANT 80 MG/1
40 CAPSULE ORAL ONCE
Refills: 0 | Status: COMPLETED | OUTPATIENT
Start: 2024-03-01 | End: 2024-03-01

## 2024-03-01 RX ORDER — CHLORHEXIDINE GLUCONATE 213 G/1000ML
1 SOLUTION TOPICAL ONCE
Refills: 0 | Status: COMPLETED | OUTPATIENT
Start: 2024-03-01 | End: 2024-03-01

## 2024-03-01 RX ORDER — ACETAMINOPHEN 500 MG
2 TABLET ORAL
Qty: 0 | Refills: 0 | DISCHARGE

## 2024-03-01 RX ORDER — CEFAZOLIN SODIUM 1 G
2000 VIAL (EA) INJECTION ONCE
Refills: 0 | Status: COMPLETED | OUTPATIENT
Start: 2024-03-01 | End: 2024-03-01

## 2024-03-01 RX ORDER — ONDANSETRON 8 MG/1
4 TABLET, FILM COATED ORAL ONCE
Refills: 0 | Status: DISCONTINUED | OUTPATIENT
Start: 2024-03-01 | End: 2024-03-01

## 2024-03-01 RX ORDER — HYDROMORPHONE HYDROCHLORIDE 2 MG/ML
0.2 INJECTION INTRAMUSCULAR; INTRAVENOUS; SUBCUTANEOUS
Refills: 0 | Status: DISCONTINUED | OUTPATIENT
Start: 2024-03-01 | End: 2024-03-01

## 2024-03-01 RX ORDER — OXYCODONE AND ACETAMINOPHEN 5; 325 MG/1; MG/1
1 TABLET ORAL ONCE
Refills: 0 | Status: DISCONTINUED | OUTPATIENT
Start: 2024-03-01 | End: 2024-03-01

## 2024-03-01 RX ORDER — SODIUM CHLORIDE 9 MG/ML
1000 INJECTION, SOLUTION INTRAVENOUS
Refills: 0 | Status: DISCONTINUED | OUTPATIENT
Start: 2024-03-01 | End: 2024-03-01

## 2024-03-01 RX ORDER — IBUPROFEN 200 MG
1 TABLET ORAL
Qty: 30 | Refills: 0
Start: 2024-03-01 | End: 2024-03-10

## 2024-03-01 RX ORDER — OXYCODONE HYDROCHLORIDE 5 MG/1
1 TABLET ORAL
Qty: 5 | Refills: 0
Start: 2024-03-01

## 2024-03-01 RX ORDER — HYDROMORPHONE HYDROCHLORIDE 2 MG/ML
0.5 INJECTION INTRAMUSCULAR; INTRAVENOUS; SUBCUTANEOUS
Refills: 0 | Status: DISCONTINUED | OUTPATIENT
Start: 2024-03-01 | End: 2024-03-01

## 2024-03-01 RX ADMIN — APREPITANT 40 MILLIGRAM(S): 80 CAPSULE ORAL at 06:25

## 2024-03-01 RX ADMIN — CHLORHEXIDINE GLUCONATE 1 APPLICATION(S): 213 SOLUTION TOPICAL at 06:26

## 2024-03-01 NOTE — ASU DISCHARGE PLAN (ADULT/PEDIATRIC) - ASU DC SPECIAL INSTRUCTIONSFT
Elevate the right hand to reduce swelling and pain.  Start exercising the right fingers on the day of surgery.    For Constipation :   • Increase your water intake. Drink at least 8 glasses of water daily.  • Try adding fiber to your diet by eating fruits, vegetables and foods that are rich in grains.  • If you do experience constipation, you may take an over-the-counter stool softener/laxative such as Jackelyn Colace, [place] or  Milk of Magnesia.

## 2024-03-01 NOTE — ASU DISCHARGE PLAN (ADULT/PEDIATRIC) - NS MD DC FALL RISK RISK
For information on Fall & Injury Prevention, visit: [email or web address] OR  [email or web address] OR  https://www.cdc.gov/steadi/patient.html

## 2024-03-01 NOTE — ASU DISCHARGE PLAN (ADULT/PEDIATRIC) - CARE PROVIDER_API CALL
Thompson Low  Orthopaedic Surgery  [address], [address]  [place] [address]  Phone: [phone]  Fax: [phone]  Established Patient  Follow Up Time: 2 weeks

## 2024-03-01 NOTE — ASU DISCHARGE PLAN (ADULT/PEDIATRIC) - COMMENTS
Please call the office for an appointment in about 12 days to remove the dressing and remove sutures

## 2024-03-01 NOTE — ASU DISCHARGE PLAN (ADULT/PEDIATRIC) - BATHING
keep the post operative dressing dry till you follow up with Dr Low/Shower only/Do not submerge in water

## 2024-03-01 NOTE — ASU PATIENT PROFILE, ADULT - FALL HARM RISK - UNIVERSAL INTERVENTIONS
Bed in lowest position, wheels locked, appropriate side rails in place/Call bell, personal items and telephone in reach/Instruct patient to call for assistance before getting out of bed or chair/Non-slip footwear when patient is out of bed/[place]vin to call system/Physically safe environment - no spills, clutter or unnecessary equipment/Purposeful Proactive Rounding/Room/bathroom lighting operational, light cord in reach

## 2024-03-11 ENCOUNTER — APPOINTMENT (OUTPATIENT)
Dept: ORTHOPEDIC SURGERY | Facility: CLINIC | Age: 59
End: 2024-03-11
Payer: COMMERCIAL

## 2024-03-11 VITALS — HEIGHT: 71 IN | BODY MASS INDEX: 28 KG/M2 | WEIGHT: 200 LBS

## 2024-03-11 DIAGNOSIS — G56.02 CARPAL TUNNEL SYNDROME, LEFT UPPER LIMB: ICD-10-CM

## 2024-03-11 PROCEDURE — 99024 POSTOP FOLLOW-UP VISIT: CPT

## 2024-03-11 NOTE — END OF VISIT
[FreeTextEntry3] :  All medical record entries made by the Scribe were at my,  Dr. Thompson Low MD., direction and personally dictated by me on 03/11/2024. I have personally reviewed the chart and agree that the record accurately reflects my personal performance of the history, physical exam, assessment and plan.

## 2024-03-11 NOTE — HISTORY OF PRESENT ILLNESS
[de-identified] : 1st POA s/p  Right carpal tunnel release. [de-identified] : The patient is a 58 year male who returns for the 1st postoperative visit after undergoing Right carpal tunnel release. at [facility]. The surgery was on 03/01/2024. The patient is recovering at home. He reports mild postoperative pain. [de-identified] :  Patient is WDWN, alert, and in no acute distress. Breathing is unlabored. He is grossly oriented to person, place, and time.   Right Hand:   Incision is healing well. Sutures were removed. There are no signs of infection. Sutures were removed. Normal amount of postoperative edema and tenderness present. [de-identified] :  The underlying pathophysiology was reviewed with the patient. Discussed at length the nature of the patient's condition.   Patient was advised to take OTC medications and topical analgesic for pain management.    The sutures were removed. The patient was instructed to keep the incision site dry for 14 days from the date of surgery. They may then begin to massage the scar with vitamin E oil. Gentle range of motion, stretching, and strengthening exercises were encouraged. Patient should actively work on gradually increasing use of the hand, as tolerated.   All questions answered, understanding verbalized. Patient in agreement with plan of care.   Patient was advised to follow up in 3 months or as needed.  [de-identified] :  No imaging done today 03/11/2024.

## 2024-03-11 NOTE — ADDENDUM
[FreeTextEntry1] :  I, Basilio Jaramillo wrote this note acting as a scribe for Dr. Thompson Low on Mar 11, 2024.

## 2024-04-12 NOTE — BH CONSULTATION LIAISON ASSESSMENT NOTE - DOMICILE TYPE
Patient presents with complaint of lower abd pain and pain with urination for 3-4 days.  Also complaining of cough and fever for 4 days.  Last dose of Tylenol about two hours ago, allergy to NSAIDS.     Triage Assessment (Adult)       Row Name 04/11/24 [id number]          Triage Assessment    Airway WDL WDL        Respiratory WDL    Respiratory WDL cough     Cough Frequency infrequent        Skin Circulation/Temperature WDL    Skin Circulation/Temperature WDL WDL        Cardiac WDL    Cardiac WDL WDL        Peripheral/Neurovascular WDL    Peripheral Neurovascular WDL WDL        Cognitive/Neuro/Behavioral WDL    Cognitive/Neuro/Behavioral WDL WDL                     
Private Residence

## 2024-04-29 ENCOUNTER — APPOINTMENT (OUTPATIENT)
Dept: ORTHOPEDIC SURGERY | Facility: CLINIC | Age: 59
End: 2024-04-29
Payer: COMMERCIAL

## 2024-04-29 DIAGNOSIS — G56.01 CARPAL TUNNEL SYNDROME, RIGHT UPPER LIMB: ICD-10-CM

## 2024-04-29 PROCEDURE — 99024 POSTOP FOLLOW-UP VISIT: CPT

## 2024-06-17 ENCOUNTER — APPOINTMENT (OUTPATIENT)
Dept: ORTHOPEDIC SURGERY | Facility: CLINIC | Age: 59
End: 2024-06-17
Payer: COMMERCIAL

## 2024-06-17 VITALS — WEIGHT: 200 LBS | BODY MASS INDEX: 28 KG/M2 | HEIGHT: 71 IN

## 2024-06-17 DIAGNOSIS — S82.142E: ICD-10-CM

## 2024-06-17 DIAGNOSIS — G56.03 CARPAL TUNNEL SYNDROM,BILATERAL UPPER LIMBS: ICD-10-CM

## 2024-06-17 PROCEDURE — 73590 X-RAY EXAM OF LOWER LEG: CPT | Mod: LT

## 2024-06-17 PROCEDURE — 99213 OFFICE O/P EST LOW 20 MIN: CPT

## 2024-06-17 PROCEDURE — 73564 X-RAY EXAM KNEE 4 OR MORE: CPT | Mod: LT

## 2024-08-21 ENCOUNTER — NON-APPOINTMENT (OUTPATIENT)
Age: 59
End: 2024-08-21

## 2024-08-28 ENCOUNTER — NON-APPOINTMENT (OUTPATIENT)
Age: 59
End: 2024-08-28

## 2024-09-05 NOTE — ASU PREOP CHECKLIST - WARM FLUIDS/WARM BLANKETS
Not neutropenic, if febrile pan culture, f/u bcx. Not neutropenic, if febrile pan culture, f/u bcx. no

## 2024-10-31 ENCOUNTER — APPOINTMENT (OUTPATIENT)
Dept: ORTHOPEDIC SURGERY | Facility: CLINIC | Age: 59
End: 2024-10-31

## 2024-12-19 ENCOUNTER — APPOINTMENT (OUTPATIENT)
Dept: ORTHOPEDIC SURGERY | Facility: CLINIC | Age: 59
End: 2024-12-19
Payer: COMMERCIAL

## 2024-12-19 VITALS — HEIGHT: 71 IN | WEIGHT: 200 LBS | BODY MASS INDEX: 28 KG/M2

## 2024-12-19 DIAGNOSIS — G56.03 CARPAL TUNNEL SYNDROM,BILATERAL UPPER LIMBS: ICD-10-CM

## 2024-12-19 DIAGNOSIS — S82.142E: ICD-10-CM

## 2024-12-19 PROCEDURE — 73590 X-RAY EXAM OF LOWER LEG: CPT | Mod: LT

## 2024-12-19 PROCEDURE — 99214 OFFICE O/P EST MOD 30 MIN: CPT

## (undated) DEVICE — DRAIN JACKSON PRATT 3 SPRING RESERVOIR W 15FR PVC DRAIN

## (undated) DEVICE — DRILL BIT SYNTHES ORTHO QC 3.5X110MM

## (undated) DEVICE — DRAPE C ARM C-ARMOUR

## (undated) DEVICE — GLV 7.5 PROTEXIS (WHITE)

## (undated) DEVICE — TOURNIQUET CUFF 34" DUAL PORT W PLC

## (undated) DEVICE — GLV 8 PROTEXIS (BLUE)

## (undated) DEVICE — POSITIONER STRAP ARMBOARD VELCRO TS-30

## (undated) DEVICE — WARMING BLANKET LOWER ADULT

## (undated) DEVICE — DRAIN JACKSON PRATT 10MM FLAT FULL NO TROCAR

## (undated) DEVICE — DRAPE MAYO STAND 30"

## (undated) DEVICE — SOL IRR POUR NS 0.9% 500ML

## (undated) DEVICE — DRILL BIT SYNTHES ORTHO QC 2.5X110MM

## (undated) DEVICE — SOL IRR BAG NS 0.9% 3000ML

## (undated) DEVICE — DRILL BIT SYNTHES ORTHO CALIBRATED 2.8MM

## (undated) DEVICE — DRSG VAC GRANUFOAM LARGE (BLACK)

## (undated) DEVICE — BLADE SCALPEL SAFETYLOCK #15

## (undated) DEVICE — FOLEY TRAY 16FR 5CC LTX UMETER CLOSED

## (undated) DEVICE — DRILL BIT SYNTHES ORTHO 4.3MM

## (undated) DEVICE — DRSG XEROFORM 1 X 8"

## (undated) DEVICE — DRSG STERISTRIPS 0.5 X 4"

## (undated) DEVICE — SUCTION YANKAUER NO CONTROL VENT

## (undated) DEVICE — DRAPE TOWEL BLUE 17" X 24"

## (undated) DEVICE — SUT POLYSORB 2-0 30" GS-21 UNDYED

## (undated) DEVICE — BRACE KNEE IMMOBILIZER SPLINT SUPER LARGE 20"

## (undated) DEVICE — DRAPE EXTREMITY BILATERAL LIMB 107.5" X 138" X 86"

## (undated) DEVICE — TOURNIQUET CUFF 18" DUAL PORT DUAL BLADDER W PLC (BLACK)

## (undated) DEVICE — POSITIONER FOAM HEAD CRADLE (PINK)

## (undated) DEVICE — TOURNIQUET CUFF 24" DUAL PORT SINGLE BLADDER LUER LOCK  (BLACK)

## (undated) DEVICE — VENODYNE/SCD SLEEVE CALF MEDIUM

## (undated) DEVICE — SUT POLYSORB 0 30" GS-21 UNDYED

## (undated) DEVICE — Device

## (undated) DEVICE — MEDICATION LABELS W MARKER

## (undated) DEVICE — PACK LOWER EXTREMITY

## (undated) DEVICE — DRSG WEBRIL 4"

## (undated) DEVICE — TOURNIQUET ESMARK 4"

## (undated) DEVICE — PREP SCRUB SKIN EXIDINE4% 30OZ

## (undated) DEVICE — DRSG ACE BANDAGE 4" NS

## (undated) DEVICE — BLADE SCALPEL SAFETYLOCK #11

## (undated) DEVICE — BLADE SCALPEL SAFETYLOCK #10

## (undated) DEVICE — DRAIN JACKSON PRATT 10FR ROUND END NO TROCAR

## (undated) DEVICE — DRAPE IOBAN 23" X 23"

## (undated) DEVICE — DRAPE 3/4 SHEET 52X76"

## (undated) DEVICE — PACK EXTREMITY

## (undated) DEVICE — SOLIDIFIER CANN EXPRESS 3K

## (undated) DEVICE — WARMING BLANKET UPPER ADULT

## (undated) DEVICE — SLING ARM CHIEFTAIN MESH LARGE

## (undated) DEVICE — DRAIN JACKSON PRATT 7MM FLAT FULL W 15 FR TROCAR

## (undated) DEVICE — GOWN TRIMAX LG

## (undated) DEVICE — DRAPE C ARM UNIVERSAL

## (undated) DEVICE — GLV 8 PROTEXIS (WHITE)

## (undated) DEVICE — TUBING TUR 2 PRONG

## (undated) DEVICE — DRSG WEBRIL 6"

## (undated) DEVICE — DRAIN RESERVOIR FOR JACKSON PRATT 100CC CARDINAL

## (undated) DEVICE — SUT MONOSOF 5-0 18" P-13

## (undated) DEVICE — PACK UPPER EXTREMITY

## (undated) DEVICE — PREP CHLORAPREP HI-LITE ORANGE 26ML

## (undated) DEVICE — DRSG STOCKINETTE IMPERVIOUS XL

## (undated) DEVICE — DRSG AQUACEL 3.5 X 10"

## (undated) DEVICE — CANISTER KCI 500ML GEL SENSA TRAC

## (undated) DEVICE — MARKING PEN W RULER

## (undated) DEVICE — DRILL BIT SYNTHES ORTHO QC 3.5X195MM

## (undated) DEVICE — LAP PAD 18 X 18"

## (undated) DEVICE — SPECIMEN CONTAINER 100ML

## (undated) DEVICE — SUT PDS II 0 36" CT-1

## (undated) DEVICE — DRSG XEROFORM 5 X 9"

## (undated) DEVICE — TOURNIQUET CUFF 30" DUAL PORT W PLC

## (undated) DEVICE — DRILL BIT SYNTHES ORTHO CALIBRATED 3.2MM

## (undated) DEVICE — SOL IRR POUR H2O 1500ML

## (undated) DEVICE — CUFF TOURNIQUET 24" SINGLE PORT W PLC

## (undated) DEVICE — DRAPE SPLIT SHEET 77" X 108"

## (undated) DEVICE — GLV 8.5 PROTEXIS (WHITE)

## (undated) DEVICE — SYNTHES LARGE COMBINATION CLAMP DISP

## (undated) DEVICE — DRSG ACE BANDAGE 2"

## (undated) DEVICE — TOURNIQUET CUFF 30" SINGLE PORT W PLC

## (undated) DEVICE — SOL IRR POUR H2O 250ML

## (undated) DEVICE — GLV 7 PROTEXIS (WHITE)

## (undated) DEVICE — SUT MONOSOF 3-0 30" C-16

## (undated) DEVICE — PREP CHLOROHEXIDINE 4% 118CC KIT

## (undated) DEVICE — SPLINT IMMOBILIZER 3-PANEL KNEE 20"

## (undated) DEVICE — GLV 6.5 PROTEXIS (WHITE)

## (undated) DEVICE — VISITEC 4X4

## (undated) DEVICE — DRSG CURITY GAUZE SPONGE 4 X 4" 12-PLY

## (undated) DEVICE — VENODYNE/SCD SLEEVE CALF LARGE

## (undated) DEVICE — DRSG STOCKINETTE IMPERVIOUS LG

## (undated) DEVICE — POSITIONER FOAM EGG CRATE ULNAR 2PCS (PINK)

## (undated) DEVICE — CANISTER SUCTION LID GUARD 3000CC

## (undated) DEVICE — STRYKER INTERPULSE HANDPIECE W IRR SUCTION TUBE

## (undated) DEVICE — STAPLER SKIN VISI-STAT 35 WIDE